# Patient Record
Sex: FEMALE | Race: WHITE | NOT HISPANIC OR LATINO | Employment: OTHER | URBAN - METROPOLITAN AREA
[De-identification: names, ages, dates, MRNs, and addresses within clinical notes are randomized per-mention and may not be internally consistent; named-entity substitution may affect disease eponyms.]

---

## 2017-01-05 ENCOUNTER — TELEPHONE (OUTPATIENT)
Dept: FAMILY MEDICINE | Facility: CLINIC | Age: 82
End: 2017-01-05

## 2017-01-05 DIAGNOSIS — Z12.31 OTHER SCREENING MAMMOGRAM: Primary | ICD-10-CM

## 2017-01-05 NOTE — TELEPHONE ENCOUNTER
----- Message from Rocio Quinones sent at 1/5/2017  2:39 PM CST -----  Contact: pt  Pt is requesting to have a mammogram done. Pt needs an order. Pls call pt back at [phone].

## 2017-01-08 RX ORDER — BENAZEPRIL HYDROCHLORIDE 5 MG/1
TABLET ORAL
Qty: 30 TABLET | Refills: 11 | Status: SHIPPED | OUTPATIENT
Start: 2017-01-08 | End: 2018-01-04

## 2017-01-08 RX ORDER — CLOPIDOGREL BISULFATE 75 MG/1
TABLET ORAL
Qty: 30 TABLET | Refills: 11 | Status: SHIPPED | OUTPATIENT
Start: 2017-01-08 | End: 2018-02-20 | Stop reason: SDUPTHER

## 2017-01-10 ENCOUNTER — HOSPITAL ENCOUNTER (OUTPATIENT)
Dept: RADIOLOGY | Facility: HOSPITAL | Age: 82
Discharge: HOME | End: 2017-01-10
Attending: FAMILY MEDICINE
Payer: MEDICARE

## 2017-01-10 DIAGNOSIS — Z12.31 OTHER SCREENING MAMMOGRAM: ICD-10-CM

## 2017-01-10 PROCEDURE — 77067 SCR MAMMO BI INCL CAD: CPT | Mod: 26,,, | Performed by: RADIOLOGY

## 2017-01-10 PROCEDURE — 77067 SCR MAMMO BI INCL CAD: CPT | Mod: TC

## 2017-01-10 PROCEDURE — 77063 BREAST TOMOSYNTHESIS BI: CPT | Mod: 26,,, | Performed by: RADIOLOGY

## 2017-02-08 ENCOUNTER — OFFICE VISIT (OUTPATIENT)
Dept: FAMILY MEDICINE | Facility: CLINIC | Age: 82
End: 2017-02-08
Payer: MEDICARE

## 2017-02-08 VITALS
WEIGHT: 127.56 LBS | SYSTOLIC BLOOD PRESSURE: 132 MMHG | HEART RATE: 75 BPM | BODY MASS INDEX: 21.78 KG/M2 | DIASTOLIC BLOOD PRESSURE: 60 MMHG | HEIGHT: 64 IN

## 2017-02-08 DIAGNOSIS — I25.10 CORONARY ARTERY DISEASE, ANGINA PRESENCE UNSPECIFIED, UNSPECIFIED VESSEL OR LESION TYPE, UNSPECIFIED WHETHER NATIVE OR TRANSPLANTED HEART: ICD-10-CM

## 2017-02-08 DIAGNOSIS — J30.9 ALLERGIC RHINITIS, UNSPECIFIED ALLERGIC RHINITIS TRIGGER, UNSPECIFIED RHINITIS SEASONALITY: ICD-10-CM

## 2017-02-08 DIAGNOSIS — S00.83XD FACIAL CONTUSION, SUBSEQUENT ENCOUNTER: Primary | ICD-10-CM

## 2017-02-08 DIAGNOSIS — J06.9 UPPER RESPIRATORY TRACT INFECTION, UNSPECIFIED TYPE: ICD-10-CM

## 2017-02-08 DIAGNOSIS — I10 ESSENTIAL HYPERTENSION: ICD-10-CM

## 2017-02-08 PROCEDURE — 99999 PR PBB SHADOW E&M-EST. PATIENT-LVL II: CPT | Mod: PBBFAC,,, | Performed by: FAMILY MEDICINE

## 2017-02-08 PROCEDURE — 1160F RVW MEDS BY RX/DR IN RCRD: CPT | Mod: S$GLB,,, | Performed by: FAMILY MEDICINE

## 2017-02-08 PROCEDURE — 99499 UNLISTED E&M SERVICE: CPT | Mod: S$PBB,,, | Performed by: FAMILY MEDICINE

## 2017-02-08 PROCEDURE — 99214 OFFICE O/P EST MOD 30 MIN: CPT | Mod: S$GLB,,, | Performed by: FAMILY MEDICINE

## 2017-02-08 PROCEDURE — 1157F ADVNC CARE PLAN IN RCRD: CPT | Mod: S$GLB,,, | Performed by: FAMILY MEDICINE

## 2017-02-08 PROCEDURE — 1159F MED LIST DOCD IN RCRD: CPT | Mod: S$GLB,,, | Performed by: FAMILY MEDICINE

## 2017-02-08 RX ORDER — MONTELUKAST SODIUM 10 MG/1
10 TABLET ORAL NIGHTLY
Qty: 30 TABLET | Refills: 11 | Status: SHIPPED | OUTPATIENT
Start: 2017-02-08 | End: 2017-03-10

## 2017-02-08 NOTE — PROGRESS NOTES
Tamie Fink presents with trip and fall 4 d ago to forehead,no loc,  ER eval CT orbits and frontal neg acute frx. Bruise extended but otherwise feels well. No loc. Has moderate upper respiratory congestion,rhinnorhea no cough past several months . OTC help slightly. Denies nausea,vomiting,diarrhea or significant fever.    Past Medical History   Diagnosis Date    Allergy     Arthritis     Asthma     GERD (gastroesophageal reflux disease)     HEARING LOSS     Hypertension     Lung disease     Pneumonia      Past Surgical History   Procedure Laterality Date    Carotid stent       Review of patient's allergies indicates:   Allergen Reactions    No known drug allergies      Current Outpatient Prescriptions on File Prior to Visit   Medication Sig Dispense Refill    atorvastatin (LIPITOR) 20 MG tablet Take 1 tablet (20 mg total) by mouth once daily. 30 tablet 11    benazepril (LOTENSIN) 5 MG tablet TAKE ONE TABLET BY MOUTH ONCE DAILY 30 tablet 11    BESIVANCE 0.6 % DrpS       clopidogrel (PLAVIX) 75 mg tablet TAKE ONE TABLET BY MOUTH ONCE DAILY 30 tablet 11    fluticasone (FLONASE) 50 mcg/actuation nasal spray Use 2 sprays to each nostril daily 16 g 12    fluticasone-salmeterol 250-50 mcg/dose (ADVAIR) 250-50 mcg/dose diskus inhaler Inhale 1 puff into the lungs 2 (two) times daily. 60 each 5    gabapentin (NEURONTIN) 300 MG capsule Take 3 capsules (900 mg total) by mouth every evening. 90 capsule 11    ipratropium (ATROVENT HFA) 17 mcg/actuation inhaler Inhale 2 puffs into the lungs every 6 (six) hours as needed for Wheezing. 12.9 g 3    metoprolol succinate (TOPROL-XL) 25 MG 24 hr tablet TAKE 1/2 TABLET BY MOUTH EVERY NIGHT AT BEDTIME 30 tablet 11    omeprazole (PRILOSEC) 20 MG capsule       tramadol (ULTRAM) 50 mg tablet TAKE 1 TABLET BY MOUTH EVERY 6 HOURS AS NEEDED 60 tablet 3    albuterol 90 mcg/actuation HFAA Inhale 2 puffs into the lungs every 6 (six) hours as needed. 18 g 11     [DISCONTINUED] meclizine (ANTIVERT) 25 mg tablet Take 0.5-1 tablets (12.5-25 mg total) by mouth 3 (three) times daily as needed. 45 tablet 0     Current Facility-Administered Medications on File Prior to Visit   Medication Dose Route Frequency Provider Last Rate Last Dose    hydrOXYzine injection 50 mg  50 mg Intramuscular 1 time in Clinic/HOD Amador Carlos MD         Social History     Social History    Marital status:      Spouse name: N/A    Number of children: N/A    Years of education: N/A     Occupational History    Not on file.     Social History Main Topics    Smoking status: Former Smoker     Packs/day: 1.00     Years: 40.00     Quit date: 5/3/1984    Smokeless tobacco: Never Used    Alcohol use No    Drug use: No    Sexual activity: Not on file     Other Topics Concern    Not on file     Social History Narrative     Family History   Problem Relation Age of Onset    Cancer Mother     Heart disease Father          ROS:  SKIN: No rashes, itching or changes in color or texture of skin.  EYES: Visual acuity fine. No photophobia, ocular pain or diplopia.EARS: Denies ear pain, discharge or vertigo.NOSE: No loss of smell, no epistaxis some postnasal drip.MOUTH & THROAT: No hoarseness or change in voice. No excessive gum bleeding.CHEST: Denies GERARDO, cyanosis, wheezing  CARDIOVASCULAR: Denies chest pain, PND, orthopnea or reduced exercise tolerance.  ABDOMEN:  No weight loss.No abdominal pain, no hematemesis or blood in stool.  URINARY: No flank pain, dysuria or hematuria.  PERIPHERAL VASCULAR: No claudication or cyanosis.  MUSCULOSKELETAL: Negative   NEUROLOGIC: No history of seizures, paralysis, alteration of gait or coordination.    PE: Vital signs as noted  Heent:Mod gen ecchymosis resolving hematoma face and scalp subcong hem as well  PERRLA,EOMI, ,fundi reveal no hemmorhage exudate or papilledema.Otic canals clear, tympanic membranes slightly dull bilaterally.Nasal mucosa slightly red and  edematous.Posterior pharynx slightly red but without exudate.  Neck:Supple with minimal anterior cervical adenopathy.  Chest:Clear bilateral breath sounds with mild scattered ronchi  Heart:Regular rhthym without murmer  Abdomen:Soft, non tender,no masses, no hepatosplenomegalyExtremeties and Neurologic:Grossly within normal limits  Impression: Facial contusions  CAD  COPD  AR  Plan: Obs   Resume plavix  Rev pulm toilet and Card fu

## 2017-02-27 ENCOUNTER — TELEPHONE (OUTPATIENT)
Dept: FAMILY MEDICINE | Facility: CLINIC | Age: 82
End: 2017-02-27

## 2017-02-27 NOTE — TELEPHONE ENCOUNTER
Pt states she is still having a HA above her eyes where she hit her head during her fall. She is going to come back in to see you but wanted to make sure theres no testing you wanted done.

## 2017-02-27 NOTE — TELEPHONE ENCOUNTER
----- Message from Monie Cary sent at 2/27/2017  1:44 PM CST -----  Contact: pt  Pt requests Faviola to call her regarding a follow up due to her fall. Pt can be reached at [phone] or [phone].

## 2017-02-28 NOTE — TELEPHONE ENCOUNTER
Pt notified states she is not having any tenderness and will continue to monitor and report any problems

## 2017-03-03 RX ORDER — GABAPENTIN 300 MG/1
CAPSULE ORAL
Qty: 90 CAPSULE | Refills: 11 | Status: SHIPPED | OUTPATIENT
Start: 2017-03-03 | End: 2018-03-08 | Stop reason: SDUPTHER

## 2017-03-07 ENCOUNTER — TELEPHONE (OUTPATIENT)
Dept: FAMILY MEDICINE | Facility: CLINIC | Age: 82
End: 2017-03-07

## 2017-03-07 NOTE — TELEPHONE ENCOUNTER
----- Message from Heide Almanzarite sent at 3/7/2017  1:33 PM CST -----  Contact: Pt   Pt called and stated she needed to speak to the nurse. She stated she needs clearance to see the dentist. She can be reached at [phone] or [phone].      Thanks,  Tf

## 2017-03-07 NOTE — TELEPHONE ENCOUNTER
Pt is needing to to have a tooth pulled and needs a letter stating it is ok to hold plavix and also needs to know how long to hold it for.     [facility] [phone] (phone)

## 2017-03-15 ENCOUNTER — TELEPHONE (OUTPATIENT)
Dept: FAMILY MEDICINE | Facility: CLINIC | Age: 82
End: 2017-03-15

## 2017-03-15 NOTE — TELEPHONE ENCOUNTER
----- Message from Zachary Lynn sent at 3/14/2017  4:10 PM CDT -----  Contact: pt   States she is calling to follow up on the letter being sent to her dentist office and wants to discuss and can be reached at [phone]//thanks/dbw

## 2017-04-06 ENCOUNTER — LAB VISIT (OUTPATIENT)
Dept: LAB | Facility: HOSPITAL | Age: 82
End: 2017-04-06
Attending: PSYCHIATRY & NEUROLOGY
Payer: MEDICARE

## 2017-04-06 DIAGNOSIS — G60.9 PERIPHERAL NEUROPATHY, IDIOPATHIC: ICD-10-CM

## 2017-04-06 DIAGNOSIS — R20.9 DISTURBANCE OF SKIN SENSATION: Primary | ICD-10-CM

## 2017-04-06 LAB
FOLATE SERPL-MCNC: 10.7 NG/ML
VIT B12 SERPL-MCNC: 667 PG/ML

## 2017-04-06 PROCEDURE — 82746 ASSAY OF FOLIC ACID SERUM: CPT

## 2017-04-06 PROCEDURE — 36415 COLL VENOUS BLD VENIPUNCTURE: CPT | Mod: PO

## 2017-04-06 PROCEDURE — 82607 VITAMIN B-12: CPT

## 2017-04-06 PROCEDURE — 84425 ASSAY OF VITAMIN B-1: CPT

## 2017-04-11 LAB — VIT B1 SERPL-MCNC: 52 UG/L (ref 38–122)

## 2017-06-08 ENCOUNTER — PATIENT OUTREACH (OUTPATIENT)
Dept: ADMINISTRATIVE | Facility: HOSPITAL | Age: 82
End: 2017-06-08

## 2017-06-08 NOTE — LETTER
June 8, 2017    Tamie Fink  [address] [place] [address]           [facility]  [address]  [facility] [address]  Phone: [phone] Dear Mrs. Fink:    [facility] is committed to your overall health.  To help you get the most out of each of your visits, we will review your information to make sure you are up to date on all of your recommended tests and/or procedures.      Amador Carlos MD has found that you may be due for   Health Maintenance Due   Topic    TETANUS VACCINE     DEXA SCAN     Zoster Vaccine     Pneumococcal (65+) (1 of 2 - PCV13)    Lipid Panel       If you have had any of the above done at another facility, please bring the records or information with you so that your record at [facility] will be complete.    If you are currently taking medication, please bring it with you to your appointment for review.    We will be happy to assist you with scheduling any necessary appointments or you may contact the [facility] appointment desk at [phone] to schedule at your convenience.     Thank you for choosing [facility] for your healthcare needs,      If you have any questions or concerns, please don't hesitate to call.    Sincerely,    ANITA Izaguirre  Care Coordination Department  [facility]  [phone]

## 2017-06-12 ENCOUNTER — TELEPHONE (OUTPATIENT)
Dept: FAMILY MEDICINE | Facility: CLINIC | Age: 82
End: 2017-06-12

## 2017-06-12 NOTE — TELEPHONE ENCOUNTER
----- Message from Elena Connors sent at 6/12/2017 10:01 AM CDT -----  Contact: pt  States she is scheduled on 6/22 for a pre op clearance and she needs an appt this week and she wants to see Dr Carlos. Please call pt at [phone]. Thank you

## 2017-06-13 ENCOUNTER — LAB VISIT (OUTPATIENT)
Dept: LAB | Facility: HOSPITAL | Age: 82
End: 2017-06-13
Attending: FAMILY MEDICINE
Payer: MEDICARE

## 2017-06-13 ENCOUNTER — OFFICE VISIT (OUTPATIENT)
Dept: FAMILY MEDICINE | Facility: CLINIC | Age: 82
End: 2017-06-13
Payer: MEDICARE

## 2017-06-13 VITALS
SYSTOLIC BLOOD PRESSURE: 116 MMHG | TEMPERATURE: 98 F | DIASTOLIC BLOOD PRESSURE: 54 MMHG | WEIGHT: 125.69 LBS | HEART RATE: 62 BPM | HEIGHT: 64 IN | BODY MASS INDEX: 21.46 KG/M2

## 2017-06-13 DIAGNOSIS — R42 VERTIGO: ICD-10-CM

## 2017-06-13 DIAGNOSIS — I25.10 CORONARY ARTERY DISEASE, ANGINA PRESENCE UNSPECIFIED, UNSPECIFIED VESSEL OR LESION TYPE, UNSPECIFIED WHETHER NATIVE OR TRANSPLANTED HEART: ICD-10-CM

## 2017-06-13 DIAGNOSIS — R42 VERTIGO: Primary | ICD-10-CM

## 2017-06-13 DIAGNOSIS — J06.9 UPPER RESPIRATORY TRACT INFECTION, UNSPECIFIED TYPE: ICD-10-CM

## 2017-06-13 DIAGNOSIS — I10 ESSENTIAL HYPERTENSION: ICD-10-CM

## 2017-06-13 LAB
ALBUMIN SERPL BCP-MCNC: 3.4 G/DL
ALP SERPL-CCNC: 83 U/L
ALT SERPL W/O P-5'-P-CCNC: 7 U/L
ANION GAP SERPL CALC-SCNC: 7 MMOL/L
AST SERPL-CCNC: 15 U/L
BASOPHILS # BLD AUTO: 0.04 K/UL
BASOPHILS NFR BLD: 0.5 %
BILIRUB SERPL-MCNC: 0.2 MG/DL
BUN SERPL-MCNC: 20 MG/DL
CALCIUM SERPL-MCNC: 9.4 MG/DL
CHLORIDE SERPL-SCNC: 102 MMOL/L
CO2 SERPL-SCNC: 31 MMOL/L
CREAT SERPL-MCNC: 0.9 MG/DL
DIFFERENTIAL METHOD: ABNORMAL
EOSINOPHIL # BLD AUTO: 0.1 K/UL
EOSINOPHIL NFR BLD: 1.6 %
ERYTHROCYTE [DISTWIDTH] IN BLOOD BY AUTOMATED COUNT: 14.2 %
EST. GFR  (AFRICAN AMERICAN): >60 ML/MIN/1.73 M^2
EST. GFR  (NON AFRICAN AMERICAN): 58.1 ML/MIN/1.73 M^2
GLUCOSE SERPL-MCNC: 90 MG/DL
HCT VFR BLD AUTO: 38.1 %
HGB BLD-MCNC: 11.6 G/DL
LYMPHOCYTES # BLD AUTO: 2.1 K/UL
LYMPHOCYTES NFR BLD: 25.4 %
MCH RBC QN AUTO: 29.6 PG
MCHC RBC AUTO-ENTMCNC: 30.4 %
MCV RBC AUTO: 97 FL
MONOCYTES # BLD AUTO: 0.6 K/UL
MONOCYTES NFR BLD: 7.5 %
NEUTROPHILS # BLD AUTO: 5.4 K/UL
NEUTROPHILS NFR BLD: 64.8 %
PLATELET # BLD AUTO: 239 K/UL
PMV BLD AUTO: 10.3 FL
POTASSIUM SERPL-SCNC: 4.6 MMOL/L
PROT SERPL-MCNC: 7.6 G/DL
RBC # BLD AUTO: 3.92 M/UL
SODIUM SERPL-SCNC: 140 MMOL/L
TSH SERPL DL<=0.005 MIU/L-ACNC: 1.24 UIU/ML
WBC # BLD AUTO: 8.3 K/UL

## 2017-06-13 PROCEDURE — 80053 COMPREHEN METABOLIC PANEL: CPT

## 2017-06-13 PROCEDURE — 84443 ASSAY THYROID STIM HORMONE: CPT

## 2017-06-13 PROCEDURE — 99999 PR PBB SHADOW E&M-EST. PATIENT-LVL II: CPT | Mod: PBBFAC,,, | Performed by: FAMILY MEDICINE

## 2017-06-13 PROCEDURE — 93010 ELECTROCARDIOGRAM REPORT: CPT | Mod: S$GLB,,, | Performed by: INTERNAL MEDICINE

## 2017-06-13 PROCEDURE — 85025 COMPLETE CBC W/AUTO DIFF WBC: CPT

## 2017-06-13 PROCEDURE — 93005 ELECTROCARDIOGRAM TRACING: CPT | Mod: S$GLB,,, | Performed by: FAMILY MEDICINE

## 2017-06-13 PROCEDURE — 1159F MED LIST DOCD IN RCRD: CPT | Mod: S$GLB,,, | Performed by: FAMILY MEDICINE

## 2017-06-13 PROCEDURE — 99214 OFFICE O/P EST MOD 30 MIN: CPT | Mod: S$GLB,,, | Performed by: FAMILY MEDICINE

## 2017-06-13 PROCEDURE — 36415 COLL VENOUS BLD VENIPUNCTURE: CPT | Mod: PO

## 2017-06-13 RX ORDER — MECLIZINE HCL 12.5 MG 12.5 MG/1
25 TABLET ORAL 3 TIMES DAILY PRN
Qty: 30 TABLET | Refills: 0 | Status: SHIPPED | OUTPATIENT
Start: 2017-06-13 | End: 2018-06-26

## 2017-06-13 NOTE — PROGRESS NOTES
[place] ADRIANNA Fink presents with sudden onset vertigo this am after 30 minutes dissipated       Past Medical History:   Diagnosis Date    Allergy     Arthritis     Asthma     GERD (gastroesophageal reflux disease)     HEARING LOSS     Hypertension     Lung disease     Pneumonia      Past Surgical History:   Procedure Laterality Date    CAROTID STENT       Allergies   Allergen Reactions    No Known Drug Allergies      Current Outpatient Prescriptions on File Prior to Visit   Medication Sig Dispense Refill    atorvastatin (LIPITOR) 20 MG tablet Take 1 tablet (20 mg total) by mouth once daily. 30 tablet 11    benazepril (LOTENSIN) 5 MG tablet TAKE ONE TABLET BY MOUTH ONCE DAILY 30 tablet 11    clopidogrel (PLAVIX) 75 mg tablet TAKE ONE TABLET BY MOUTH ONCE DAILY 30 tablet 11    fluticasone (FLONASE) 50 mcg/actuation nasal spray Use 2 sprays to each nostril daily 16 g 12    fluticasone-salmeterol 250-50 mcg/dose (ADVAIR) 250-50 mcg/dose diskus inhaler Inhale 1 puff into the lungs 2 (two) times daily. 60 each 5    gabapentin (NEURONTIN) 300 MG capsule TAKE THREE CAPSULES BY MOUTH IN THE EVENING 90 capsule 11    ipratropium (ATROVENT HFA) 17 mcg/actuation inhaler Inhale 2 puffs into the lungs every 6 (six) hours as needed for Wheezing. 12.9 g 3    omeprazole (PRILOSEC) 20 MG capsule       tramadol (ULTRAM) 50 mg tablet TAKE 1 TABLET BY MOUTH EVERY 6 HOURS AS NEEDED 60 tablet 3    albuterol 90 mcg/actuation HFAA Inhale 2 puffs into the lungs every 6 (six) hours as needed. 18 g 11    metoprolol succinate (TOPROL-XL) 25 MG 24 hr tablet TAKE 1/2 TABLET BY MOUTH EVERY NIGHT AT BEDTIME 30 tablet 11    [DISCONTINUED] BESIVANCE 0.6 % Ari        Current Facility-Administered Medications on File Prior to Visit   Medication Dose Route Frequency Provider Last Rate Last Dose    hydrOXYzine injection 50 mg  50 mg Intramuscular 1 time in Clinic/HOD Amador Carlos MD         Social History     Social History     Marital status:      Spouse name: N/A    Number of children: N/A    Years of education: N/A     Occupational History    Not on file.     Social History Main Topics    Smoking status: Former Smoker     Packs/day: 1.00     Years: 40.00     Quit date: 5/3/1984    Smokeless tobacco: Never Used    Alcohol use No    Drug use: No    Sexual activity: Not on file     Other Topics Concern    Not on file     Social History Narrative    No narrative on file     Family History   Problem Relation Age of Onset    Cancer Mother     Heart disease Father          ROS:  SKIN: No rashes, itching or changes in color or texture of skin.  EYES: Visual acuity fine. No photophobia, ocular pain or diplopia.EARS: Denies ear pain, discharge or vertigo.NOSE: No loss of smell, no epistaxis some postnasal drip.MOUTH & THROAT: No hoarseness or change in voice. No excessive gum bleeding.CHEST: Denies GERARDO, cyanosis, wheezing  CARDIOVASCULAR: Denies chest pain, PND, orthopnea or reduced exercise tolerance.  ABDOMEN:  No weight loss.No abdominal pain, no hematemesis or blood in stool.  URINARY: No flank pain, dysuria or hematuria.  PERIPHERAL VASCULAR: No claudication or cyanosis.  MUSCULOSKELETAL: Negative   NEUROLOGIC: No history of seizures, paralysis, alteration of gait or coordination.    PE: Vital signs as noted  Heent:Normocephalic with no recent cranial trauma,PERRLA,EOMI,conjunctiva clear,fundi reveal no hemmorhage exudate or papilledema.Otic canals clear, tympanic membranes slightly dull bilaterally.Nasal mucosa slightly red and edematous.Posterior pharynx slightly red but without exudate.  Neck:Supple with minimal anterior cervical adenopathy.  Chest:Clear bilateral breath sounds with mild scattered ronchi  Heart:Regular rhthym without murmer  Abdomen:Soft, non tender,no masses, no hepatosplenomegalyExtremeties and Neurologic:Grossly within normal limits  Impression: Vertigo  Hx CAD   HBP  Plan: Reduce Na    EKG   Lab  [place]   Pulse ox 95% RA   Medically cleared for Lt CTS surgery

## 2017-06-19 ENCOUNTER — TELEPHONE (OUTPATIENT)
Dept: FAMILY MEDICINE | Facility: CLINIC | Age: 82
End: 2017-06-19

## 2017-06-19 DIAGNOSIS — R42 VERTIGO: Primary | ICD-10-CM

## 2017-06-19 NOTE — TELEPHONE ENCOUNTER
----- Message from Oriana Beltran sent at 6/19/2017  9:30 AM CDT -----  Contact: Ousmane pascal son  Ousmane needs to speak to the nurse regarding a referral for an ENT/please call [phone] or [phone]/ma

## 2017-06-19 NOTE — TELEPHONE ENCOUNTER
Patient states that her dizziness has gotten bad and the meclizine made her sick. Updated med card. She would like a referral to ENT for vertigo. Is there someone you would recommend for her?

## 2017-06-20 ENCOUNTER — OFFICE VISIT (OUTPATIENT)
Dept: OTOLARYNGOLOGY | Facility: CLINIC | Age: 82
End: 2017-06-20
Payer: MEDICARE

## 2017-06-20 ENCOUNTER — CLINICAL SUPPORT (OUTPATIENT)
Dept: AUDIOLOGY | Facility: CLINIC | Age: 82
End: 2017-06-20
Payer: MEDICARE

## 2017-06-20 VITALS
SYSTOLIC BLOOD PRESSURE: 152 MMHG | WEIGHT: 125.88 LBS | HEART RATE: 70 BPM | DIASTOLIC BLOOD PRESSURE: 78 MMHG | BODY MASS INDEX: 21.61 KG/M2

## 2017-06-20 DIAGNOSIS — H90.3 SENSORINEURAL HEARING LOSS (SNHL) OF BOTH EARS: ICD-10-CM

## 2017-06-20 DIAGNOSIS — H90.3 SENSORINEURAL HEARING LOSS, BILATERAL: Primary | ICD-10-CM

## 2017-06-20 DIAGNOSIS — H69.93 ETD (EUSTACHIAN TUBE DYSFUNCTION), BILATERAL: ICD-10-CM

## 2017-06-20 DIAGNOSIS — R42 DIZZINESS: Primary | ICD-10-CM

## 2017-06-20 DIAGNOSIS — H81.90 VESTIBULOPATHY, UNSPECIFIED LATERALITY: ICD-10-CM

## 2017-06-20 PROCEDURE — 1126F AMNT PAIN NOTED NONE PRSNT: CPT | Mod: S$GLB,,, | Performed by: PHYSICIAN ASSISTANT

## 2017-06-20 PROCEDURE — 92540 BASIC VESTIBULAR EVALUATION: CPT | Mod: S$GLB,,, | Performed by: AUDIOLOGIST-HEARING AID FITTER

## 2017-06-20 PROCEDURE — 92557 COMPREHENSIVE HEARING TEST: CPT | Mod: S$GLB,,, | Performed by: AUDIOLOGIST-HEARING AID FITTER

## 2017-06-20 PROCEDURE — 99204 OFFICE O/P NEW MOD 45 MIN: CPT | Mod: S$GLB,,, | Performed by: PHYSICIAN ASSISTANT

## 2017-06-20 PROCEDURE — 92567 TYMPANOMETRY: CPT | Mod: S$GLB,,, | Performed by: AUDIOLOGIST-HEARING AID FITTER

## 2017-06-20 PROCEDURE — 99999 PR PBB SHADOW E&M-EST. PATIENT-LVL I: CPT | Mod: PBBFAC,,, | Performed by: AUDIOLOGIST-HEARING AID FITTER

## 2017-06-20 PROCEDURE — 1159F MED LIST DOCD IN RCRD: CPT | Mod: S$GLB,,, | Performed by: PHYSICIAN ASSISTANT

## 2017-06-20 PROCEDURE — 99999 PR PBB SHADOW E&M-EST. PATIENT-LVL III: CPT | Mod: PBBFAC,,, | Performed by: PHYSICIAN ASSISTANT

## 2017-06-20 NOTE — PROGRESS NOTES
Subjective:       Patient ID: Tamie Fink is a 86 y.o. female.    Chief Complaint: Dizziness (review audio today )    Patient is a very pleasant 86 year old female here to see me today for the first time in consultation at the request of Dr. Carlos for evaluation of dizziness and imbalance.  She had sudden onset vertigo about one week ago while in bed.  Ever since that time, she feels dizzy; like her head is swimming and she has imbalance while walking.  She has know hearing loss and wears hearing aids in each ear from [facility].  She reports equal sided hearing loss that is unchanged with the dizziness.  She denies tinnitus.  Denies ear pain or drainage.  She has tried Antivert for her dizziness but stopped it due to GI upset.  She has allergy problems and is currently congested and she always has rhinorrhea.  She uses Flonase as needed (not consistently).  She did trip over a dog bed and fall in February of this year and struck her head.      Review of Systems   Constitutional: Negative for activity change, appetite change and fever.   HENT: Positive for congestion, hearing loss and rhinorrhea. Negative for ear discharge, ear pain, nosebleeds, postnasal drip, sinus pressure, sneezing, sore throat, tinnitus and trouble swallowing.    Eyes: Negative for discharge.   Respiratory: Positive for cough. Negative for shortness of breath and wheezing.    Cardiovascular: Negative for chest pain.   Gastrointestinal: Negative for diarrhea, nausea and vomiting.   Musculoskeletal: Positive for arthralgias. Negative for neck pain.   Allergic/Immunologic: Positive for environmental allergies. Negative for food allergies.   Neurological: Positive for dizziness and headaches. Negative for light-headedness.   Hematological: Negative for adenopathy.   Psychiatric/Behavioral: Negative for sleep disturbance.       Objective:      Physical Exam   Constitutional: She is oriented to person, place, and time. She appears well-developed and  well-nourished. She is cooperative. No distress.   HENT:   Head: Normocephalic and atraumatic.   Right Ear: External ear and ear canal normal. Tympanic membrane is retracted.   Left Ear: External ear and ear canal normal. Tympanic membrane is retracted.   Nose: Nose normal. No mucosal edema, rhinorrhea, nasal deformity or septal deviation. No epistaxis. Right sinus exhibits no maxillary sinus tenderness and no frontal sinus tenderness. Left sinus exhibits no maxillary sinus tenderness and no frontal sinus tenderness.   Mouth/Throat: Uvula is midline, oropharynx is clear and moist and mucous membranes are normal. Mucous membranes are not pale and not dry. No trismus in the jaw. Normal dentition. No uvula swelling. No oropharyngeal exudate or posterior oropharyngeal erythema.   Eyes: Conjunctivae, EOM and lids are normal. Pupils are equal, round, and reactive to light. Right eye exhibits no chemosis. Left eye exhibits no chemosis. Right conjunctiva is not injected. Left conjunctiva is not injected. No scleral icterus. Right eye exhibits normal extraocular motion and no nystagmus. Left eye exhibits normal extraocular motion and no nystagmus.   Neck: Trachea normal and phonation normal. No tracheal tenderness present. No tracheal deviation present. No thyroid mass and no thyromegaly present.   Cardiovascular: Intact distal pulses.    Pulmonary/Chest: Effort normal. No stridor. No respiratory distress.   Abdominal: She exhibits no distension.   Lymphadenopathy:        Head (right side): No submental, no submandibular, no preauricular and no posterior auricular adenopathy present.        Head (left side): No submental, no submandibular, no preauricular and no posterior auricular adenopathy present.     She has no cervical adenopathy.   Neurological: She is alert and oriented to person, place, and time. No cranial nerve deficit.   Skin: Skin is warm and dry. No rash noted. No erythema.   Psychiatric: She has a normal mood  and affect. Her behavior is normal.       [place]-Hallpike:  Positive for dizziness but negative nystagmus on RIGHT      AUDIOGRAM:  Results reveal a mild-to-moderate sensorineural hearing loss 250-8000 Hz bilaterally.  Speech Reception Thresholds were  35 dBHL for the right ear and 40 dBHL for the left ear.   Word recognition scores were fair for the right ear and fair for the left ear.   Tympanograms were Type A for the right ear and Type A for the left ear.    Videonystagmography Report (VNG):  Oculomotor function tests (sinusoidal tracking, saccade, optokinetic) were normal and symmetric.  Spontaneous test was absent for nystagmus.  Gaze test was absent for nystagmus.  Head-shake test was absent for after head-shake nystagmus.  Static Positional test revealed right-beating nystagmus to head center and head left:                        Head Center: 4 d/s RB                        Head Right: Absent for nystagmus                        Head Left: 5 d/s RB  [place]-Hallpike Right revealed 7 d/s up-beating + possible 2 d/s right-beating nystagmus that suppressed with fixation; no torsional component observed.  [place]-Hallpike Right Repeat revealed 7 d/s up-beating + 4 d/s left-beating nystagmus; no torsional component; did not fatigue and response suppressed with fixation.  [place]-Hallpike Left revealed a few beats of 5 d/s right-beating nystagmus.     Summary: Abnormal non-localizing VNG screen.  Today's abnormal findings were direction-changing positional nystagmus that is not consistent with BPPV.       Assessment:       1. Dizziness    2. ETD (eustachian tube dysfunction), bilateral    3. Sensorineural hearing loss (SNHL) of both ears        Plan:         1.  Dizziness:  Reviewed results of today audiogram and VNG with patient and her son.  Recommend she return for calorics.  Will call her with results.  Offered to perform Epley today as she does have strong response with dizziness only with [place]-Hallpike on the right but she  requests to wait and do at time of calorics. She reports having a sister who  with brain tumor and she's worried her symptoms may be related.  She reports having a negative CT Head at [facility] after her fall in 2017.  2.  ETD:  Reviewed results of her tympanograms and discussed that she has negative pressure bilaterally.  Recommend Flonase daily.   We discussed in detail the proper mechanism of use directing the spray away from the nasal septum.  In addition, we also discussed that it will take two to three weeks of daily use to achieve maximal effectiveness.    3.. SNHL:  Continue with hearing aids.  Patient was given a copy of today's audiogram to share with her hearing aid dispenser.  Recommend hearing protection around loud noises and annual audiograms.    Thanks Dr. Carlos for the referral.  Report returned via EPIC.

## 2017-06-20 NOTE — PROGRESS NOTES
Referring provider: Morena Bradshaw PA-C    Tamie Fink was seen 06/20/2017 for an audiological and vestibular evaluation.  Patient complains of dizziness and imbalance.  She had sudden onset vertigo about one week ago.  Ever since she feels dizzy, head swimming and imbalance.  She has hearing loss and wears hearing aids in each ear from [employer].  She reports equal sided hearing loss that is unchanged with the dizziness.  She denies tinnitus.  She has allergy problems and is currently congested.     Audiology Report:  Results reveal a mild-to-moderate sensorineural hearing loss 250-8000 Hz bilaterally.  Speech Reception Thresholds were  35 dBHL for the right ear and 40 dBHL for the left ear.   Word recognition scores were fair for the right ear and fair for the left ear.   Tympanograms were Type A for the right ear and Type A for the left ear.      Videonystagmography Report (VNG):  Oculomotor function tests (sinusoidal tracking, saccade, optokinetic) were normal and symmetric.  Spontaneous test was absent for nystagmus.  Gaze test was absent for nystagmus.  Head-shake test was absent for after head-shake nystagmus.  Static Positional test revealed right-beating nystagmus to head center and head left:   Head Center: 4 d/s RB   Head Right: Absent for nystagmus   Head Left: 5 d/s RB  [place]-Hallpike Right revealed 7 d/s up-beating + possible 2 d/s right-beating nystagmus that suppressed with fixation; no torsional component observed.  [place]ber[place]-Hallpike Right Repeat revealed 7 d/s up-beating + 4 d/s left-beating nystagmus; no torsional component; did not fatigue and response suppressed with fixation.  [place]-Hallpike Left revealed a few beats of 5 d/s right-beating nystagmus.    Summary: Abnormal non-localizing VNG screen.  Today's abnormal findings were direction-changing positional nystagmus that is not consistent with BPPV.    Recommendations:  1. ENT  2. Calorics  3. Continue with hearing aids.  Patient was given a copy  of today's audiogram to share with her hearing aid dispenser.  4. Annual audiograms    Patient was counseled on the above findings.  Tracings are to be scanned.

## 2017-06-26 ENCOUNTER — CLINICAL SUPPORT (OUTPATIENT)
Dept: AUDIOLOGY | Facility: CLINIC | Age: 82
End: 2017-06-26
Payer: MEDICARE

## 2017-06-26 DIAGNOSIS — H81.8X9 OTHER DISORDERS OF VESTIBULAR FUNCTION, UNSPECIFIED EAR: ICD-10-CM

## 2017-06-26 DIAGNOSIS — H69.93 EUSTACHIAN TUBE DYSFUNCTION, BILATERAL: Primary | ICD-10-CM

## 2017-06-26 PROCEDURE — 92537 CALORIC VSTBLR TEST W/REC: CPT | Mod: S$GLB,,, | Performed by: AUDIOLOGIST-HEARING AID FITTER

## 2017-06-26 PROCEDURE — 92542 POSITIONAL NYSTAGMUS TEST: CPT | Mod: S$GLB,,, | Performed by: AUDIOLOGIST-HEARING AID FITTER

## 2017-06-26 NOTE — PROGRESS NOTES
Referring provider: Morena Bradshaw PA-C    Tamie Fink was seen 2017 for completion Videonystagmography (VNG) calorics testing.    VNG results from 2017 revealed non-localizing positional nystagmus.  Patient reports dizziness and imbalance is improving since her last visit.  She had sudden onset vertigo about 1.5 weeks ago while in bed. She was feeling constant dizziness; like her head is swimming and imbalance.  She has know hearing loss and wears hearing aids in each ear from [employer].     Pre-Screen:  [place]-Hallpike: Negative for BPPV to the right and to the left    VNG Re-check:  Spontaneous nystagmus was absent.  Static Positional nystagmus was absent.  Hallpike Head-hanging Right: Absent for nystagmus or dizziness  Hallpike Head-hanging Left: Absent for nystagmus or dizziness  Bi-thermal caloric irrigations revealed an 11% caloric weakness in the right ear, which is within normal limits, and 2% directional preponderance to the left, which is within normal limits.  Fixation suppression following caloric irrigations were normal.  RC: 11 d/s   RW: 10 d/s    d/s   LW: 13 d/s    Impressions: Normal VNG; no evidence inner ear dysfunction nor asymmetry including positional nystagmus or BPPV.     Recommendations:  1. ENT review.  Continue with Flonase per ENT - patient reports it is helping.   2. A trial with VOR exercises to encourage adaptation and reduce subjective symptoms    Tracings are scanned into computer.

## 2017-06-26 NOTE — Clinical Note
VNG review only - normal results - patient feeling better - flonase helping.  Please contact patient if additional recommendations.

## 2017-06-27 RX ORDER — FLUTICASONE PROPIONATE 50 MCG
SPRAY, SUSPENSION (ML) NASAL
Qty: 16 G | Refills: 12 | Status: SHIPPED | OUTPATIENT
Start: 2017-06-27 | End: 2018-06-26

## 2017-06-28 ENCOUNTER — TELEPHONE (OUTPATIENT)
Dept: FAMILY MEDICINE | Facility: CLINIC | Age: 82
End: 2017-06-28

## 2017-06-28 ENCOUNTER — HOSPITAL ENCOUNTER (OUTPATIENT)
Dept: RADIOLOGY | Facility: HOSPITAL | Age: 82
Discharge: HOME | End: 2017-06-28
Attending: FAMILY MEDICINE
Payer: MEDICARE

## 2017-06-28 DIAGNOSIS — Z01.818 PRE-OPERATIVE CLEARANCE: ICD-10-CM

## 2017-06-28 DIAGNOSIS — Z01.818 PRE-OPERATIVE CLEARANCE: Primary | ICD-10-CM

## 2017-06-28 PROCEDURE — 71020 XR CHEST PA AND LATERAL: CPT | Mod: 26,,, | Performed by: RADIOLOGY

## 2017-06-28 PROCEDURE — 71020 XR CHEST PA AND LATERAL: CPT | Mod: TC,PO

## 2017-06-28 RX ORDER — CIPROFLOXACIN 500 MG/1
500 TABLET ORAL 2 TIMES DAILY
Qty: 6 TABLET | Refills: 0 | Status: SHIPPED | OUTPATIENT
Start: 2017-06-28 | End: 2017-07-01

## 2017-06-28 NOTE — TELEPHONE ENCOUNTER
----- Message from Domenica Soares sent at 6/28/2017  9:18 AM CDT -----  Contact: Dr Ortiz Office/Doreen  Please call nurse @[phone] regarding surgery clearance, pt is schedule for surgery on Monday.

## 2017-06-29 ENCOUNTER — LAB VISIT (OUTPATIENT)
Dept: LAB | Facility: HOSPITAL | Age: 82
End: 2017-06-29
Attending: FAMILY MEDICINE
Payer: MEDICARE

## 2017-06-29 ENCOUNTER — TELEPHONE (OUTPATIENT)
Dept: FAMILY MEDICINE | Facility: CLINIC | Age: 82
End: 2017-06-29

## 2017-06-29 DIAGNOSIS — Z01.818 PRE-OPERATIVE CLEARANCE: ICD-10-CM

## 2017-06-29 DIAGNOSIS — Z01.818 PRE-OPERATIVE CLEARANCE: Primary | ICD-10-CM

## 2017-06-29 LAB
APTT BLDCRRT: 25.3 SEC
INR PPP: 1
PROTHROMBIN TIME: 10.6 SEC

## 2017-06-29 PROCEDURE — 36415 COLL VENOUS BLD VENIPUNCTURE: CPT | Mod: PO

## 2017-06-29 PROCEDURE — 85730 THROMBOPLASTIN TIME PARTIAL: CPT

## 2017-06-29 PROCEDURE — 85610 PROTHROMBIN TIME: CPT

## 2017-06-29 NOTE — TELEPHONE ENCOUNTER
----- Message from Winnie Gilmore sent at 6/29/2017  1:32 PM CDT -----  Contact: Doreen Ortiz's office  She needs a copy of UA, PTT PT&INR and chest xray.  Fax to [phone] and phone [phone].                                             oliva

## 2017-06-29 NOTE — TELEPHONE ENCOUNTER
LM on recorder adv UA and chest xray will be faxed but unaware of needing a PTT PT INR and to return phone call

## 2017-06-30 ENCOUNTER — TELEPHONE (OUTPATIENT)
Dept: FAMILY MEDICINE | Facility: CLINIC | Age: 82
End: 2017-06-30

## 2017-06-30 NOTE — TELEPHONE ENCOUNTER
----- Message from Hosea Aguilar sent at 6/30/2017  9:02 AM CDT -----  Contact: Ryrwigkafhko-[phone]  Would like consult with Geovanna about pre-op for pt. Please fax over pt lab report to fax # [phone] or call Doreen at [phone]. LJ

## 2017-07-01 RX ORDER — TRAMADOL HYDROCHLORIDE 50 MG/1
TABLET ORAL
Qty: 60 TABLET | Refills: 3 | Status: SHIPPED | OUTPATIENT
Start: 2017-07-01 | End: 2017-09-28 | Stop reason: SDUPTHER

## 2017-07-12 ENCOUNTER — TELEPHONE (OUTPATIENT)
Dept: FAMILY MEDICINE | Facility: CLINIC | Age: 82
End: 2017-07-12

## 2017-07-12 DIAGNOSIS — Z01.818 PRE-OP EVALUATION: Primary | ICD-10-CM

## 2017-07-12 NOTE — TELEPHONE ENCOUNTER
----- Message from Evelyne Guzman sent at 7/12/2017 10:15 AM CDT -----  Contact: Doreen ( Dr Ortiz ( [facility])   Doreen ( Dr Ortiz ( [facility]) is requesting a call from nurse in regards to a surgery.          Please call pt back at [phone]

## 2017-08-23 RX ORDER — ATORVASTATIN CALCIUM 20 MG/1
TABLET, FILM COATED ORAL
Qty: 30 TABLET | Refills: 11 | Status: SHIPPED | OUTPATIENT
Start: 2017-08-23 | End: 2018-07-20 | Stop reason: SDUPTHER

## 2017-08-25 RX ORDER — OMEPRAZOLE 20 MG/1
20 CAPSULE, DELAYED RELEASE ORAL NIGHTLY
Qty: 90 CAPSULE | Refills: 3 | Status: SHIPPED | OUTPATIENT
Start: 2017-08-25 | End: 2018-06-22 | Stop reason: SDUPTHER

## 2017-08-25 NOTE — TELEPHONE ENCOUNTER
----- Message from Ike Silva sent at 8/25/2017  1:16 PM CDT -----  Contact: Ken Sanchez  She's calling in regards to receipt of a fax for a RX authorization for: Prilosec 20 mg, please fax this back to [phone], please advise, ph# [phone]

## 2017-08-25 NOTE — TELEPHONE ENCOUNTER
Patient had prilosec 20 mg at bedtime prescribed by her pulmonologist but no longer sees him. Would like you to prescribe medication for her.

## 2017-09-28 RX ORDER — TRAMADOL HYDROCHLORIDE 50 MG/1
TABLET ORAL
Qty: 60 TABLET | Refills: 1 | Status: SHIPPED | OUTPATIENT
Start: 2017-09-28 | End: 2017-12-30 | Stop reason: SDUPTHER

## 2017-12-12 ENCOUNTER — TELEPHONE (OUTPATIENT)
Dept: FAMILY MEDICINE | Facility: CLINIC | Age: 82
End: 2017-12-12

## 2017-12-12 ENCOUNTER — OFFICE VISIT (OUTPATIENT)
Dept: FAMILY MEDICINE | Facility: CLINIC | Age: 82
End: 2017-12-12
Payer: MEDICARE

## 2017-12-12 VITALS
WEIGHT: 120.13 LBS | SYSTOLIC BLOOD PRESSURE: 128 MMHG | HEIGHT: 64 IN | TEMPERATURE: 98 F | DIASTOLIC BLOOD PRESSURE: 56 MMHG | BODY MASS INDEX: 20.51 KG/M2 | HEART RATE: 71 BPM

## 2017-12-12 DIAGNOSIS — R53.83 FATIGUE, UNSPECIFIED TYPE: Primary | ICD-10-CM

## 2017-12-12 DIAGNOSIS — D64.9 ANEMIA, UNSPECIFIED TYPE: ICD-10-CM

## 2017-12-12 DIAGNOSIS — E03.9 HYPOTHYROIDISM, UNSPECIFIED TYPE: ICD-10-CM

## 2017-12-12 DIAGNOSIS — J06.9 UPPER RESPIRATORY TRACT INFECTION, UNSPECIFIED TYPE: Primary | ICD-10-CM

## 2017-12-12 PROCEDURE — 99999 PR PBB SHADOW E&M-EST. PATIENT-LVL III: CPT | Mod: PBBFAC,,, | Performed by: FAMILY MEDICINE

## 2017-12-12 PROCEDURE — 96372 THER/PROPH/DIAG INJ SC/IM: CPT | Mod: S$GLB,,, | Performed by: FAMILY MEDICINE

## 2017-12-12 PROCEDURE — 99213 OFFICE O/P EST LOW 20 MIN: CPT | Mod: 25,S$GLB,, | Performed by: FAMILY MEDICINE

## 2017-12-12 RX ORDER — DEXAMETHASONE SODIUM PHOSPHATE 4 MG/ML
8 INJECTION, SOLUTION INTRA-ARTICULAR; INTRALESIONAL; INTRAMUSCULAR; INTRAVENOUS; SOFT TISSUE ONCE
Status: COMPLETED | OUTPATIENT
Start: 2017-12-12 | End: 2017-12-12

## 2017-12-12 RX ADMIN — DEXAMETHASONE SODIUM PHOSPHATE 8 MG: 4 INJECTION, SOLUTION INTRA-ARTICULAR; INTRALESIONAL; INTRAMUSCULAR; INTRAVENOUS; SOFT TISSUE at 09:12

## 2017-12-12 NOTE — TELEPHONE ENCOUNTER
Patient was seen in the clinic this morning. She states she forgot to discuss her issues with fatigue, and she lost approximately 7 pounds in six weeks. Please advise.

## 2017-12-12 NOTE — PROGRESS NOTES
Tamie Fink presents with moderate upper respiratory congestion,rhinnorhea,moderate cough past 2-3 days. OTC help slightly. Denies nausea,vomiting,diarrhea or significant fever.    Past Medical History:   Diagnosis Date    Allergy     Arthritis     Asthma     GERD (gastroesophageal reflux disease)     HEARING LOSS     Hypertension     Lung disease     Pneumonia      Past Surgical History:   Procedure Laterality Date    CAROTID STENT       Review of patient's allergies indicates:  No Known Allergies  Current Outpatient Prescriptions on File Prior to Visit   Medication Sig Dispense Refill    atorvastatin (LIPITOR) 20 MG tablet Take 1 tablet (20 mg total) by mouth once daily. 30 tablet 11    benazepril (LOTENSIN) 5 MG tablet TAKE ONE TABLET BY MOUTH ONCE DAILY 30 tablet 11    clopidogrel (PLAVIX) 75 mg tablet TAKE ONE TABLET BY MOUTH ONCE DAILY 30 tablet 11    fluticasone (FLONASE) 50 mcg/actuation nasal spray USE TWO SPRAYS IN EACH NOSTRIL EVERY DAY 16 g 12    fluticasone-salmeterol 250-50 mcg/dose (ADVAIR) 250-50 mcg/dose diskus inhaler Inhale 1 puff into the lungs 2 (two) times daily. 60 each 5    gabapentin (NEURONTIN) 300 MG capsule TAKE THREE CAPSULES BY MOUTH IN THE EVENING 90 capsule 11    ipratropium (ATROVENT HFA) 17 mcg/actuation inhaler Inhale 2 puffs into the lungs every 6 (six) hours as needed for Wheezing. 12.9 g 11    meclizine (ANTIVERT) 12.5 mg tablet Take 2 tablets (25 mg total) by mouth 3 (three) times daily as needed. 30 tablet 0    omeprazole (PRILOSEC) 20 MG capsule Take 1 capsule (20 mg total) by mouth every evening. 90 capsule 3    tramadol (ULTRAM) 50 mg tablet TAKE 1 TABLET BY MOUTH EVERY 6 HOURS AS NEEDED 60 tablet 1     Current Facility-Administered Medications on File Prior to Visit   Medication Dose Route Frequency Provider Last Rate Last Dose    hydrOXYzine injection 50 mg  50 mg Intramuscular 1 time in Clinic/HOD Amador Carlos MD         Social History      Social History    Marital status:      Spouse name: N/A    Number of children: N/A    Years of education: N/A     Occupational History    Not on file.     Social History Main Topics    Smoking status: Former Smoker     Packs/day: 1.00     Years: 40.00     Quit date: 5/3/1984    Smokeless tobacco: Never Used    Alcohol use No    Drug use: No    Sexual activity: Not on file     Other Topics Concern    Not on file     Social History Narrative    No narrative on file     Family History   Problem Relation Age of Onset    Cancer Mother     Heart disease Father          ROS:  SKIN: No rashes, itching or changes in color or texture of skin.  EYES: Visual acuity fine. No photophobia, ocular pain or diplopia.EARS: Denies ear pain, discharge or vertigo.NOSE: No loss of smell, no epistaxis some postnasal drip.MOUTH & THROAT: No hoarseness or change in voice. No excessive gum bleeding.CHEST: Denies GERARDO, cyanosis, wheezing  CARDIOVASCULAR: Denies chest pain, PND, orthopnea or reduced exercise tolerance.  ABDOMEN:  No weight loss.No abdominal pain, no hematemesis or blood in stool.  URINARY: No flank pain, dysuria or hematuria.  PERIPHERAL VASCULAR: No claudication or cyanosis.  MUSCULOSKELETAL: Negative   NEUROLOGIC: No history of seizures, paralysis, alteration of gait or coordination.    PE: Vital signs as noted  Heent:Normocephalic with no recent cranial trauma,PERRLA,EOMI,conjunctiva clear,fundi reveal no hemmorhage exudate or papilledema.Otic canals clear, tympanic membranes slightly dull bilaterally.Nasal mucosa slightly red and edematous.Posterior pharynx slightly red but without exudate.  Neck:Supple with minimal anterior cervical adenopathy.  Chest:Clear bilateral breath sounds with mild scattered ronchi  Heart:Regular rhthym without murmer  Abdomen:Soft, non tender,no masses, no hepatosplenomegalyExtremeties and Neurologic:Grossly within normal limits  Impression: Upper Respiratory Infection.  465.9  Plan:   Deca 8im and call report

## 2017-12-28 ENCOUNTER — PATIENT OUTREACH (OUTPATIENT)
Dept: ADMINISTRATIVE | Facility: CLINIC | Age: 82
End: 2017-12-28

## 2017-12-28 NOTE — PATIENT INSTRUCTIONS
Fall Prevention  Falls often occur due to slipping, tripping or losing your balance. Millions of people fall every year and injure themselves. Here are ways to reduce your risk of falling again.  · Think about your fall, was there anything that caused your fall that can be fixed, removed, or replaced?  · Make your home safe by keeping walkways clear of objects you may trip over.  · Use non-slip pads under rugs. Do not use area rugs or small throw rugs.  · Use non-slip mats in bathtubs and showers.  · Install handrails and lights on staircases.  · Do not walk in poorly lit areas.  · Do not stand on chairs or wobbly ladders.  · Use caution when reaching overhead or looking upward. This position can cause a loss of balance.  · Be sure your shoes fit properly, have non-slip bottoms and are in good condition.   · Wear shoes both inside and out. Avoid going barefoot or wearing slippers.  · Be cautious when going up and down stairs, curbs, and when walking on uneven sidewalks.  · If your balance is poor, consider using a cane or walker.  · If your fall was related to alcohol use, stop or limit alcohol intake.   · If your fall was related to use of sleeping medicines, talk to your doctor about this. You may need to reduce your dosage at bedtime if you awaken during the night to go to the bathroom.    · To reduce the need for nighttime bathroom trips:  ¨ Avoid drinking fluids for several hours before going to bed  ¨ Empty your bladder before going to bed  ¨ Men can keep a urinal at the bedside  · Stay as active as you can. Balance, flexibility, strength, and endurance all come from exercise. They all play a role in preventing falls. Ask your healthcare provider which types of activity are right for you.  · Get your vision checked on a regular basis.  · If you have pets, know where they are before you stand up or walk so you don't trip over them.  · Use night lights.  Date Last Reviewed: 11/5/2015  © 4911-7931 [employer], [employer]. [address] [place] [address], [place] [address]. All rights reserved. This information is not intended as a substitute for professional medical care. Always follow your healthcare professional's instructions.

## 2017-12-29 ENCOUNTER — TELEPHONE (OUTPATIENT)
Dept: FAMILY MEDICINE | Facility: CLINIC | Age: 82
End: 2017-12-29

## 2017-12-29 NOTE — TELEPHONE ENCOUNTER
----- Message from Carolina Bradshaw sent at 12/29/2017  9:51 AM CST -----  Contact: pt  The pt wants to know if she can double up on her b/p medication, pt can be reached at [phone]///thxMW

## 2017-12-29 NOTE — TELEPHONE ENCOUNTER
Pt states since this morning her b/p has been 166/92 and 157/84 pt states that it has been like this since she was d/c from the hospital. She is wondering can she increase her dosage of benazepril 5 mg. Please advise.

## 2017-12-29 NOTE — TELEPHONE ENCOUNTER
Yes try taking 2x 5mg for 3-4 days and if johana well let us know and we will change rx to the 10 mg

## 2017-12-31 RX ORDER — TRAMADOL HYDROCHLORIDE 50 MG/1
TABLET ORAL
Qty: 60 TABLET | Refills: 1 | Status: SHIPPED | OUTPATIENT
Start: 2017-12-31 | End: 2018-02-20 | Stop reason: SDUPTHER

## 2018-01-02 ENCOUNTER — TELEPHONE (OUTPATIENT)
Dept: FAMILY MEDICINE | Facility: CLINIC | Age: 83
End: 2018-01-02

## 2018-01-02 NOTE — TELEPHONE ENCOUNTER
Patient has hosp f/u on 1/9/18, her BP has been running 160-170's/80-90's, after doubling her BP med, do you want to see her this week? Or is her appt next week ok and cont doubling med?

## 2018-01-02 NOTE — TELEPHONE ENCOUNTER
----- Message from Danyelle Gamboa sent at 1/2/2018  9:28 AM CST -----  Contact: pt   Pt would like to discuss her blood pressure,,,Please call pt back at [phone]

## 2018-01-04 ENCOUNTER — OFFICE VISIT (OUTPATIENT)
Dept: FAMILY MEDICINE | Facility: CLINIC | Age: 83
End: 2018-01-04
Payer: MEDICARE

## 2018-01-04 VITALS
HEART RATE: 66 BPM | WEIGHT: 119.94 LBS | HEIGHT: 64 IN | BODY MASS INDEX: 20.47 KG/M2 | TEMPERATURE: 98 F | DIASTOLIC BLOOD PRESSURE: 70 MMHG | SYSTOLIC BLOOD PRESSURE: 135 MMHG

## 2018-01-04 DIAGNOSIS — I25.10 CORONARY ARTERY DISEASE, ANGINA PRESENCE UNSPECIFIED, UNSPECIFIED VESSEL OR LESION TYPE, UNSPECIFIED WHETHER NATIVE OR TRANSPLANTED HEART: Primary | ICD-10-CM

## 2018-01-04 DIAGNOSIS — I10 ESSENTIAL HYPERTENSION: ICD-10-CM

## 2018-01-04 DIAGNOSIS — R55 SYNCOPE, UNSPECIFIED SYNCOPE TYPE: ICD-10-CM

## 2018-01-04 PROCEDURE — 99999 PR PBB SHADOW E&M-EST. PATIENT-LVL III: CPT | Mod: PBBFAC,,, | Performed by: FAMILY MEDICINE

## 2018-01-04 PROCEDURE — 99499 UNLISTED E&M SERVICE: CPT | Mod: S$GLB,,, | Performed by: FAMILY MEDICINE

## 2018-01-04 PROCEDURE — 99496 TRANSJ CARE MGMT HIGH F2F 7D: CPT | Mod: S$GLB,,, | Performed by: FAMILY MEDICINE

## 2018-01-04 RX ORDER — BENAZEPRIL HYDROCHLORIDE 10 MG/1
10 TABLET ORAL DAILY
Qty: 90 TABLET | Refills: 3 | Status: SHIPPED | OUTPATIENT
Start: 2018-01-04 | End: 2018-12-15 | Stop reason: SDUPTHER

## 2018-01-04 RX ORDER — FERROUS SULFATE 325(65) MG
325 TABLET, DELAYED RELEASE (ENTERIC COATED) ORAL
COMMUNITY
Start: 2017-12-25 | End: 2018-01-24

## 2018-01-11 ENCOUNTER — OFFICE VISIT (OUTPATIENT)
Dept: FAMILY MEDICINE | Facility: CLINIC | Age: 83
End: 2018-01-11
Payer: MEDICARE

## 2018-01-11 VITALS
SYSTOLIC BLOOD PRESSURE: 134 MMHG | HEART RATE: 56 BPM | BODY MASS INDEX: 20.32 KG/M2 | DIASTOLIC BLOOD PRESSURE: 56 MMHG | TEMPERATURE: 98 F | HEIGHT: 64 IN | WEIGHT: 119.06 LBS

## 2018-01-11 DIAGNOSIS — Z12.31 SCREENING MAMMOGRAM, ENCOUNTER FOR: ICD-10-CM

## 2018-01-11 DIAGNOSIS — R30.0 DYSURIA: ICD-10-CM

## 2018-01-11 DIAGNOSIS — R55 SYNCOPE, UNSPECIFIED SYNCOPE TYPE: Primary | ICD-10-CM

## 2018-01-11 DIAGNOSIS — I25.10 CORONARY ARTERY DISEASE, ANGINA PRESENCE UNSPECIFIED, UNSPECIFIED VESSEL OR LESION TYPE, UNSPECIFIED WHETHER NATIVE OR TRANSPLANTED HEART: ICD-10-CM

## 2018-01-11 DIAGNOSIS — I10 ESSENTIAL HYPERTENSION: ICD-10-CM

## 2018-01-11 LAB
BILIRUB UR QL STRIP: NEGATIVE
CLARITY UR: CLEAR
COLOR UR: YELLOW
GLUCOSE UR QL STRIP: NEGATIVE
HGB UR QL STRIP: ABNORMAL
KETONES UR QL STRIP: NEGATIVE
LEUKOCYTE ESTERASE UR QL STRIP: NEGATIVE
NITRITE UR QL STRIP: NEGATIVE
PH UR STRIP: 7 [PH] (ref 5–8)
PROT UR QL STRIP: NEGATIVE
SP GR UR STRIP: 1.01 (ref 1–1.03)
URN SPEC COLLECT METH UR: ABNORMAL

## 2018-01-11 PROCEDURE — 99213 OFFICE O/P EST LOW 20 MIN: CPT | Mod: S$GLB,,, | Performed by: FAMILY MEDICINE

## 2018-01-11 PROCEDURE — 81002 URINALYSIS NONAUTO W/O SCOPE: CPT | Mod: PO

## 2018-01-11 PROCEDURE — 99999 PR PBB SHADOW E&M-EST. PATIENT-LVL III: CPT | Mod: PBBFAC,,, | Performed by: FAMILY MEDICINE

## 2018-01-11 NOTE — PROGRESS NOTES
Tamie Fink presents to fu recent syncope. Gen feeling much better     Past Medical History:   Diagnosis Date    Allergy     Arthritis     Asthma     GERD (gastroesophageal reflux disease)     HEARING LOSS     Hypertension     Lung disease     Pneumonia      Past Surgical History:   Procedure Laterality Date    CAROTID STENT       Review of patient's allergies indicates:  No Known Allergies  Current Outpatient Prescriptions on File Prior to Visit   Medication Sig Dispense Refill    atorvastatin (LIPITOR) 20 MG tablet Take 1 tablet (20 mg total) by mouth once daily. 30 tablet 11    benazepril (LOTENSIN) 10 MG tablet Take 1 tablet (10 mg total) by mouth once daily. 90 tablet 3    clopidogrel (PLAVIX) 75 mg tablet TAKE ONE TABLET BY MOUTH ONCE DAILY 30 tablet 11    ferrous sulfate 325 (65 FE) MG EC tablet Take 325 mg by mouth.      fluticasone (FLONASE) 50 mcg/actuation nasal spray USE TWO SPRAYS IN EACH NOSTRIL EVERY DAY 16 g 12    fluticasone-salmeterol 250-50 mcg/dose (ADVAIR) 250-50 mcg/dose diskus inhaler Inhale 1 puff into the lungs 2 (two) times daily. 60 each 5    gabapentin (NEURONTIN) 300 MG capsule TAKE THREE CAPSULES BY MOUTH IN THE EVENING 90 capsule 11    ipratropium (ATROVENT HFA) 17 mcg/actuation inhaler Inhale 2 puffs into the lungs every 6 (six) hours as needed for Wheezing. 12.9 g 11    omeprazole (PRILOSEC) 20 MG capsule Take 1 capsule (20 mg total) by mouth every evening. 90 capsule 3    SENNOSIDES (SENOKOT ORAL) Take by mouth as needed.      traMADol (ULTRAM) 50 mg tablet TAKE 1 TABLET BY MOUTH EVERY 6 HOURS AS NEEDED 60 tablet 1    meclizine (ANTIVERT) 12.5 mg tablet Take 2 tablets (25 mg total) by mouth 3 (three) times daily as needed. 30 tablet 0     No current facility-administered medications on file prior to visit.      Social History     Social History    Marital status:      Spouse name: N/A    Number of children: N/A    Years of education: N/A      Occupational History    Not on file.     Social History Main Topics    Smoking status: Former Smoker     Packs/day: 1.00     Years: 40.00     Quit date: 5/3/1984    Smokeless tobacco: Never Used    Alcohol use No    Drug use: No    Sexual activity: Not on file     Other Topics Concern    Not on file     Social History Narrative    No narrative on file     Family History   Problem Relation Age of Onset    Cancer Mother     Heart disease Father          ROS:  SKIN: No rashes, itching or changes in color or texture of skin.  EYES: Visual acuity fine. No photophobia, ocular pain or diplopia.EARS: Denies ear pain, discharge or vertigo.NOSE: No loss of smell, no epistaxis some postnasal drip.MOUTH & THROAT: No hoarseness or change in voice. No excessive gum bleeding.CHEST: Denies GERARDO, cyanosis, wheezing  CARDIOVASCULAR: Denies chest pain, PND, orthopnea or reduced exercise tolerance.  ABDOMEN:  No weight loss.No abdominal pain, no hematemesis or blood in stool.  URINARY: No flank pain, dysuria or hematuria.  PERIPHERAL VASCULAR: No claudication or cyanosis.  MUSCULOSKELETAL: Negative   NEUROLOGIC: No history of seizures, paralysis, alteration of gait or coordination.    PE: Vital signs as noted  Heent:Normocephalic with no recent cranial trauma,PERRLA,EOMI,conjunctiva clear,fundi reveal no hemmorhage exudate or papilledema.Otic canals clear, tympanic membranes slightly dull bilaterally.Nasal mucosa slightly red and edematous.Posterior pharynx slightly red but without exudate.  Neck:Supple with minimal anterior cervical adenopathy.  Chest:Clear bilateral breath sounds with mild scattered ronchi  Heart:Regular rhthym without murmer  Abdomen:Soft, non tender,no masses, no hepatosplenomegalyExtremeties and Neurologic:Grossly within normal limits  Impression:[place] was seen today for follow-up.    Diagnoses and all orders for this visit:    Syncope, unspecified syncope type    Coronary artery disease, angina  presence unspecified, unspecified vessel or lesion type, unspecified whether native or transplanted heart    Essential hypertension      Plan: Cont current meds call if BP elevates or any near syncope

## 2018-02-14 ENCOUNTER — TELEPHONE (OUTPATIENT)
Dept: FAMILY MEDICINE | Facility: CLINIC | Age: 83
End: 2018-02-14

## 2018-02-14 NOTE — TELEPHONE ENCOUNTER
Pt just wanting to be sure that its ok just to monitor symptoms for now, states she has been having shooting pain down (l) arm,, no CP, SOB or back pain.

## 2018-02-14 NOTE — TELEPHONE ENCOUNTER
----- Message from Alexandria Santos sent at 2/14/2018  2:36 PM CST -----  Pt at [phone]/////states she has pain in her left arm all day//would like to discuss with you//please call asap//han/javon

## 2018-02-20 ENCOUNTER — HOSPITAL ENCOUNTER (OUTPATIENT)
Dept: RADIOLOGY | Facility: HOSPITAL | Age: 83
Discharge: HOME | End: 2018-02-20
Attending: FAMILY MEDICINE
Payer: MEDICARE

## 2018-02-20 VITALS — BODY MASS INDEX: 20.32 KG/M2 | WEIGHT: 119 LBS | HEIGHT: 64 IN

## 2018-02-20 DIAGNOSIS — Z12.31 SCREENING MAMMOGRAM, ENCOUNTER FOR: ICD-10-CM

## 2018-02-20 PROCEDURE — 77067 SCR MAMMO BI INCL CAD: CPT | Mod: TC,PO

## 2018-02-20 PROCEDURE — 77067 SCR MAMMO BI INCL CAD: CPT | Mod: 26,,, | Performed by: RADIOLOGY

## 2018-02-20 PROCEDURE — 77063 BREAST TOMOSYNTHESIS BI: CPT | Mod: 26,,, | Performed by: RADIOLOGY

## 2018-02-20 RX ORDER — TRAMADOL HYDROCHLORIDE 50 MG/1
TABLET ORAL
Qty: 60 TABLET | Refills: 5 | Status: SHIPPED | OUTPATIENT
Start: 2018-02-20 | End: 2018-08-23 | Stop reason: SDUPTHER

## 2018-02-20 RX ORDER — CLOPIDOGREL BISULFATE 75 MG/1
TABLET ORAL
Qty: 30 TABLET | Refills: 11 | Status: SHIPPED | OUTPATIENT
Start: 2018-02-20 | End: 2019-02-11 | Stop reason: SDUPTHER

## 2018-02-20 RX ORDER — BENAZEPRIL HYDROCHLORIDE 5 MG/1
TABLET ORAL
Qty: 30 TABLET | Refills: 11 | Status: SHIPPED | OUTPATIENT
Start: 2018-02-20 | End: 2018-06-26

## 2018-03-08 RX ORDER — GABAPENTIN 300 MG/1
CAPSULE ORAL
Qty: 90 CAPSULE | Refills: 12 | Status: SHIPPED | OUTPATIENT
Start: 2018-03-08 | End: 2019-02-12

## 2018-05-31 ENCOUNTER — PATIENT MESSAGE (OUTPATIENT)
Dept: FAMILY MEDICINE | Facility: CLINIC | Age: 83
End: 2018-05-31

## 2018-05-31 RX ORDER — FLUCONAZOLE 150 MG/1
150 TABLET ORAL DAILY
Qty: 1 TABLET | Refills: 0 | Status: SHIPPED | OUTPATIENT
Start: 2018-05-31 | End: 2018-06-01

## 2018-06-05 ENCOUNTER — PATIENT MESSAGE (OUTPATIENT)
Dept: FAMILY MEDICINE | Facility: CLINIC | Age: 83
End: 2018-06-05

## 2018-06-05 RX ORDER — FLUCONAZOLE 150 MG/1
150 TABLET ORAL DAILY
Qty: 1 TABLET | Refills: 4 | Status: SHIPPED | OUTPATIENT
Start: 2018-06-05 | End: 2018-06-10

## 2018-06-24 RX ORDER — OMEPRAZOLE 20 MG/1
20 CAPSULE, DELAYED RELEASE ORAL NIGHTLY
Qty: 90 CAPSULE | Refills: 4 | Status: SHIPPED | OUTPATIENT
Start: 2018-06-24 | End: 2019-06-26 | Stop reason: SDUPTHER

## 2018-06-26 ENCOUNTER — OFFICE VISIT (OUTPATIENT)
Dept: FAMILY MEDICINE | Facility: CLINIC | Age: 83
End: 2018-06-26
Payer: MEDICARE

## 2018-06-26 VITALS
SYSTOLIC BLOOD PRESSURE: 113 MMHG | HEART RATE: 70 BPM | BODY MASS INDEX: 20.79 KG/M2 | WEIGHT: 121.81 LBS | TEMPERATURE: 99 F | DIASTOLIC BLOOD PRESSURE: 56 MMHG | HEIGHT: 64 IN

## 2018-06-26 DIAGNOSIS — N95.2 ATROPHIC VAGINITIS: Primary | ICD-10-CM

## 2018-06-26 LAB
BACTERIA GENITAL QL WET PREP: ABNORMAL
CLUE CELLS VAG QL WET PREP: ABNORMAL
FILAMENT FUNGI VAG WET PREP-#/AREA: ABNORMAL
SPECIMEN SOURCE: ABNORMAL
T VAGINALIS GENITAL QL WET PREP: ABNORMAL
WBC #/AREA VAG WET PREP: ABNORMAL
YEAST GENITAL QL WET PREP: ABNORMAL

## 2018-06-26 PROCEDURE — 87210 SMEAR WET MOUNT SALINE/INK: CPT | Mod: PO

## 2018-06-26 PROCEDURE — 99999 PR PBB SHADOW E&M-EST. PATIENT-LVL III: CPT | Mod: PBBFAC,,, | Performed by: NURSE PRACTITIONER

## 2018-06-26 PROCEDURE — 99213 OFFICE O/P EST LOW 20 MIN: CPT | Mod: S$GLB,,, | Performed by: NURSE PRACTITIONER

## 2018-06-26 RX ORDER — TRIAMCINOLONE ACETONIDE 1 MG/G
CREAM TOPICAL 2 TIMES DAILY
Qty: 45 G | Refills: 0 | Status: SHIPPED | OUTPATIENT
Start: 2018-06-26 | End: 2019-02-12

## 2018-06-26 NOTE — PROGRESS NOTES
"Subjective:      Patient ID: Tamie Fink is a 87 y.o. female.    Chief Complaint: Vaginal Itching    HPI   Patient to clinic with complaint of bothersome vaginal itching.  She reports this is been ongoing for several months.  She has taken Diflucan with symptom improvement only to have symptoms return shortly after completing Diflucan.  She does admit that she at times wears a pad due to stress incontinence when coughing.  She feels like this may contribute to vaginal itching and irritation that she is experiencing.  She denies vaginal discharge and vaginal bleeding.  She reports the past she has been treated for "vaginal drying and thinning of vaginal skin" by Gynecology but has not been treated in years.  She cannot identify any other exacerbating or mitigating factors.    Review of Systems   Constitutional: Negative for fatigue and fever.   Respiratory: Negative for cough and shortness of breath.    Cardiovascular: Negative for chest pain and palpitations.   Genitourinary: Negative for difficulty urinating, dysuria, flank pain, frequency, genital sores, hematuria and pelvic pain.        Vaginal itching       Objective:     BP (!) 113/56   Pulse 70   Temp 98.5 °F (36.9 °C) (Oral)   Ht 5' 4" (1.626 m)   Wt 55.2 kg (121 lb 12.8 oz)   BMI 20.91 kg/m²     Physical Exam   Constitutional: She is oriented to person, place, and time. She appears well-developed and well-nourished.   HENT:   Head: Normocephalic.   Eyes: Pupils are equal, round, and reactive to light.   Neck: Normal range of motion.   Cardiovascular: Normal rate, regular rhythm and normal heart sounds.    Pulmonary/Chest: Effort normal and breath sounds normal. No respiratory distress. She has no decreased breath sounds. She has no wheezes. She has no rhonchi. She has no rales.   Genitourinary:   Genitourinary Comments: Epithelium appears pale, smooth and shiny with areas of erythema, petechiae     Musculoskeletal: Normal range of motion. "   Neurological: She is alert and oriented to person, place, and time.   Skin: Skin is warm and dry. No rash noted.   Psychiatric: She has a normal mood and affect. Her behavior is normal. Judgment and thought content normal.   Vitals reviewed.    Assessment:     1. Atrophic vaginitis        Plan:     Problem List Items Addressed This Visit     None      Visit Diagnoses     Atrophic vaginitis    -  Primary    Relevant Medications    triamcinolone acetonide 0.1% (KENALOG) 0.1 % cream    Other Relevant Orders    Vaginal Screen Vagina (Completed)       Will have patient follow up with gynecology if symptoms persist    Follow-up if symptoms worsen or fail to improve.

## 2018-07-20 RX ORDER — ATORVASTATIN CALCIUM 20 MG/1
TABLET, FILM COATED ORAL
Qty: 30 TABLET | Refills: 12 | Status: SHIPPED | OUTPATIENT
Start: 2018-07-20 | End: 2019-08-07 | Stop reason: SDUPTHER

## 2018-08-24 RX ORDER — TRAMADOL HYDROCHLORIDE 50 MG/1
TABLET ORAL
Qty: 60 TABLET | Refills: 0 | Status: SHIPPED | OUTPATIENT
Start: 2018-08-24 | End: 2018-09-12

## 2018-09-12 ENCOUNTER — OFFICE VISIT (OUTPATIENT)
Dept: FAMILY MEDICINE | Facility: CLINIC | Age: 83
End: 2018-09-12
Payer: MEDICARE

## 2018-09-12 VITALS
WEIGHT: 121.19 LBS | TEMPERATURE: 98 F | HEART RATE: 70 BPM | HEIGHT: 64 IN | OXYGEN SATURATION: 94 % | BODY MASS INDEX: 20.69 KG/M2 | SYSTOLIC BLOOD PRESSURE: 109 MMHG | DIASTOLIC BLOOD PRESSURE: 55 MMHG

## 2018-09-12 DIAGNOSIS — I25.10 CORONARY ARTERY DISEASE, ANGINA PRESENCE UNSPECIFIED, UNSPECIFIED VESSEL OR LESION TYPE, UNSPECIFIED WHETHER NATIVE OR TRANSPLANTED HEART: ICD-10-CM

## 2018-09-12 DIAGNOSIS — I10 ESSENTIAL HYPERTENSION: ICD-10-CM

## 2018-09-12 DIAGNOSIS — M19.90 OSTEOARTHRITIS, UNSPECIFIED OSTEOARTHRITIS TYPE, UNSPECIFIED SITE: Primary | ICD-10-CM

## 2018-09-12 DIAGNOSIS — G62.9 NEUROPATHY: ICD-10-CM

## 2018-09-12 PROCEDURE — 99213 OFFICE O/P EST LOW 20 MIN: CPT | Mod: S$PBB,,, | Performed by: FAMILY MEDICINE

## 2018-09-12 PROCEDURE — 1101F PT FALLS ASSESS-DOCD LE1/YR: CPT | Mod: CPTII,,, | Performed by: FAMILY MEDICINE

## 2018-09-12 PROCEDURE — 99499 UNLISTED E&M SERVICE: CPT | Mod: S$GLB,,, | Performed by: FAMILY MEDICINE

## 2018-09-12 PROCEDURE — 99213 OFFICE O/P EST LOW 20 MIN: CPT | Mod: PBBFAC,PO | Performed by: FAMILY MEDICINE

## 2018-09-12 PROCEDURE — 99999 PR PBB SHADOW E&M-EST. PATIENT-LVL III: CPT | Mod: PBBFAC,,, | Performed by: FAMILY MEDICINE

## 2018-09-12 RX ORDER — PREGABALIN 75 MG/1
75 CAPSULE ORAL 2 TIMES DAILY
Qty: 60 CAPSULE | Refills: 6 | Status: SHIPPED | OUTPATIENT
Start: 2018-09-12 | End: 2019-01-09

## 2018-09-12 RX ORDER — TRAMADOL HYDROCHLORIDE 50 MG/1
50 TABLET ORAL EVERY 6 HOURS PRN
Qty: 120 TABLET | Refills: 2 | Status: SHIPPED | OUTPATIENT
Start: 2018-09-12 | End: 2018-12-15 | Stop reason: SDUPTHER

## 2018-09-12 NOTE — PROGRESS NOTES
The patient presents with tender painful area chest for the past few days      ROS:  General: No fever or sig wt change  HEENT:No other PND eye pain or dc  Respiratory: No cough wheezing  PE: vital signs noted  HEENT: Normocephalic,with no recent trauma,PERRLA,EOMI,conjunctiva injected with no exudate.Nasopharynx is injected and edematous.External otic canal edematous and injected TM dull  Neck:Supple without adenopathy  Chest:Clear bilateral breath sounds with mild scattered ronchi Tender ant chest wall   Heart:Regular rhthym without murmer  Abdomen:Soft, non tender,no masses, no hepatosplenomegaly  Extremeties and Neurologic:Grossly within normal limits     Impression:Costochondritis  DJD  Neuropathy  Plan:  Obs-consider inj   RF tramadol       Trty ch maurice to lyrica

## 2018-12-04 ENCOUNTER — TELEPHONE (OUTPATIENT)
Dept: FAMILY MEDICINE | Facility: CLINIC | Age: 83
End: 2018-12-04

## 2018-12-04 NOTE — TELEPHONE ENCOUNTER
----- Message from Laura Enriquez sent at 12/4/2018  2:20 PM CST -----  Contact: self [phone]  States that she would like to be worked in for an appt with Dr Carlos for rt knee pain. Please call back at [phone]//thank you acc

## 2018-12-05 ENCOUNTER — OFFICE VISIT (OUTPATIENT)
Dept: FAMILY MEDICINE | Facility: CLINIC | Age: 83
End: 2018-12-05
Payer: MEDICARE

## 2018-12-05 VITALS
TEMPERATURE: 98 F | WEIGHT: 120.63 LBS | DIASTOLIC BLOOD PRESSURE: 68 MMHG | HEART RATE: 76 BPM | HEIGHT: 64 IN | SYSTOLIC BLOOD PRESSURE: 104 MMHG | BODY MASS INDEX: 20.6 KG/M2

## 2018-12-05 DIAGNOSIS — M13.161 INFLAMMATION OF JOINT OF RIGHT KNEE: Primary | ICD-10-CM

## 2018-12-05 PROCEDURE — 3288F FALL RISK ASSESSMENT DOCD: CPT | Mod: CPTII,S$GLB,, | Performed by: FAMILY MEDICINE

## 2018-12-05 PROCEDURE — 96372 THER/PROPH/DIAG INJ SC/IM: CPT | Mod: S$GLB,,, | Performed by: FAMILY MEDICINE

## 2018-12-05 PROCEDURE — 1100F PTFALLS ASSESS-DOCD GE2>/YR: CPT | Mod: CPTII,S$GLB,, | Performed by: FAMILY MEDICINE

## 2018-12-05 PROCEDURE — 99213 OFFICE O/P EST LOW 20 MIN: CPT | Mod: 25,S$GLB,, | Performed by: FAMILY MEDICINE

## 2018-12-05 PROCEDURE — 99999 PR PBB SHADOW E&M-EST. PATIENT-LVL III: CPT | Mod: PBBFAC,,, | Performed by: FAMILY MEDICINE

## 2018-12-05 RX ORDER — DEXAMETHASONE SODIUM PHOSPHATE 4 MG/ML
8 INJECTION, SOLUTION INTRA-ARTICULAR; INTRALESIONAL; INTRAMUSCULAR; INTRAVENOUS; SOFT TISSUE ONCE
Status: COMPLETED | OUTPATIENT
Start: 2018-12-05 | End: 2018-12-05

## 2018-12-05 RX ADMIN — DEXAMETHASONE SODIUM PHOSPHATE 8 MG: 4 INJECTION, SOLUTION INTRA-ARTICULAR; INTRALESIONAL; INTRAMUSCULAR; INTRAVENOUS; SOFT TISSUE at 03:12

## 2018-12-05 NOTE — PROGRESS NOTES
The patient presents with tender painful rt knee for the past month shortly after falling on rt hip.   ROS:  General: No fever or sig wt change  HEENT:No other PND eye pain or dc  Respiratory: No cough wheezing  PE: vital signs noted  HEENT: Normocephalic,with no recent trauma,PERRLA,EOMI,conjunctiva injected with no exudate.Nasopharynx is injected and edematous.External otic canal edematous and injected TM dull  Neck:Supple without adenopathy  Chest:Clear bilateral breath sounds with mild scattered ronchi  Heart:Regular rhthym without murmer  Abdomen:Soft, non tender,no masses, no hepatosplenomegaly  Extremeties Limited flex/ext,med and lat edema Neurologic:Grossly within normal limits     Impression:Knee inflammation   Plan: Dexa 8

## 2018-12-17 RX ORDER — TRAMADOL HYDROCHLORIDE 50 MG/1
TABLET ORAL
Qty: 120 TABLET | Refills: 0 | Status: SHIPPED | OUTPATIENT
Start: 2018-12-17 | End: 2019-03-26 | Stop reason: SDUPTHER

## 2018-12-17 RX ORDER — BENAZEPRIL HYDROCHLORIDE 10 MG/1
10 TABLET ORAL DAILY
Qty: 90 TABLET | Refills: 3 | Status: SHIPPED | OUTPATIENT
Start: 2018-12-17 | End: 2019-12-22

## 2018-12-18 ENCOUNTER — TELEPHONE (OUTPATIENT)
Dept: FAMILY MEDICINE | Facility: CLINIC | Age: 83
End: 2018-12-18

## 2018-12-18 NOTE — TELEPHONE ENCOUNTER
----- Message from Blaire Cassidy LPN sent at 12/17/2018  5:07 PM CST -----  Caller: [facility] [phone] ext [phone] (Today,  4:30 PM)         States that she faxed over a tiering exception form for atrovent. Please fill out and fax back to [phone]. Please call back at [phone] ext [phone]//thank you acc

## 2018-12-28 ENCOUNTER — TELEPHONE (OUTPATIENT)
Dept: FAMILY MEDICINE | Facility: CLINIC | Age: 83
End: 2018-12-28

## 2018-12-28 NOTE — TELEPHONE ENCOUNTER
----- Message from Cortney Duffy sent at 12/28/2018 10:03 AM CST -----  Contact: Patient   Patient would like a call back at [phone], Regards to if she can eat a grapefruit while on blood thinners.    Thanks  td

## 2018-12-31 ENCOUNTER — TELEPHONE (OUTPATIENT)
Dept: FAMILY MEDICINE | Facility: CLINIC | Age: 83
End: 2018-12-31

## 2018-12-31 ENCOUNTER — OFFICE VISIT (OUTPATIENT)
Dept: FAMILY MEDICINE | Facility: CLINIC | Age: 83
End: 2018-12-31
Payer: MEDICARE

## 2018-12-31 VITALS
WEIGHT: 121.63 LBS | HEIGHT: 64 IN | HEART RATE: 76 BPM | SYSTOLIC BLOOD PRESSURE: 136 MMHG | TEMPERATURE: 99 F | BODY MASS INDEX: 20.76 KG/M2 | DIASTOLIC BLOOD PRESSURE: 63 MMHG

## 2018-12-31 DIAGNOSIS — J40 BRONCHITIS: Primary | ICD-10-CM

## 2018-12-31 PROCEDURE — 99999 PR PBB SHADOW E&M-EST. PATIENT-LVL III: CPT | Mod: PBBFAC,,, | Performed by: FAMILY MEDICINE

## 2018-12-31 PROCEDURE — 99213 OFFICE O/P EST LOW 20 MIN: CPT | Mod: 25,S$GLB,, | Performed by: FAMILY MEDICINE

## 2018-12-31 PROCEDURE — 96372 THER/PROPH/DIAG INJ SC/IM: CPT | Mod: S$GLB,,, | Performed by: FAMILY MEDICINE

## 2018-12-31 PROCEDURE — 1101F PT FALLS ASSESS-DOCD LE1/YR: CPT | Mod: CPTII,S$GLB,, | Performed by: FAMILY MEDICINE

## 2018-12-31 RX ORDER — DOXYCYCLINE HYCLATE 100 MG
100 TABLET ORAL 2 TIMES DAILY
Qty: 20 TABLET | Refills: 0 | Status: SHIPPED | OUTPATIENT
Start: 2018-12-31 | End: 2019-01-07 | Stop reason: ALTCHOICE

## 2018-12-31 RX ORDER — DEXAMETHASONE SODIUM PHOSPHATE 4 MG/ML
8 INJECTION, SOLUTION INTRA-ARTICULAR; INTRALESIONAL; INTRAMUSCULAR; INTRAVENOUS; SOFT TISSUE ONCE
Status: COMPLETED | OUTPATIENT
Start: 2018-12-31 | End: 2018-12-31

## 2018-12-31 RX ADMIN — DEXAMETHASONE SODIUM PHOSPHATE 8 MG: 4 INJECTION, SOLUTION INTRA-ARTICULAR; INTRALESIONAL; INTRAMUSCULAR; INTRAVENOUS; SOFT TISSUE at 09:12

## 2018-12-31 NOTE — PROGRESS NOTES
Tamie Fink presents with moderate upper respiratory congestion,rhinnorhea,moderate cough past 2-3 days. OTC help slightly. Denies nausea,vomiting,diarrhea or significant fever.    Past Medical History:   Diagnosis Date    Allergy     Arthritis     Asthma     GERD (gastroesophageal reflux disease)     HEARING LOSS     Hypertension     Lung disease     Pneumonia      Past Surgical History:   Procedure Laterality Date    CAROTID STENT       Review of patient's allergies indicates:   Allergen Reactions    Montelukast Other (See Comments)     Weakness and disorientation      Current Outpatient Medications on File Prior to Visit   Medication Sig Dispense Refill    atorvastatin (LIPITOR) 20 MG tablet TAKE 1 TABLET BY MOUTH ONCE DAILY 30 tablet 12    benazepril (LOTENSIN) 10 MG tablet Take 1 tablet (10 mg total) by mouth once daily. 90 tablet 3    clopidogrel (PLAVIX) 75 mg tablet TAKE ONE TABLET BY MOUTH ONCE DAILY 30 tablet 11    fluticasone-salmeterol 250-50 mcg/dose (ADVAIR) 250-50 mcg/dose diskus inhaler Inhale 1 puff into the lungs 2 (two) times daily. 60 each 5    gabapentin (NEURONTIN) 300 MG capsule TAKE THREE CAPSULES BY MOUTH EVERY EVENING 90 capsule 12    ipratropium (ATROVENT HFA) 17 mcg/actuation inhaler Inhale 2 puffs into the lungs every 6 (six) hours as needed for Wheezing. 12.9 g 11    omeprazole (PRILOSEC) 20 MG capsule Take 1 capsule (20 mg total) by mouth every evening. 90 capsule 4    pregabalin (LYRICA) 75 MG capsule Take 1 capsule (75 mg total) by mouth 2 (two) times daily. 60 capsule 6    SENNOSIDES (SENOKOT ORAL) Take by mouth as needed.      traMADol (ULTRAM) 50 mg tablet TAKE 1 TABLET BY MOUTH EVERY 6 HOURS AS NEEDED 120 tablet 0    triamcinolone acetonide 0.1% (KENALOG) 0.1 % cream Apply topically 2 (two) times daily. for 10 days 45 g 0     No current facility-administered medications on file prior to visit.      Social History     Socioeconomic History    Marital  status:      Spouse name: Not on file    Number of children: Not on file    Years of education: Not on file    Highest education level: Not on file   Social Needs    Financial resource strain: Not on file    Food insecurity - worry: Not on file    Food insecurity - inability: Not on file    Transportation needs - medical: Not on file    Transportation needs - non-medical: Not on file   Occupational History    Not on file   Tobacco Use    Smoking status: Former Smoker     Packs/day: 1.00     Years: 40.00     Pack years: 40.00     Last attempt to quit: 5/3/1984     Years since quittin.6    Smokeless tobacco: Never Used   Substance and Sexual Activity    Alcohol use: No    Drug use: No    Sexual activity: Not on file   Other Topics Concern    Not on file   Social History Narrative    Not on file     Family History   Problem Relation Age of Onset    Cancer Mother     Heart disease Father     Breast cancer Sister     Breast cancer Maternal Aunt     Breast cancer Maternal Grandmother     Breast cancer Cousin          ROS:  SKIN: No rashes, itching or changes in color or texture of skin.  EYES: Visual acuity fine. No photophobia, ocular pain or diplopia.EARS: Denies ear pain, discharge or vertigo.NOSE: No loss of smell, no epistaxis some postnasal drip.MOUTH & THROAT: No hoarseness or change in voice. No excessive gum bleeding.CHEST: Denies GERARDO, cyanosis, wheezing  CARDIOVASCULAR: Denies chest pain, PND, orthopnea or reduced exercise tolerance.  ABDOMEN:  No weight loss.No abdominal pain, no hematemesis or blood in stool.  URINARY: No flank pain, dysuria or hematuria.  PERIPHERAL VASCULAR: No claudication or cyanosis.  MUSCULOSKELETAL: Negative   NEUROLOGIC: No history of seizures, paralysis, alteration of gait or coordination.    PE: Vital signs as noted  Heent:Normocephalic with no recent cranial trauma,PERRLA,EOMI,conjunctiva clear,fundi reveal no hemmorhage exudate or papilledema.Otic  canals clear, tympanic membranes slightly dull bilaterally.Nasal mucosa slightly red and edematous.Posterior pharynx slightly red but without exudate.  Neck:Supple with minimal anterior cervical adenopathy.  Chest:Clear bilateral breath sounds with mild scattered ronchi  Heart:Regular rhthym without murmer  Abdomen:Soft, non tender,no masses, no hepatosplenomegalyExtremeties and Neurologic:Grossly within normal limits  Impression: Exac chronic bronchitis   Plan: Dexa 8 im  If ftp printing rx for doxycycline

## 2018-12-31 NOTE — TELEPHONE ENCOUNTER
----- Message from Amy Crespo sent at 12/31/2018  8:12 AM CST -----  Contact: [place] [phone]  Pt states that she is really not feeling well and needs to be seen today. Please contact pt at [phone].

## 2019-01-07 ENCOUNTER — PATIENT OUTREACH (OUTPATIENT)
Dept: ADMINISTRATIVE | Facility: CLINIC | Age: 84
End: 2019-01-07

## 2019-01-07 RX ORDER — LEVOFLOXACIN 750 MG/1
750 TABLET ORAL DAILY
COMMUNITY
End: 2019-02-12

## 2019-01-07 RX ORDER — PREDNISONE 10 MG/1
10 TABLET ORAL DAILY
COMMUNITY
End: 2019-01-09

## 2019-01-07 NOTE — PATIENT INSTRUCTIONS
Understanding Bronchiectasis  Bronchiectasis is a condition in which the airways of the lungs (bronchi and bronchioles) become wider than normal. Over time, the walls of the airways become thick and scarred. The damaged airways cant clear mucus as well. Because of this, mucus builds up in the airways. This increases the risk for lung infections. Bronchiectasis is a long-term (chronic) condition.  What causes bronchiectasis?  Doctors do not know exactly what causes this condition. It occurs in people with lung infections who have long-term damage to the airways from other health problems.  Smokers and those with long-term lung disease are more likely to develop bronchiectasis. Some of the conditions that increase the chance for bronchiectasis include:  · Cystic fibrosis  · Lung infections such as pneumonia, tuberculosis, or whooping cough  · Chronic obstructive pulmonary disease (COPD)  · Problems with the bodys defense (immune) system  · Allergic bronchopulmonary aspergillosis (ABPA)  · Long-term problem with inhaling food or liquids into the lungs (aspiration)  · Certain autoimmune diseases such as rheumatoid arthritis  · Blocked airway, such as from a tumor or inhaled object  · Lung problems that are present at birth (congenital)  What are the symptoms of bronchiectasis?  Damage to the airways often starts in childhood. You may not have symptoms until months or years after repeated lung infections. Some people have few or no symptoms. Others have daily symptoms that get worse over time. Common symptoms include:  · Long-term cough  · Coughing up a lot of thick mucus that may have blood in it  · Trouble breathing  · Inflammation of the nose and sinuses (rhinosinusitis)  · Inflammation of the covering of the lungs (pleurisy or pleuritis)  · Feeling tired  How is bronchiectasis diagnosed?  Your healthcare provider will ask about your past health and symptoms. Youll also have a physical exam. This includes  listening to your chest with a stethoscope. You may need tests to help with the diagnosis, including:  · Blood tests. These check for infection, immune system problems, and overall health.  · Sputum culture. This is a test of the mucus in your lungs. Its checked for a bacterial or fungal infection.  · Chest X-ray. This is done to get information about your heart and lungs.  · Chest CT scan. This gives detailed pictures of your airway. Its most often used to make the diagnosis.  · Lung function and exercise tests. They include spirometry and a 6-minute walk test. These tests show how well your lungs work and if you are able to get enough oxygen into your body, even when exerting yourself.  Date Last Reviewed: 3/1/2017  © 7874-7494 [employer]. [address] [place] [address], [place] [address]. All rights reserved. This information is not intended as a substitute for professional medical care. Always follow your healthcare professional's instructions.

## 2019-01-09 ENCOUNTER — OFFICE VISIT (OUTPATIENT)
Dept: FAMILY MEDICINE | Facility: CLINIC | Age: 84
End: 2019-01-09
Payer: MEDICARE

## 2019-01-09 VITALS
DIASTOLIC BLOOD PRESSURE: 51 MMHG | OXYGEN SATURATION: 97 % | WEIGHT: 122.19 LBS | BODY MASS INDEX: 20.86 KG/M2 | HEIGHT: 64 IN | SYSTOLIC BLOOD PRESSURE: 115 MMHG | HEART RATE: 74 BPM | TEMPERATURE: 99 F

## 2019-01-09 DIAGNOSIS — J47.1 BRONCHIECTASIS WITH ACUTE EXACERBATION: Primary | ICD-10-CM

## 2019-01-09 PROCEDURE — 99495 TCM SERVICES (MODERATE COMPLEXITY): ICD-10-PCS | Mod: S$GLB,,, | Performed by: FAMILY MEDICINE

## 2019-01-09 PROCEDURE — 99499 RISK ADDL DX/OHS AUDIT: ICD-10-PCS | Mod: S$GLB,,, | Performed by: FAMILY MEDICINE

## 2019-01-09 PROCEDURE — 99495 TRANSJ CARE MGMT MOD F2F 14D: CPT | Mod: S$GLB,,, | Performed by: FAMILY MEDICINE

## 2019-01-09 PROCEDURE — 99999 PR PBB SHADOW E&M-EST. PATIENT-LVL IV: CPT | Mod: PBBFAC,,, | Performed by: FAMILY MEDICINE

## 2019-01-09 PROCEDURE — 99999 PR PBB SHADOW E&M-EST. PATIENT-LVL IV: ICD-10-PCS | Mod: PBBFAC,,, | Performed by: FAMILY MEDICINE

## 2019-01-09 PROCEDURE — 99499 UNLISTED E&M SERVICE: CPT | Mod: S$GLB,,, | Performed by: FAMILY MEDICINE

## 2019-01-09 RX ORDER — ALBUTEROL SULFATE 90 UG/1
2 AEROSOL, METERED RESPIRATORY (INHALATION)
COMMUNITY
Start: 2019-01-02

## 2019-01-09 RX ORDER — LEVOFLOXACIN 750 MG/1
750 TABLET ORAL
COMMUNITY
Start: 2019-01-05 | End: 2019-01-09

## 2019-01-09 RX ORDER — PREGABALIN 75 MG/1
75 CAPSULE ORAL 2 TIMES DAILY
Qty: 60 CAPSULE | Refills: 6 | Status: SHIPPED | OUTPATIENT
Start: 2019-01-09 | End: 2019-07-01

## 2019-01-09 RX ORDER — PREDNISONE 20 MG/1
TABLET ORAL
Refills: 0 | COMMUNITY
Start: 2019-01-05 | End: 2019-02-12

## 2019-01-09 NOTE — PROGRESS NOTES
Transitional Care Note  Subjective:       Patient ID: Tamie Fink is a 87 y.o. female.  Chief Complaint: hosp f/u Bronchiectasis    Family and/or Caretaker present at visit?  No.  Diagnostic tests reviewed/disposition: No diagnosic tests pending after this hospitalization.  Disease/illness education: Bronchiectasis  Home health/community services discussion/referrals: Patient does not have home health established from hospital visit.  They do not need home health.  If needed, we will set up home health for the patient.   Establishment or re-establishment of referral orders for community resources: No other necessary community resources.   Discussion with other health care providers: No discussion with other health care providers necessary.   HPI  Review of Systems    Objective:      Physical Exam    Assessment:       No diagnosis found.    Plan:       See below       Tamie Fink presents with moderate upper respiratory congestion,rhinnorhea,moderate cough past 2-3 days. OTC help slightly. Denies nausea,vomiting,diarrhea or significant fever.    Past Medical History:   Diagnosis Date    Allergy     Arthritis     Asthma     GERD (gastroesophageal reflux disease)     HEARING LOSS     Hypertension     Lung disease     Pneumonia      Past Surgical History:   Procedure Laterality Date    CAROTID STENT       Review of patient's allergies indicates:   Allergen Reactions    Montelukast Other (See Comments)     Weakness and disorientation      Current Outpatient Medications on File Prior to Visit   Medication Sig Dispense Refill    albuterol (VENTOLIN HFA) 90 mcg/actuation inhaler Inhale 2 puffs into the lungs.      atorvastatin (LIPITOR) 20 MG tablet TAKE 1 TABLET BY MOUTH ONCE DAILY 30 tablet 12    benazepril (LOTENSIN) 10 MG tablet Take 1 tablet (10 mg total) by mouth once daily. 90 tablet 3    clopidogrel (PLAVIX) 75 mg tablet TAKE ONE TABLET BY MOUTH ONCE DAILY 30 tablet 11     fluticasone-salmeterol 250-50 mcg/dose (ADVAIR) 250-50 mcg/dose diskus inhaler Inhale 1 puff into the lungs 2 (two) times daily. 60 each 5    gabapentin (NEURONTIN) 300 MG capsule TAKE THREE CAPSULES BY MOUTH EVERY EVENING 90 capsule 12    ipratropium (ATROVENT HFA) 17 mcg/actuation inhaler Inhale 2 puffs into the lungs every 6 (six) hours as needed for Wheezing. 12.9 g 11    levoFLOXacin (LEVAQUIN) 750 MG tablet Take 750 mg by mouth once daily.      omeprazole (PRILOSEC) 20 MG capsule Take 1 capsule (20 mg total) by mouth every evening. 90 capsule 4    predniSONE (DELTASONE) 20 MG tablet TAKE 1 TABLET BY MOUTH ONCE DAILY FOR TEN DAYS  0    pregabalin (LYRICA) 75 MG capsule Take 1 capsule (75 mg total) by mouth 2 (two) times daily. 60 capsule 6    SENNOSIDES (SENOKOT ORAL) Take by mouth as needed.      traMADol (ULTRAM) 50 mg tablet TAKE 1 TABLET BY MOUTH EVERY 6 HOURS AS NEEDED 120 tablet 0    triamcinolone acetonide 0.1% (KENALOG) 0.1 % cream Apply topically 2 (two) times daily. for 10 days 45 g 0    [DISCONTINUED] levoFLOXacin (LEVAQUIN) 750 MG tablet Take 750 mg by mouth.      [DISCONTINUED] predniSONE (DELTASONE) 10 MG tablet Take 10 mg by mouth once daily.       No current facility-administered medications on file prior to visit.      Social History     Socioeconomic History    Marital status:      Spouse name: Not on file    Number of children: Not on file    Years of education: Not on file    Highest education level: Not on file   Social Needs    Financial resource strain: Not on file    Food insecurity - worry: Not on file    Food insecurity - inability: Not on file    Transportation needs - medical: Not on file    Transportation needs - non-medical: Not on file   Occupational History    Not on file   Tobacco Use    Smoking status: Former Smoker     Packs/day: 1.00     Years: 40.00     Pack years: 40.00     Last attempt to quit: 5/3/1984     Years since quittin.7     Smokeless tobacco: Never Used   Substance and Sexual Activity    Alcohol use: No    Drug use: No    Sexual activity: Not on file   Other Topics Concern    Not on file   Social History Narrative    Not on file     Family History   Problem Relation Age of Onset    Cancer Mother     Heart disease Father     Breast cancer Sister     Breast cancer Maternal Aunt     Breast cancer Maternal Grandmother     Breast cancer Cousin          ROS:  SKIN: No rashes, itching or changes in color or texture of skin.  EYES: Visual acuity fine. No photophobia, ocular pain or diplopia.EARS: Denies ear pain, discharge or vertigo.NOSE: No loss of smell, no epistaxis some postnasal drip.MOUTH & THROAT: No hoarseness or change in voice. No excessive gum bleeding.CHEST: Denies GERRADO, cyanosis, wheezing  CARDIOVASCULAR: Denies chest pain, PND, orthopnea or reduced exercise tolerance.  ABDOMEN:  No weight loss.No abdominal pain, no hematemesis or blood in stool.  URINARY: No flank pain, dysuria or hematuria.  PERIPHERAL VASCULAR: No claudication or cyanosis.  MUSCULOSKELETAL: Negative   NEUROLOGIC: No history of seizures, paralysis, alteration of gait or coordination.    PE: Vital signs as noted  Heent:Normocephalic with no recent cranial trauma,PERRLA,EOMI,conjunctiva clear,fundi reveal no hemmorhage exudate or papilledema.Otic canals clear, tympanic membranes slightly dull bilaterally.Nasal mucosa slightly red and edematous.Posterior pharynx slightly red but without exudate.  Neck:Supple with minimal anterior cervical adenopathy.  Chest:Clear bilateral breath sounds with min scattered ronchi  Heart:Regular rhthym without murmer  Abdomen:Soft, non tender,no masses, no hepatosplenomegalyExtremeties and Neurologic:Grossly within normal limits  \\  Hospital Course and Treatment:   Admission Information   Date & Time  1/2/2019 Provider  Siomara Tang MD Department  [facility] Medicine Dept. Phone  [phone]        #Acute hypoxemic respiratory failure, resolved  -Treated with abx & steroids  -On room air at time of discharge, ambulating.    #Bronchiectasis exacerbation   -History of bronchoscopy in 2013 with pseudomonas sensitive to levaquin & negative AFB. High resolution CT Chest from 12/12/18 showed stable cavity lung disease.  -AFB x 2 negative this admission.  -Sputum culture prelim GNR. Patient advised that speciation and sensitivites pending. Advised to follow up with Dr. Carlos and Dr. Flores by Friday to ensure abx regimen appropriate.   -Leukocytosis improved. Hemodynamically stable and afebrile.   -Levaquin IV & Methylpred IV given during stay  -Pulmonology consulted with recommendations to transition to Levaquin 750 mg oral x 7 days & Prednisone 20 mg daily x 10 days (meds sent to [facility])    #Mixed obstructive/restrictive lung disease  -Resume home Advair     #HTN  Stable.   -Resume home regimen    #CAD s/p stenting   -Follows with Dr. Camejo  -Continue ASA + Plavix     #HLD  -Continue statin.    I discussed with the patient disease process and treatment.     I have personally seen and examined the patient, Tamie Fink, in a face to face encounter on the date of discharge.     She is cleared for discharge with instructions to follow up as directed.   Total time in the care and discharge planning of this patient was greater than 30 minutes.    Significant Diagnostic Studies:  Recent Imaging and Procedure Results   Procedure Component Value Ref Range Date/Time   Urine culture [[id number]] Collected: 01/03/2019 0100   Specimen: N/A from Urine CC Updated: 01/05/2019 0749   Micro Culture Result No significant growth recovered   AFB stain [[id number]] Collected: 01/03/2019 0835   Specimen: N/A from Sputum Expectorated Updated: 01/03/2019 1205   AFB Direct Smear Result No acid fast bacillus seen.       Surgical Procedures during this visit:    Patient's Condition On Discharge:   Good    Physical Exam    Constitutional: She is oriented to person, place, and time. She appears well-developed and well-nourished. No distress.   Sitting up in bedside chair playing on ipad. On room air speaking in full sentences. Reports she is ready to go home.   HENT:   Head: Normocephalic and atraumatic.   Mouth/Throat: Oropharynx is clear and moist.   Eyes: Pupils are equal, round, and reactive to light. Conjunctivae are normal. No scleral icterus.   Neck: Normal range of motion. Neck supple. No JVD present.   Cardiovascular: Normal rate, regular rhythm and normal heart sounds. Exam reveals no gallop and no friction rub.   No murmur heard.  Pulmonary/Chest: Effort normal. No respiratory distress. She has no wheezes. She has rales (inspiratory rales in LLL).   Abdominal: Soft. Bowel sounds are normal. She exhibits no distension. There is no tenderness.   Musculoskeletal: Normal range of motion. She exhibits no edema.   Neurological: She is alert and oriented to person, place, and time. No sensory deficit.   Skin: Skin is warm and dry. No rash noted.   Psychiatric: She has a normal mood and affect.   Nursing note and vitals reviewed.      Discharge Disposition:   Order for Discharge (From admission, onward)   Start Ordered   01/05/19 1034 Discharge Disposition to: Home or Self Care Once   Question: Discharge Disposition to Answer: Home or Self Care   Start Status   01/05/19 1034 Completed Order ID: [id number]     01/05/19 1035   01/05/19 0000 Follow-up with PCP Schedule for: 5; Days   Comments: Follow up with Dr. Flores as well.   Question Answer Comment   Schedule for 5   when Days     01/05/19 1035         Discharge Orders:  Follow-up Information   Amador Carlos MD. Schedule an appointment as soon as possible for a visit in 1 week(s).   Specialty: Family Medicine  Why: Call Monday to make appointment to see Dr. Carlos in 1 week  Contact information:  [address] [facility] [address] [phone]        Bright Flores MD.  Schedule an appointment as soon as possible for a visit in 1 week(s).   Specialty: Pulmonary Disease  Why: Make appointment to see Dr. Flores in 1 week  Contact information:  [address]  [facility] [address] [phone]            Immunizations Administered for This Admission   No immunizations given this admission.         Medication List     START taking these medications   levoFLOXacin 750 MG tablet  Quantity: 7 tablet  Commonly known as: LEVAQUIN  Take 1 tablet (750 mg total) by mouth daily for 7 days      predniSONE 20 MG tablet  Quantity: 10 tablet  Commonly known as: DELTASONE  Take 1 tablet (20 mg total) by mouth daily for 10 days        CONTINUE taking these medications   albuterol 90 mcg/actuation inhaler  Quantity: 1 Inhaler  Commonly known as: VENTOLIN HFA  Inhale 2 puffs into the lungs every 6 (six) hours as needed for Wheezing      atorvastatin 20 MG tablet  Commonly known as: LIPITOR  Take 20 mg by mouth nightly.      benazepril 10 MG tablet  Commonly known as: LOTENSIN  Take 1 tablet (10 mg total) by mouth once daily.      clopidogrel 75 mg tablet  Commonly known as: PLAVIX  Take 75 mg by mouth daily      fluticasone-salmeterol 250-50 mcg/dose diskus inhaler  Quantity: 1 Inhaler  Commonly known as: ADVAIR DISKUS  Inhale 1 puff into the lungs 2 (two) times daily.      gabapentin 300 MG capsule  Commonly known as: NEURONTIN  Take 900 mg by mouth nightly.      omeprazole 20 MG capsule  Quantity: 30 capsule  Commonly known as: PriLOSEC  TAKE ONE CAPSULE BY MOUTH at night      traMADol 50 mg tablet  Commonly known as: ULTRAM  Take 50 mg by mouth every 6 (six) hours as needed.          Where to Get Your Medications     These medications were [facility]  [place] [address] [address]   Phone: [phone]   · levoFLOXacin 750 MG tablet  · predniSONE 20 MG tablet      Discharge Orders   Future Labs/Procedures Expected by  Expires   Activity as tolerated As directed   Diet (Select type) Cardiac/Low Chol/JESSIE As directed   Questions:   Diet Type: Cardiac/Low Chol/JESSIE   Other Restriction(s):   Liquid Consistency:   Sodium Restriction:   Fluid restriction:   Preferences:   Follow-up with PCP Schedule for: 5; Days As directed   Comments:   Follow up with Dr. Flores as well.   Questions:   Schedule for: 5   when: Days         Hospital Course and Treatment:   Admission Information   Date & Time  1/2/2019 Provider  Siomara Tang MD Department  [facility] Medicine Dept. Phone  [phone]       #Acute hypoxemic respiratory failure, resolved  -Treated with abx & steroids  -On room air at time of discharge, ambulating.    #Bronchiectasis exacerbation   -History of bronchoscopy in 2013 with pseudomonas sensitive to levaquin & negative AFB. High resolution CT Chest from 12/12/18 showed stable cavity lung disease.  -AFB x 2 negative this admission.  -Sputum culture prelim GNR. Patient advised that speciation and sensitivites pending. Advised to follow up with Dr. Carlos and Dr. Flores by Friday to ensure abx regimen appropriate.   -Leukocytosis improved. Hemodynamically stable and afebrile.   -Levaquin IV & Methylpred IV given during stay  -Pulmonology consulted with recommendations to transition to Levaquin 750 mg oral x 7 days & Prednisone 20 mg daily x 10 days (meds sent to [facility])    #Mixed obstructive/restrictive lung disease  -Resume home Advair     #HTN  Stable.   -Resume home regimen    #CAD s/p stenting   -Follows with Dr. Camejo  -Continue ASA + Plavix     #HLD  -Continue statin.    I discussed with the patient disease process and treatment.     I have personally seen and examined the patient, Tamie Fink, in a face to face encounter on the date of discharge.     She is cleared for discharge with instructions to follow up as directed.   Total time in the care and discharge planning of this patient was greater than 30  minutes.    Significant Diagnostic Studies:  Recent Imaging and Procedure Results   Procedure Component Value Ref Range Date/Time   Urine culture [[id number]] Collected: 01/03/2019 0100   Specimen: N/A from Urine CC Updated: 01/05/2019 0749   Micro Culture Result No significant growth recovered   AFB stain [[id number]] Collected: 01/03/2019 0835   Specimen: N/A from Sputum Expectorated Updated: 01/03/2019 1205   AFB Direct Smear Result No acid fast bacillus seen.       Surgical Procedures during this visit:    Patient's Condition On Discharge:   Good    Physical Exam   Constitutional: She is oriented to person, place, and time. She appears well-developed and well-nourished. No distress.   Sitting up in bedside chair playing on ipad. On room air speaking in full sentences. Reports she is ready to go home.   HENT:   Head: Normocephalic and atraumatic.   Mouth/Throat: Oropharynx is clear and moist.   Eyes: Pupils are equal, round, and reactive to light. Conjunctivae are normal. No scleral icterus.   Neck: Normal range of motion. Neck supple. No JVD present.   Cardiovascular: Normal rate, regular rhythm and normal heart sounds. Exam reveals no gallop and no friction rub.   No murmur heard.  Pulmonary/Chest: Effort normal. No respiratory distress. She has no wheezes. She has rales (inspiratory rales in LLL).   Abdominal: Soft. Bowel sounds are normal. She exhibits no distension. There is no tenderness.   Musculoskeletal: Normal range of motion. She exhibits no edema.   Neurological: She is alert and oriented to person, place, and time. No sensory deficit.   Skin: Skin is warm and dry. No rash noted.   Psychiatric: She has a normal mood and affect.   Nursing note and vitals reviewed.      Discharge Disposition:   Order for Discharge (From admission, onward)   Start Ordered   01/05/19 1034 Discharge Disposition to: Home or Self Care Once   Question: Discharge Disposition to Answer: Home or Self Care   Start Status    01/05/19 1034 Completed Order ID: [id number]     01/05/19 1035   01/05/19 0000 Follow-up with PCP Schedule for: 5; Days   Comments: Follow up with Dr. Flores as well.   Question Answer Comment   Schedule for 5   when Days     01/05/19 1035         Discharge Orders:  Follow-up Information   Amador Carlos MD. Schedule an appointment as soon as possible for a visit in 1 week(s).   Specialty: Family Medicine  Why: Call Monday to make appointment to see Dr. Carlos in 1 week  Contact information:  [address] [facility] [address]  [phone]        Bright Flores MD. Schedule an appointment as soon as possible for a visit in 1 week(s).   Specialty: Pulmonary Disease  Why: Make appointment to see Dr. Flores in 1 week  Contact information:  [address]  [facility] [address]  [phone]            Immunizations Administered for This Admission   No immunizations given this admission.         Medication List     START taking these medications   levoFLOXacin 750 MG tablet  Quantity: 7 tablet  Commonly known as: LEVAQUIN  Take 1 tablet (750 mg total) by mouth daily for 7 days      predniSONE 20 MG tablet  Quantity: 10 tablet  Commonly known as: DELTASONE  Take 1 tablet (20 mg total) by mouth daily for 10 days        CONTINUE taking these medications   albuterol 90 mcg/actuation inhaler  Quantity: 1 Inhaler  Commonly known as: VENTOLIN HFA  Inhale 2 puffs into the lungs every 6 (six) hours as needed for Wheezing      atorvastatin 20 MG tablet  Commonly known as: LIPITOR  Take 20 mg by mouth nightly.      benazepril 10 MG tablet  Commonly known as: LOTENSIN  Take 1 tablet (10 mg total) by mouth once daily.      clopidogrel 75 mg tablet  Commonly known as: PLAVIX  Take 75 mg by mouth daily      fluticasone-salmeterol 250-50 mcg/dose diskus inhaler  Quantity: 1 Inhaler  Commonly known as: ADVAIR DISKUS  Inhale 1 puff into the lungs 2 (two) times daily.      gabapentin 300 MG capsule  Commonly known as:  NEURONTIN  Take 900 mg by mouth nightly.      omeprazole 20 MG capsule  Quantity: 30 capsule  Commonly known as: PriLOSEC  TAKE ONE CAPSULE BY MOUTH at night      traMADol 50 mg tablet  Commonly known as: ULTRAM  Take 50 mg by mouth every 6 (six) hours as needed.          Where to Get Your Medications     These medications were sent to [facility] - [place] - [address] [facility] [address] [facility] [address]   Phone: [phone]   · levoFLOXacin 750 MG tablet  · predniSONE 20 MG tablet      Discharge Orders   Future Labs/Procedures Expected by Expires   Activity as tolerated As directed   Diet (Select type) Cardiac/Low Chol/JESSIE As directed   Questions:   Diet Type: Cardiac/Low Chol/JESSIE   Other Restriction(s):   Liquid Consistency:   Sodium Restriction:   Fluid restriction:   Preferences:   Follow-up with PCP Schedule for: 5; Days As directed   Comments:   Follow up with Dr. Flores as well.   Questions:   Schedule for: 5   when: Days

## 2019-02-11 ENCOUNTER — PATIENT OUTREACH (OUTPATIENT)
Dept: ADMINISTRATIVE | Facility: HOSPITAL | Age: 84
End: 2019-02-11

## 2019-02-11 ENCOUNTER — TELEPHONE (OUTPATIENT)
Dept: FAMILY MEDICINE | Facility: CLINIC | Age: 84
End: 2019-02-11

## 2019-02-11 RX ORDER — CLOPIDOGREL BISULFATE 75 MG/1
TABLET ORAL
Qty: 30 TABLET | Refills: 11 | Status: SHIPPED | OUTPATIENT
Start: 2019-02-11 | End: 2019-10-08 | Stop reason: SDUPTHER

## 2019-02-11 NOTE — TELEPHONE ENCOUNTER
----- Message from Rose Oliveira sent at 2/11/2019 12:18 PM CST -----  .Type:  Sooner Apoointment Request    Caller is requesting a sooner appointment.  Caller declined first available appointment listed below.  Caller will not accept being placed on the waitlist and is requesting a message be sent to doctor.  Name of Caller:pt  When is the first available appointment?4/4  Symptoms:right hand, leg pain  Would the patient rather a call back or a response via [email or web address]? callback  Best Call Back Number:.[phone] (home)     Additional Information: prefers , needs today or tomorrow

## 2019-02-12 ENCOUNTER — OFFICE VISIT (OUTPATIENT)
Dept: FAMILY MEDICINE | Facility: CLINIC | Age: 84
End: 2019-02-12
Payer: MEDICARE

## 2019-02-12 ENCOUNTER — HOSPITAL ENCOUNTER (OUTPATIENT)
Dept: RADIOLOGY | Facility: HOSPITAL | Age: 84
Discharge: HOME | End: 2019-02-12
Attending: FAMILY MEDICINE
Payer: MEDICARE

## 2019-02-12 VITALS
BODY MASS INDEX: 20.66 KG/M2 | HEIGHT: 64 IN | SYSTOLIC BLOOD PRESSURE: 127 MMHG | WEIGHT: 121 LBS | TEMPERATURE: 98 F | HEART RATE: 64 BPM | DIASTOLIC BLOOD PRESSURE: 58 MMHG | OXYGEN SATURATION: 97 %

## 2019-02-12 DIAGNOSIS — I10 ESSENTIAL HYPERTENSION: ICD-10-CM

## 2019-02-12 DIAGNOSIS — S70.01XD CONTUSION OF RIGHT HIP, SUBSEQUENT ENCOUNTER: ICD-10-CM

## 2019-02-12 DIAGNOSIS — M54.30 SCIATICA, UNSPECIFIED LATERALITY: ICD-10-CM

## 2019-02-12 DIAGNOSIS — R42 DIZZINESS: ICD-10-CM

## 2019-02-12 DIAGNOSIS — E78.5 HYPERLIPIDEMIA, UNSPECIFIED HYPERLIPIDEMIA TYPE: Primary | ICD-10-CM

## 2019-02-12 PROCEDURE — 1100F PR PT FALLS ASSESS DOC 2+ FALLS/FALL W/INJURY/YR: ICD-10-PCS | Mod: CPTII,S$GLB,, | Performed by: FAMILY MEDICINE

## 2019-02-12 PROCEDURE — 73552 XR FEMUR 2 VIEW RIGHT: ICD-10-PCS | Mod: 26,RT,, | Performed by: RADIOLOGY

## 2019-02-12 PROCEDURE — 3288F PR FALLS RISK ASSESSMENT DOCUMENTED: ICD-10-PCS | Mod: CPTII,S$GLB,, | Performed by: FAMILY MEDICINE

## 2019-02-12 PROCEDURE — 73552 X-RAY EXAM OF FEMUR 2/>: CPT | Mod: TC,PO,RT

## 2019-02-12 PROCEDURE — 3288F FALL RISK ASSESSMENT DOCD: CPT | Mod: CPTII,S$GLB,, | Performed by: FAMILY MEDICINE

## 2019-02-12 PROCEDURE — 96372 PR INJECTION,THERAP/PROPH/DIAG2ST, IM OR SUBCUT: ICD-10-PCS | Mod: S$GLB,,, | Performed by: FAMILY MEDICINE

## 2019-02-12 PROCEDURE — 96372 THER/PROPH/DIAG INJ SC/IM: CPT | Mod: S$GLB,,, | Performed by: FAMILY MEDICINE

## 2019-02-12 PROCEDURE — 99213 PR OFFICE/OUTPT VISIT, EST, LEVL III, 20-29 MIN: ICD-10-PCS | Mod: 25,S$GLB,, | Performed by: FAMILY MEDICINE

## 2019-02-12 PROCEDURE — 99999 PR PBB SHADOW E&M-EST. PATIENT-LVL III: ICD-10-PCS | Mod: PBBFAC,,, | Performed by: FAMILY MEDICINE

## 2019-02-12 PROCEDURE — 73552 X-RAY EXAM OF FEMUR 2/>: CPT | Mod: 26,RT,, | Performed by: RADIOLOGY

## 2019-02-12 PROCEDURE — 99999 PR PBB SHADOW E&M-EST. PATIENT-LVL III: CPT | Mod: PBBFAC,,, | Performed by: FAMILY MEDICINE

## 2019-02-12 PROCEDURE — 1100F PTFALLS ASSESS-DOCD GE2>/YR: CPT | Mod: CPTII,S$GLB,, | Performed by: FAMILY MEDICINE

## 2019-02-12 PROCEDURE — 99213 OFFICE O/P EST LOW 20 MIN: CPT | Mod: 25,S$GLB,, | Performed by: FAMILY MEDICINE

## 2019-02-12 RX ORDER — DEXAMETHASONE SODIUM PHOSPHATE 4 MG/ML
8 INJECTION, SOLUTION INTRA-ARTICULAR; INTRALESIONAL; INTRAMUSCULAR; INTRAVENOUS; SOFT TISSUE ONCE
Status: COMPLETED | OUTPATIENT
Start: 2019-02-12 | End: 2019-02-12

## 2019-02-12 RX ADMIN — DEXAMETHASONE SODIUM PHOSPHATE 8 MG: 4 INJECTION, SOLUTION INTRA-ARTICULAR; INTRALESIONAL; INTRAMUSCULAR; INTRAVENOUS; SOFT TISSUE at 10:02

## 2019-02-12 NOTE — PROGRESS NOTES
The patient presents today co mod sev pain rt hand w hx CTS. Also has good rsp w neuropathy w lyrica but experiences mod dizziness. Also since fall w contusion to rt buttock had sciatic radiation    Past Medical History:  Past Medical History:   Diagnosis Date    Allergy     Arthritis     Asthma     GERD (gastroesophageal reflux disease)     HEARING LOSS     Hypertension     Lung disease     Pneumonia      Past Surgical History:   Procedure Laterality Date    CAROTID STENT       Review of patient's allergies indicates:   Allergen Reactions    Montelukast Other (See Comments)     Weakness and disorientation      Current Outpatient Medications on File Prior to Visit   Medication Sig Dispense Refill    albuterol (VENTOLIN HFA) 90 mcg/actuation inhaler Inhale 2 puffs into the lungs.      atorvastatin (LIPITOR) 20 MG tablet TAKE 1 TABLET BY MOUTH ONCE DAILY 30 tablet 12    benazepril (LOTENSIN) 10 MG tablet Take 1 tablet (10 mg total) by mouth once daily. 90 tablet 3    clopidogrel (PLAVIX) 75 mg tablet TAKE ONE TABLET BY MOUTH ONCE DAILY 30 tablet 11    fluticasone-salmeterol 250-50 mcg/dose (ADVAIR) 250-50 mcg/dose diskus inhaler Inhale 1 puff into the lungs 2 (two) times daily. 60 each 5    gabapentin (NEURONTIN) 300 MG capsule TAKE THREE CAPSULES BY MOUTH EVERY EVENING 90 capsule 12    ipratropium (ATROVENT HFA) 17 mcg/actuation inhaler Inhale 2 puffs into the lungs every 6 (six) hours as needed for Wheezing. 12.9 g 11    levoFLOXacin (LEVAQUIN) 750 MG tablet Take 750 mg by mouth once daily.      omeprazole (PRILOSEC) 20 MG capsule Take 1 capsule (20 mg total) by mouth every evening. 90 capsule 4    predniSONE (DELTASONE) 20 MG tablet TAKE 1 TABLET BY MOUTH ONCE DAILY FOR TEN DAYS  0    pregabalin (LYRICA) 75 MG capsule Take 1 capsule (75 mg total) by mouth 2 (two) times daily. 60 capsule 6    SENNOSIDES (SENOKOT ORAL) Take by mouth as needed.      traMADol (ULTRAM) 50 mg tablet TAKE 1 TABLET  BY MOUTH EVERY 6 HOURS AS NEEDED 120 tablet 0    triamcinolone acetonide 0.1% (KENALOG) 0.1 % cream Apply topically 2 (two) times daily. for 10 days 45 g 0     No current facility-administered medications on file prior to visit.      Social History     Socioeconomic History    Marital status:      Spouse name: Not on file    Number of children: Not on file    Years of education: Not on file    Highest education level: Not on file   Social Needs    Financial resource strain: Not on file    Food insecurity - worry: Not on file    Food insecurity - inability: Not on file    Transportation needs - medical: Not on file    Transportation needs - non-medical: Not on file   Occupational History    Not on file   Tobacco Use    Smoking status: Former Smoker     Packs/day: 1.00     Years: 40.00     Pack years: 40.00     Last attempt to quit: 5/3/1984     Years since quittin.8    Smokeless tobacco: Never Used   Substance and Sexual Activity    Alcohol use: No    Drug use: No    Sexual activity: Not on file   Other Topics Concern    Not on file   Social History Narrative    Not on file     Family History   Problem Relation Age of Onset    Cancer Mother     Heart disease Father     Breast cancer Sister     Breast cancer Maternal Aunt     Breast cancer Maternal Grandmother     Breast cancer Cousin          ROS:GENERAL: No fever, chills, fatigability or weight loss.  SKIN: No rashes, itching or changes in color or texture of skin.  HEAD: No headaches or recent head trauma.EYES: Visual acuity fine. No photophobia, ocular pain or diplopia.EARS: Denies ear pain, discharge or vertigo.NOSE: No loss of smell, no epistaxis or postnasal drip.MOUTH & THROAT: No hoarseness or change in voice. No excessive gum bleeding.NODES: Denies swollen glands.  CHEST: Denies GERARDO, cyanosis, wheezing, cough and sputum production.  CARDIOVASCULAR: Denies chest pain, PND, orthopnea or reduced exercise tolerance.  ABDOMEN:  Appetite fine. No weight loss. Denies diarrhea, abdominal pain, hematemesis or blood in stool.  URINARY: No flank pain, dysuria or hematuria.  PERIPHERAL VASCULAR: No claudication or cyanosis.  MUSCULOSKELETAL: See above.  NEUROLOGIC: No history of seizures, paralysis, alteration of gait or coordination.  PE:   HEAD: Normocephalic, atraumatic.EYES: PERRL. EOMI.   EARS: TM's intact. Light reflex normal. No retraction or perforation.   NOSE: Mucosa pink. Airway clear.MOUTH & THROAT: No tonsillar enlargement. No pharyngeal erythema or exudate. No stridor.  NODES: No cervical, axillary or inguinal lymph node enlargement.  CHEST: Lungs clear to auscultation.  CARDIOVASCULAR: Normal S1, S2. No rubs, murmurs or gallops.  ABDOMEN: Bowel sounds normal. Not distended. Soft. No tenderness or masses.  MUSCULOSKELETAL:Mod gen degen changes, tender rt knee and SI   NEUROLOGIC: Cranial Nerves: II-XII grossly intact.  Motor: 5/5 strength major flexors/extensors.  DTR's: Knees, Ankles 2+ and equal bilaterally; downgoing toes.  Sensory: Intact to light touch distally.  Gait & Posture: Normal gait and fine motion. No cerebellar signs.     Impression: Dizziness prob 2nd lyrica  Knee arthropathy  Sciatica  CTS  Plan: Red lyrica 75 from bid to qd if intol will reduce to 25 mg  Ortho consult

## 2019-03-07 ENCOUNTER — TELEPHONE (OUTPATIENT)
Dept: FAMILY MEDICINE | Facility: CLINIC | Age: 84
End: 2019-03-07

## 2019-03-07 NOTE — TELEPHONE ENCOUNTER
----- Message from Jaimee Yanez sent at 3/7/2019 12:55 PM CST -----  Contact: pt  She's calling in regards to speak with nurse marisa   Pt stated dr mckenzie knows her issues already        pls call pt back at [phone] (home)

## 2019-03-07 NOTE — TELEPHONE ENCOUNTER
Pt informed that Dr. Carlos is out of the office. Pt states that she is still having complains of pain in her leg. Informed pt that she would have to be seen if she is need something else for her pain. Offered pt the next available with a NP. Pt states that she needs to see Dr. Carlos on Monday. Pt states she will call back and speak to Zainab

## 2019-03-11 ENCOUNTER — TELEPHONE (OUTPATIENT)
Dept: FAMILY MEDICINE | Facility: CLINIC | Age: 84
End: 2019-03-11

## 2019-03-11 DIAGNOSIS — M79.605 PAIN OF LEFT LOWER EXTREMITY: Primary | ICD-10-CM

## 2019-03-11 NOTE — TELEPHONE ENCOUNTER
----- Message from Maliha Goncalves sent at 3/11/2019 10:51 AM CDT -----  Contact: self/[phone]  Would like to consult with nurse regarding leg pain, please call back at [phone]. Thanks/ar

## 2019-03-11 NOTE — TELEPHONE ENCOUNTER
Patient states she did go to Lyrica 75 mg once daily, didn't do any good, so she went back to BID and tolerating fine, she has Appt Wed with Ortho here in [place].

## 2019-03-11 NOTE — TELEPHONE ENCOUNTER
Plan: Red lyrica 75 from bid to qd if intol will reduce to 25 mg  Ortho consult     appt needs to be booked

## 2019-03-13 ENCOUNTER — HOSPITAL ENCOUNTER (OUTPATIENT)
Dept: RADIOLOGY | Facility: HOSPITAL | Age: 84
Discharge: HOME | End: 2019-03-13
Attending: FAMILY MEDICINE
Payer: MEDICARE

## 2019-03-13 ENCOUNTER — OFFICE VISIT (OUTPATIENT)
Dept: ORTHOPEDICS | Facility: CLINIC | Age: 84
End: 2019-03-13
Payer: MEDICARE

## 2019-03-13 ENCOUNTER — HOSPITAL ENCOUNTER (OUTPATIENT)
Dept: RADIOLOGY | Facility: HOSPITAL | Age: 84
Discharge: HOME | End: 2019-03-13
Attending: NURSE PRACTITIONER
Payer: MEDICARE

## 2019-03-13 ENCOUNTER — TELEPHONE (OUTPATIENT)
Dept: FAMILY MEDICINE | Facility: CLINIC | Age: 84
End: 2019-03-13

## 2019-03-13 VITALS
HEIGHT: 64 IN | WEIGHT: 121 LBS | BODY MASS INDEX: 20.67 KG/M2 | WEIGHT: 121.06 LBS | HEIGHT: 64 IN | BODY MASS INDEX: 20.66 KG/M2

## 2019-03-13 DIAGNOSIS — Z12.31 SCREENING MAMMOGRAM, ENCOUNTER FOR: ICD-10-CM

## 2019-03-13 DIAGNOSIS — Z12.31 SCREENING MAMMOGRAM, ENCOUNTER FOR: Primary | ICD-10-CM

## 2019-03-13 DIAGNOSIS — M25.561 RIGHT KNEE PAIN, UNSPECIFIED CHRONICITY: Primary | ICD-10-CM

## 2019-03-13 DIAGNOSIS — M25.561 RIGHT KNEE PAIN, UNSPECIFIED CHRONICITY: ICD-10-CM

## 2019-03-13 PROCEDURE — 73562 X-RAY EXAM OF KNEE 3: CPT | Mod: TC,PO,RT

## 2019-03-13 PROCEDURE — 20610 DRAIN/INJ JOINT/BURSA W/O US: CPT | Mod: RT,S$GLB,, | Performed by: NURSE PRACTITIONER

## 2019-03-13 PROCEDURE — 77067 MAMMO DIGITAL SCREENING BILAT WITH TOMOSYNTHESIS_CAD: ICD-10-PCS | Mod: 26,,, | Performed by: RADIOLOGY

## 2019-03-13 PROCEDURE — 20610 LARGE JOINT ASPIRATION/INJECTION: R KNEE: ICD-10-PCS | Mod: RT,S$GLB,, | Performed by: NURSE PRACTITIONER

## 2019-03-13 PROCEDURE — 99999 PR PBB SHADOW E&M-EST. PATIENT-LVL II: CPT | Mod: PBBFAC,,, | Performed by: NURSE PRACTITIONER

## 2019-03-13 PROCEDURE — 99999 PR PBB SHADOW E&M-EST. PATIENT-LVL II: ICD-10-PCS | Mod: PBBFAC,,, | Performed by: NURSE PRACTITIONER

## 2019-03-13 PROCEDURE — 77067 SCR MAMMO BI INCL CAD: CPT | Mod: 26,,, | Performed by: RADIOLOGY

## 2019-03-13 PROCEDURE — 99203 OFFICE O/P NEW LOW 30 MIN: CPT | Mod: 25,S$GLB,, | Performed by: NURSE PRACTITIONER

## 2019-03-13 PROCEDURE — 77067 SCR MAMMO BI INCL CAD: CPT | Mod: TC,PO

## 2019-03-13 PROCEDURE — 77063 BREAST TOMOSYNTHESIS BI: CPT | Mod: 26,,, | Performed by: RADIOLOGY

## 2019-03-13 PROCEDURE — 73564 XR KNEE ORTHO RIGHT WITH FLEXION: ICD-10-PCS | Mod: 26,RT,, | Performed by: RADIOLOGY

## 2019-03-13 PROCEDURE — 73562 X-RAY EXAM OF KNEE 3: CPT | Mod: 26,59,RT, | Performed by: RADIOLOGY

## 2019-03-13 PROCEDURE — 73562 XR KNEE ORTHO RIGHT WITH FLEXION: ICD-10-PCS | Mod: 26,59,RT, | Performed by: RADIOLOGY

## 2019-03-13 PROCEDURE — 77063 MAMMO DIGITAL SCREENING BILAT WITH TOMOSYNTHESIS_CAD: ICD-10-PCS | Mod: 26,,, | Performed by: RADIOLOGY

## 2019-03-13 PROCEDURE — 73564 X-RAY EXAM KNEE 4 OR MORE: CPT | Mod: 26,RT,, | Performed by: RADIOLOGY

## 2019-03-13 PROCEDURE — 99203 PR OFFICE/OUTPT VISIT, NEW, LEVL III, 30-44 MIN: ICD-10-PCS | Mod: 25,S$GLB,, | Performed by: NURSE PRACTITIONER

## 2019-03-13 RX ORDER — TRIAMCINOLONE ACETONIDE 40 MG/ML
40 INJECTION, SUSPENSION INTRA-ARTICULAR; INTRAMUSCULAR
Status: DISCONTINUED | OUTPATIENT
Start: 2019-03-13 | End: 2019-03-13 | Stop reason: HOSPADM

## 2019-03-13 RX ADMIN — TRIAMCINOLONE ACETONIDE 40 MG: 40 INJECTION, SUSPENSION INTRA-ARTICULAR; INTRAMUSCULAR at 03:03

## 2019-03-13 NOTE — TELEPHONE ENCOUNTER
----- Message from Anderson Noriega sent at 3/13/2019  2:09 PM CDT -----  Contact: in lobby  Pt is waiting in the lobby to see Krysten Parker, she would like to have a mammo done while she's here.

## 2019-03-13 NOTE — PROGRESS NOTES
DATE: 3/13/2019  PATIENT: Tamie Fink  REFERRING MD:   CHIEF COMPLAINT:   Chief Complaint   Patient presents with    Right Knee - Pain       HISTORY:  Tamie Fink is a 88 y.o. female  who presents for initial evaluation of her right knee pain. She is a new patient to me referred by Dr Carlos for orthopedic evaluation.  Her pain rating is 6/10 today.  She complains of swelling of the knee and shooting pain radiating up her thigh and down to her foot.  She reports pain with bending her knee.  She uses a compression sleeve occasionally.  She has moderate relief with over the counter topical lidocaine cream.  She has been diagnosed for Sciatic nerve pain in the past.  She reports she does hav low back pain.  She has had back ANUPAMA injections in the past but it has been awhile.  She is a  and takes care of her son who is disabled and lives with her.       PAST MEDICAL/SURGICAL HISTORY:  Past Medical History:   Diagnosis Date    Allergy     Arthritis     Asthma     GERD (gastroesophageal reflux disease)     HEARING LOSS     Hypertension     Lung disease     Pneumonia      Past Surgical History:   Procedure Laterality Date    CAROTID STENT         Current Medications:   Current Outpatient Medications:     albuterol (VENTOLIN HFA) 90 mcg/actuation inhaler, Inhale 2 puffs into the lungs., Disp: , Rfl:     atorvastatin (LIPITOR) 20 MG tablet, TAKE 1 TABLET BY MOUTH ONCE DAILY, Disp: 30 tablet, Rfl: 12    benazepril (LOTENSIN) 10 MG tablet, Take 1 tablet (10 mg total) by mouth once daily., Disp: 90 tablet, Rfl: 3    clopidogrel (PLAVIX) 75 mg tablet, TAKE ONE TABLET BY MOUTH ONCE DAILY, Disp: 30 tablet, Rfl: 11    fluticasone-salmeterol 250-50 mcg/dose (ADVAIR) 250-50 mcg/dose diskus inhaler, Inhale 1 puff into the lungs 2 (two) times daily., Disp: 60 each, Rfl: 5    omeprazole (PRILOSEC) 20 MG capsule, Take 1 capsule (20 mg total) by mouth every evening., Disp: 90 capsule, Rfl: 4    pregabalin  (LYRICA) 75 MG capsule, Take 1 capsule (75 mg total) by mouth 2 (two) times daily., Disp: 60 capsule, Rfl: 6    SENNOSIDES (SENOKOT ORAL), Take by mouth as needed., Disp: , Rfl:     traMADol (ULTRAM) 50 mg tablet, TAKE 1 TABLET BY MOUTH EVERY 6 HOURS AS NEEDED, Disp: 120 tablet, Rfl: 0    ipratropium (ATROVENT HFA) 17 mcg/actuation inhaler, Inhale 2 puffs into the lungs every 6 (six) hours as needed for Wheezing., Disp: 12.9 g, Rfl: 11    Family History: family history was reviewed and is noncontributory  Social History:   Social History     Socioeconomic History    Marital status:      Spouse name: Not on file    Number of children: Not on file    Years of education: Not on file    Highest education level: Not on file   Social Needs    Financial resource strain: Not on file    Food insecurity - worry: Not on file    Food insecurity - inability: Not on file    Transportation needs - medical: Not on file    Transportation needs - non-medical: Not on file   Occupational History    Not on file   Tobacco Use    Smoking status: Former Smoker     Packs/day: 1.00     Years: 40.00     Pack years: 40.00     Last attempt to quit: 5/3/1984     Years since quittin.8    Smokeless tobacco: Never Used   Substance and Sexual Activity    Alcohol use: No    Drug use: No    Sexual activity: Not on file   Other Topics Concern    Not on file   Social History Narrative    Not on file       ROS:  Constitution: Negative for chills, fever, and sweats. Negative for unexplained weight loss.  Eyes: no redness, no discharge  Ears: no ear pain or tinnitus  Cardiovascular: Negative for chest pain, irregular heartbeat, leg swelling and palpitations.   Respiratory: Negative for cough and shortness of breath.   Gastrointestinal: Negative for abdominal pain, nausea and vomiting.   Genitourinary: Negative for bladder incontinence and dysuria.   Neurological: Negative for numbness.   Psychiatric/Behavioral: Negative for  "behavior changes.   Endocrine: Negative for palpitations.   Hematologic/Lymphatic: Negative for bleeding disorders.  Skin: Negative for pruritis or rash.       PHYSICAL EXAM:  Ht 5' 4" (1.626 m)   Wt 54.9 kg (121 lb)   BMI 20.77 kg/m²   Tamie Fink is a well developed, well nourished female in no acute distress. Physical examination of the right knee evaluated the following:    Gait and Alignment  Inspection for ecchymosis, swelling, atrophy, or deformity  Inspection for intra-articular and/or bursal effusions  Tenderness to palpation over the bony and soft tissue structures around the knee  Range of Motion and presence of extensor lag/contractures  Sensation and motor strength  Varus/valgus or anterior/posterior/rotatory instability  Flexion pinch and Wilfrid's Tests  Patellar alignment/tracking/pain to palpation  Vascular exam to include skin temperature/color/capillary refill    Remarkable findings included:  Mild edema, +effusion, no ecchymosis or obvious deformity  tender to palpation about the joint line  ROM 0-90  Strength 5/5  No gross instability  Sensation intact  Skin warm, dry, intact      IMAGING:   X-ray obtained Right knee performed today personally reviewed with patient. Radiologist report as follows:   There is a small joint effusion on the right.  No acute fractures or dislocations visualized.  There is tricompartmental osteoarthritis on the right with moderate to severe joint space loss in the lateral tibiofemoral compartment.  Small tibiofemoral marginal osteophytes and mild joint space narrowing noted involving the left knee.Visualized osseous structures appear diffusely osteopenic.    ASSESSMENT:   Right knee osteoarthritis  Right sciatic pain    PLAN:  The nature of the diagnosis, using models and diagrams when appropriate, was explained to the patient in detail. I have explained to Mrs Fink that her knee does seem swollen and painful today and I believe she would benefit from a " cortisone injection.  However, she may also have sciatic pain.  Treatment option discussed included non-operative measures of custom orthotic, rest,  modification of activities, application of ice, elevation of extremity, compression, over the counter pain/antiinflammatory relief, physica/occupational therapy and cortisone injection.  Right knee cortisone injection performed today (see procedure documentation).  I have instructed to monitor injection site for signs and symptoms of inflammation/infection.  I have instructed to elevate and apply ice to knees this evening.  I have written her a prescription for a cane.  I have instructed her to follow up with her back doctor for possible repeat ANUPAMA if no relief from the pain.

## 2019-03-13 NOTE — LETTER
March 13, 2019      Amador Carlos MD  [address]  [place] [address]           [facility]  [address] [facility] [address]  Phone: [phone]          Patient: Tamie Fink   MR Number: [id number]   YOB: 1931   Date of Visit: 3/13/2019       Dear Dr. Amador Carlos:    Thank you for referring Tamie Fink to me for evaluation. Attached you will find relevant portions of my assessment and plan of care.    If you have questions, please do not hesitate to call me. I look forward to following Tamie Fink along with you.    Sincerely,    Krysten Parker, APRN    Enclosure  CC:  No Recipients    If you would like to receive this communication electronically, please contact [email or web address] or [phone] to request more information on [email or web address] Link access.    For providers and/or their staff who would like to refer a patient to [facility], please contact us through our one-stop-shop provider referral line, [facility], at [phone].    If you feel you have received this communication in error or would no longer like to receive these types of communications, please e-mail [email or web address]

## 2019-03-13 NOTE — PROCEDURES
Large Joint Aspiration/Injection: R knee  Date/Time: 3/13/2019 3:41 PM  Performed by: ABDULKADIR Cates  Authorized by: ABDULKADIR Cates     Consent Done?:  Yes (Verbal)  Indications:  Pain  Timeout: Prior to procedure the correct patient, procedure, and site was verified      Location:  Knee  Site:  R knee  Prep: Patient was prepped and draped in usual sterile fashion    Ultrasonic Guidance for needle placement: No  Needle size:  22 G  Approach:  Anterolateral  Medications:  40 mg triamcinolone acetonide 40 mg/mL  Patient tolerance:  Patient tolerated the procedure well with no immediate complications

## 2019-03-27 RX ORDER — TRAMADOL HYDROCHLORIDE 50 MG/1
TABLET ORAL
Qty: 120 TABLET | Refills: 0 | Status: SHIPPED | OUTPATIENT
Start: 2019-03-27 | End: 2019-07-01 | Stop reason: SDUPTHER

## 2019-04-04 ENCOUNTER — TELEPHONE (OUTPATIENT)
Dept: FAMILY MEDICINE | Facility: CLINIC | Age: 84
End: 2019-04-04

## 2019-04-04 RX ORDER — METHYLPREDNISOLONE 4 MG/1
TABLET ORAL
Qty: 1 PACKAGE | Refills: 0 | Status: SHIPPED | OUTPATIENT
Start: 2019-04-04 | End: 2019-09-05

## 2019-04-04 NOTE — TELEPHONE ENCOUNTER
rec medrol dspk-sent-the shot sometimes will give an initial effect and in a few days it might do more. If the dosepak doesn't get things better by Monday I would incr lyrica 75 to 100 bid

## 2019-04-04 NOTE — TELEPHONE ENCOUNTER
Patient states that she went to ortho consult, was given shot of cortisone which helped for a short time, ortho said it was sciatica, now the pain is back, patient states it's so bad she can't drive, hardley walks, cries from pain, any suggestions.

## 2019-04-04 NOTE — TELEPHONE ENCOUNTER
----- Message from Rosemary Steinberg sent at 4/4/2019 11:15 AM CDT -----  Contact: Pt  Type:  Needs Medical Advice    Who Called: Pt  Symptoms (please be specific):  knee, leg pain   How long has patient had these symptoms:  Last few months  Pharmacy name and phone #:  n/a  Would the patient rather a call back or a response via [facility]? Call Back  Best Call Back Number:  [phone] (home)   Additional Information: The pt is requesting to speak with the nurse regarding her severe pain as soon as possible.

## 2019-06-14 ENCOUNTER — TELEPHONE (OUTPATIENT)
Dept: FAMILY MEDICINE | Facility: CLINIC | Age: 84
End: 2019-06-14

## 2019-06-14 DIAGNOSIS — M79.669 PAIN OF KNEE AND LOWER LEG, UNSPECIFIED LATERALITY: Primary | ICD-10-CM

## 2019-06-14 DIAGNOSIS — M25.569 PAIN OF KNEE AND LOWER LEG, UNSPECIFIED LATERALITY: Primary | ICD-10-CM

## 2019-06-14 NOTE — TELEPHONE ENCOUNTER
----- Message from Cortney Clive sent at 6/14/2019  3:47 PM CDT -----  Contact: Patient   Patient would like a call back at [phone], Regards to a question that she has about her leg and knee pain.    Thanks  Td

## 2019-06-14 NOTE — TELEPHONE ENCOUNTER
Spoke with patient, patient states her leg is hurting again.  She had seen ortho (Krysten Parker) and she had given her a shot it lasted 5 weeks.  Wants to know if you can do the same or can she see SUSIE Parker again, please advise.  Patient aware of no response til Monday

## 2019-06-17 ENCOUNTER — TELEPHONE (OUTPATIENT)
Dept: FAMILY MEDICINE | Facility: CLINIC | Age: 84
End: 2019-06-17

## 2019-06-17 DIAGNOSIS — M25.569 KNEE PAIN, UNSPECIFIED CHRONICITY, UNSPECIFIED LATERALITY: Primary | ICD-10-CM

## 2019-06-17 NOTE — TELEPHONE ENCOUNTER
----- Message from Candice Ponce sent at 6/17/2019 10:26 AM CDT -----  Patient needs call back, will elaborate(caller said doctor knows what its regarding)..[phone] (home)

## 2019-06-17 NOTE — TELEPHONE ENCOUNTER
Pt would like to have another cortisone injection in her knee, please sign order for ortho referral

## 2019-06-18 NOTE — PROGRESS NOTES
DATE: 6/18/2019  PATIENT: Tamie Fink  REFERRING MD:   CHIEF COMPLAINT:   Chief Complaint   Patient presents with    Right Knee - Pain       HISTORY:  Tamie Fink is a 88 y.o. female  who presents for follow up evaluation of her right knee pain.  I saw her 03/13/19 and diagnosed her with osteoarthritis. I performed a cortisone injection and she reports she had moderate pain relief until about 10 days ago.  She presents today requesting repeat cortisone injection.  Her pain rating is 3/10 today and increases with walking.  She complains of swelling of the knee and shooting pain radiating up her thigh and down to her foot.  She reports pain with bending her knee.  She uses a compression sleeve occasionally.  She has moderate relief with over the counter topical lidocaine cream.  She has been diagnosed for Sciatic nerve pain in the past.  She reports she does have low back pain.  She has had back ANUPAMA injections in the past but it has been awhile.  She is a  and takes care of her son who is disabled and lives with her. Patient's daughter is in attendance today and participating in the history portion of the exam.        PAST MEDICAL/SURGICAL HISTORY:  Past Medical History:   Diagnosis Date    Allergy     Arthritis     Asthma     GERD (gastroesophageal reflux disease)     HEARING LOSS     Hypertension     Lung disease     Pneumonia      Past Surgical History:   Procedure Laterality Date    CAROTID STENT         Current Medications:   Current Outpatient Medications:     albuterol (VENTOLIN HFA) 90 mcg/actuation inhaler, Inhale 2 puffs into the lungs., Disp: , Rfl:     atorvastatin (LIPITOR) 20 MG tablet, TAKE 1 TABLET BY MOUTH ONCE DAILY, Disp: 30 tablet, Rfl: 12    benazepril (LOTENSIN) 10 MG tablet, Take 1 tablet (10 mg total) by mouth once daily., Disp: 90 tablet, Rfl: 3    clopidogrel (PLAVIX) 75 mg tablet, TAKE ONE TABLET BY MOUTH ONCE DAILY, Disp: 30 tablet, Rfl: 11     fluticasone-salmeterol 250-50 mcg/dose (ADVAIR) 250-50 mcg/dose diskus inhaler, Inhale 1 puff into the lungs 2 (two) times daily., Disp: 60 each, Rfl: 5    ipratropium (ATROVENT HFA) 17 mcg/actuation inhaler, Inhale 2 puffs into the lungs every 6 (six) hours as needed for Wheezing., Disp: 12.9 g, Rfl: 11    methylPREDNISolone (MEDROL DOSEPACK) 4 mg tablet, As directed, Disp: 1 Package, Rfl: 0    omeprazole (PRILOSEC) 20 MG capsule, Take 1 capsule (20 mg total) by mouth every evening., Disp: 90 capsule, Rfl: 4    pregabalin (LYRICA) 75 MG capsule, Take 1 capsule (75 mg total) by mouth 2 (two) times daily., Disp: 60 capsule, Rfl: 6    SENNOSIDES (SENOKOT ORAL), Take by mouth as needed., Disp: , Rfl:     traMADol (ULTRAM) 50 mg tablet, TAKE 1 TABLET BY MOUTH EVERY 6 HOURS AS NEEDED, Disp: 120 tablet, Rfl: 0    Family History: family history was reviewed and is noncontributory  Social History:   Social History     Socioeconomic History    Marital status:      Spouse name: Not on file    Number of children: Not on file    Years of education: Not on file    Highest education level: Not on file   Occupational History    Not on file   Social Needs    Financial resource strain: Not on file    Food insecurity:     Worry: Not on file     Inability: Not on file    Transportation needs:     Medical: Not on file     Non-medical: Not on file   Tobacco Use    Smoking status: Former Smoker     Packs/day: 1.00     Years: 40.00     Pack years: 40.00     Last attempt to quit: 5/3/1984     Years since quittin.1    Smokeless tobacco: Never Used   Substance and Sexual Activity    Alcohol use: No    Drug use: No    Sexual activity: Not on file   Lifestyle    Physical activity:     Days per week: Not on file     Minutes per session: Not on file    Stress: Not on file   Relationships    Social connections:     Talks on phone: Not on file     Gets together: Not on file     Attends [employer] service: Not on  file     Active member of club or organization: Not on file     Attends meetings of clubs or organizations: Not on file     Relationship status: Not on file   Other Topics Concern    Not on file   Social History Narrative    Not on file       ROS:  Constitution: Negative for chills, fever, and sweats. Negative for unexplained weight loss.  Eyes: no redness, no discharge  Ears: no ear pain or tinnitus  Cardiovascular: Negative for chest pain, irregular heartbeat, leg swelling and palpitations.   Respiratory: Negative for cough and shortness of breath.   Gastrointestinal: Negative for abdominal pain, nausea and vomiting.   Genitourinary: Negative for bladder incontinence and dysuria.   Neurological: Negative for numbness.   Psychiatric/Behavioral: Negative for behavior changes.   Endocrine: Negative for palpitations.   Hematologic/Lymphatic: Negative for bleeding disorders.  Skin: Negative for pruritis or rash.       PHYSICAL EXAM:  There were no vitals taken for this visit.  Tamie Fink is a well developed, well nourished female in no acute distress. Physical examination of the right knee evaluated the following:    Gait and Alignment  Inspection for ecchymosis, swelling, atrophy, or deformity  Inspection for intra-articular and/or bursal effusions  Tenderness to palpation over the bony and soft tissue structures around the knee  Range of Motion and presence of extensor lag/contractures  Sensation and motor strength  Varus/valgus or anterior/posterior/rotatory instability  Flexion pinch and Wilfrid's Tests  Patellar alignment/tracking/pain to palpation  Vascular exam to include skin temperature/color/capillary refill    Remarkable findings included:  No edema, no effusion, no ecchymosis or obvious deformity  tender to palpation about the joint line  ROM 0-110  Strength 5/5  No gross instability  Sensation intact  Skin warm, dry, intact      IMAGING:   X-ray obtained Right knee performed 03/13/19 personally  reviewed with patient. Radiologist report as follows:   There is a small joint effusion on the right.  No acute fractures or dislocations visualized.  There is tricompartmental osteoarthritis on the right with moderate to severe joint space loss in the lateral tibiofemoral compartment.  Small tibiofemoral marginal osteophytes and mild joint space narrowing noted involving the left knee.Visualized osseous structures appear diffusely osteopenic.    ASSESSMENT:   Right knee osteoarthritis  Right sciatic pain    PLAN:  The nature of the diagnosis, using models and diagrams when appropriate, was explained to the patient in detail. I have explained to Mrs Fink that her knee does seem swollen and painful today and I believe she would benefit from a cortisone injection.  However, she may also have sciatic pain.  Treatment option discussed included non-operative measures of custom orthotic, rest,  modification of activities, application of ice, elevation of extremity, compression, over the counter pain/antiinflammatory relief, physica/occupational therapy and cortisone injection.  Right knee cortisone injection performed today (see procedure documentation).  I have instructed to monitor injection site for signs and symptoms of inflammation/infection.  I have instructed to elevate and apply ice to knees this evening.  I have scheduled her to return for Synvisc-one injection pending insurance approval.

## 2019-06-19 ENCOUNTER — OFFICE VISIT (OUTPATIENT)
Dept: ORTHOPEDICS | Facility: CLINIC | Age: 84
End: 2019-06-19
Payer: MEDICARE

## 2019-06-19 DIAGNOSIS — M17.11 PRIMARY OSTEOARTHRITIS OF RIGHT KNEE: Primary | ICD-10-CM

## 2019-06-19 PROCEDURE — 99999 PR PBB SHADOW E&M-EST. PATIENT-LVL II: CPT | Mod: PBBFAC,,, | Performed by: NURSE PRACTITIONER

## 2019-06-19 PROCEDURE — 99213 PR OFFICE/OUTPT VISIT, EST, LEVL III, 20-29 MIN: ICD-10-PCS | Mod: 25,S$GLB,, | Performed by: NURSE PRACTITIONER

## 2019-06-19 PROCEDURE — 20610 LARGE JOINT ASPIRATION/INJECTION: R KNEE: ICD-10-PCS | Mod: RT,S$GLB,, | Performed by: NURSE PRACTITIONER

## 2019-06-19 PROCEDURE — 20610 DRAIN/INJ JOINT/BURSA W/O US: CPT | Mod: RT,S$GLB,, | Performed by: NURSE PRACTITIONER

## 2019-06-19 PROCEDURE — 99213 OFFICE O/P EST LOW 20 MIN: CPT | Mod: 25,S$GLB,, | Performed by: NURSE PRACTITIONER

## 2019-06-19 PROCEDURE — 99999 PR PBB SHADOW E&M-EST. PATIENT-LVL II: ICD-10-PCS | Mod: PBBFAC,,, | Performed by: NURSE PRACTITIONER

## 2019-06-19 RX ORDER — TRIAMCINOLONE ACETONIDE 40 MG/ML
40 INJECTION, SUSPENSION INTRA-ARTICULAR; INTRAMUSCULAR
Status: DISCONTINUED | OUTPATIENT
Start: 2019-06-19 | End: 2019-06-19 | Stop reason: HOSPADM

## 2019-06-19 RX ADMIN — TRIAMCINOLONE ACETONIDE 40 MG: 40 INJECTION, SUSPENSION INTRA-ARTICULAR; INTRAMUSCULAR at 02:06

## 2019-06-19 NOTE — LETTER
June 19, 2019      Amador Carlos MD  [address]  [place] [address]           [facility]  [address] [facility] [address]  Phone: [phone]          Patient: Tamie Fink   MR Number: [id number]   YOB: 1931   Date of Visit: 6/19/2019       Dear Dr. Amador Carlos:    Thank you for referring Tamie Fink to me for evaluation. Attached you will find relevant portions of my assessment and plan of care.    If you have questions, please do not hesitate to call me. I look forward to following Tamie Fink along with you.    Sincerely,    Krysten Parker, APRN    Enclosure  CC:  No Recipients    If you would like to receive this communication electronically, please contact [email or web address] or [phone] to request more information on [employer] Link access.    For providers and/or their staff who would like to refer a patient to [facility], please contact us through our one-stop-shop provider referral line, [facility], at [phone].    If you feel you have received this communication in error or would no longer like to receive these types of communications, please e-mail [email or web address]

## 2019-06-19 NOTE — PROCEDURES
Large Joint Aspiration/Injection: R knee  Date/Time: 6/19/2019 2:20 PM  Performed by: ABDULKADIR Cates  Authorized by: ABDULKADIR Cates     Consent Done?:  Yes (Verbal)  Indications:  Pain  Timeout: Prior to procedure the correct patient, procedure, and site was verified      Location:  Knee  Site:  R knee  Prep: Patient was prepped and draped in usual sterile fashion    Ultrasonic Guidance for needle placement: No  Needle size:  22 G  Approach:  Anterolateral  Medications:  40 mg triamcinolone acetonide 40 mg/mL  Patient tolerance:  Patient tolerated the procedure well with no immediate complications

## 2019-06-26 RX ORDER — OMEPRAZOLE 20 MG/1
20 CAPSULE, DELAYED RELEASE ORAL NIGHTLY
Qty: 90 CAPSULE | Refills: 4 | Status: SHIPPED | OUTPATIENT
Start: 2019-06-26 | End: 2019-09-05 | Stop reason: SDUPTHER

## 2019-06-26 NOTE — TELEPHONE ENCOUNTER
----- Message from Glorianhan Puente sent at 6/26/2019  8:38 AM CDT -----  Contact: pt   Type:  RX Refill Request    Who Called: pt   Refill or New Rx: refill   RX Name and Strength: omeprazole (PRILOSEC) 20 MG capsule  How is the patient currently taking it? (ex. 1XDay): 1 a day   Is this a 30 day or 90 day RX: 90  RX Name and Strength: traMADol (ULTRAM) 50 mg tablet  How is the patient currently taking it? (ex. 1XDay): as needed  Is this a 30 day or 90 day RX: 30  Preferred [facility] phone [facility] [phone]  Local or Mail Order: local  Ordering Provider: Dr Carlos  Would the patient rather a call back or a response via [facility]? Call back  Best Call Back Number: [phone]   Additional Information:

## 2019-06-27 ENCOUNTER — TELEPHONE (OUTPATIENT)
Dept: FAMILY MEDICINE | Facility: CLINIC | Age: 84
End: 2019-06-27

## 2019-06-27 NOTE — TELEPHONE ENCOUNTER
----- Message from Jaimee Yanez sent at 6/27/2019 12:07 PM CDT -----  Contact: pt  She's calling in regards to speak with nurse concerning dentist    ,pls call pt back at [phone]

## 2019-06-27 NOTE — TELEPHONE ENCOUNTER
Spoke with [facility], asked for her to refax request for patient to stop Plavix for dental procedure.  Already spoke with patient, patient states she has stopped Plavix since Tuesday, but dentist needs our authorization.

## 2019-07-01 ENCOUNTER — TELEPHONE (OUTPATIENT)
Dept: FAMILY MEDICINE | Facility: CLINIC | Age: 84
End: 2019-07-01

## 2019-07-01 RX ORDER — PREGABALIN 75 MG/1
75 CAPSULE ORAL 2 TIMES DAILY
Qty: 60 CAPSULE | Refills: 5 | Status: SHIPPED | OUTPATIENT
Start: 2019-07-01 | End: 2020-01-02

## 2019-07-01 RX ORDER — TRAMADOL HYDROCHLORIDE 50 MG/1
50 TABLET ORAL EVERY 6 HOURS PRN
Qty: 120 TABLET | Refills: 0 | Status: SHIPPED | OUTPATIENT
Start: 2019-07-01 | End: 2019-08-29 | Stop reason: SDUPTHER

## 2019-07-01 RX ORDER — TRAMADOL HYDROCHLORIDE 50 MG/1
50 TABLET ORAL EVERY 6 HOURS PRN
Qty: 120 TABLET | Refills: 0 | Status: SHIPPED | OUTPATIENT
Start: 2019-07-01 | End: 2019-07-01 | Stop reason: SDUPTHER

## 2019-07-01 NOTE — TELEPHONE ENCOUNTER
Pharmacy contact to discontinue Lyrica and Tramadol prescriptions, spoke to [facility], [facility]

## 2019-07-01 NOTE — TELEPHONE ENCOUNTER
----- Message from Kyara Fernandez sent at 7/1/2019  4:11 PM CDT -----  Contact: Lauren;-son  Requesting a call back regarding refills. They state that they have changed pharmacies and would like the prescriptions sent over to the pharmacy listed below. If needed please call back at [phone]      [facility]    [facility]   [address]  [place]

## 2019-07-01 NOTE — TELEPHONE ENCOUNTER
----- Message from Cortney Duffy sent at 7/1/2019  9:30 AM CDT -----  Contact: Patient   Type:  RX Refill Request    Who Called: Patient   Refill or New Rx: Refill   RX Name and Strength: Tramadol hcl 50 mg  How is the patient currently taking it? (ex. 1XDay): As needed  Is this a 30 day or 90 day RX: n/a  [facility]   Ph: [phone]  Local or Mail Order: Local  Ordering Provider: Dr. Carlos  Would the patient rather a call back or a response via [facility]? Call back   Best Call Back Number: Please call her at [phone]  Additional Information: n/a

## 2019-07-09 ENCOUNTER — TELEPHONE (OUTPATIENT)
Dept: FAMILY MEDICINE | Facility: CLINIC | Age: 84
End: 2019-07-09

## 2019-07-09 ENCOUNTER — TELEPHONE (OUTPATIENT)
Dept: ORTHOPEDICS | Facility: CLINIC | Age: 84
End: 2019-07-09

## 2019-07-09 NOTE — TELEPHONE ENCOUNTER
----- Message from Carmen Patino sent at 7/9/2019  1:56 PM CDT -----  Contact: Patient  Type:  Needs Medical Advice    Who Called: Patient  Symptoms (please be specific):    How long has patient had these symptoms:    Pharmacy name and phone #:    Would the patient rather a call back or a response via [facility]? call  Best Call Back Number: [phone]  Additional Information: Patient is having a toot pulled tomorrow and needs to verify that its OK to be off of Plavix

## 2019-07-09 NOTE — TELEPHONE ENCOUNTER
Notified pt that Synvis One is still pending with PHN. Rescheduled appt to 7/17 at 1130 at the [place] location. Pt stated understanding.

## 2019-07-17 ENCOUNTER — OFFICE VISIT (OUTPATIENT)
Dept: ORTHOPEDICS | Facility: CLINIC | Age: 84
End: 2019-07-17
Payer: MEDICARE

## 2019-07-17 VITALS — HEIGHT: 64 IN | BODY MASS INDEX: 20.67 KG/M2 | WEIGHT: 121.06 LBS

## 2019-07-17 DIAGNOSIS — M25.561 CHRONIC PAIN OF RIGHT KNEE: ICD-10-CM

## 2019-07-17 DIAGNOSIS — M17.11 PRIMARY OSTEOARTHRITIS OF RIGHT KNEE: Primary | ICD-10-CM

## 2019-07-17 DIAGNOSIS — G89.29 CHRONIC PAIN OF RIGHT KNEE: ICD-10-CM

## 2019-07-17 PROCEDURE — 99499 UNLISTED E&M SERVICE: CPT | Mod: S$GLB,,, | Performed by: NURSE PRACTITIONER

## 2019-07-17 PROCEDURE — 99999 PR PBB SHADOW E&M-EST. PATIENT-LVL II: CPT | Mod: PBBFAC,,, | Performed by: NURSE PRACTITIONER

## 2019-07-17 PROCEDURE — 20610 LARGE JOINT ASPIRATION/INJECTION: R KNEE: ICD-10-PCS | Mod: RT,S$GLB,, | Performed by: NURSE PRACTITIONER

## 2019-07-17 PROCEDURE — 99499 NO LOS: ICD-10-PCS | Mod: S$GLB,,, | Performed by: NURSE PRACTITIONER

## 2019-07-17 PROCEDURE — 20610 DRAIN/INJ JOINT/BURSA W/O US: CPT | Mod: RT,S$GLB,, | Performed by: NURSE PRACTITIONER

## 2019-07-17 PROCEDURE — 99999 PR PBB SHADOW E&M-EST. PATIENT-LVL II: ICD-10-PCS | Mod: PBBFAC,,, | Performed by: NURSE PRACTITIONER

## 2019-07-17 RX ORDER — PREGABALIN 75 MG/1
75 CAPSULE ORAL
COMMUNITY
End: 2019-09-05

## 2019-07-17 NOTE — PROGRESS NOTES
DATE: 7/17/2019  PATIENT: Tamie Fink  REFERRING MD:   CHIEF COMPLAINT:   Chief Complaint   Patient presents with    Right Knee - Pain     synvisc 1       HISTORY:  Tamie Fink is a 88 y.o. female  who presents for follow up evaluation of her right knee pain.  I saw her 06/19/19 and performed a cortisone injection and she reports she had moderate pain relief.  She presents today for Synvisc-one injection.  She uses a compression sleeve occasionally.  She has moderate relief with over the counter topical lidocaine cream.  She is a  and takes care of her son who is disabled and lives with her.      PAST MEDICAL/SURGICAL HISTORY:  Past Medical History:   Diagnosis Date    Allergy     Arthritis     Asthma     GERD (gastroesophageal reflux disease)     HEARING LOSS     Hypertension     Lung disease     Pneumonia      Past Surgical History:   Procedure Laterality Date    CAROTID STENT         Current Medications:   Current Outpatient Medications:     albuterol (VENTOLIN HFA) 90 mcg/actuation inhaler, Inhale 2 puffs into the lungs., Disp: , Rfl:     atorvastatin (LIPITOR) 20 MG tablet, TAKE 1 TABLET BY MOUTH ONCE DAILY, Disp: 30 tablet, Rfl: 12    benazepril (LOTENSIN) 10 MG tablet, Take 1 tablet (10 mg total) by mouth once daily., Disp: 90 tablet, Rfl: 3    clopidogrel (PLAVIX) 75 mg tablet, TAKE ONE TABLET BY MOUTH ONCE DAILY, Disp: 30 tablet, Rfl: 11    fluticasone-salmeterol 250-50 mcg/dose (ADVAIR) 250-50 mcg/dose diskus inhaler, Inhale 1 puff into the lungs 2 (two) times daily., Disp: 60 each, Rfl: 5    methylPREDNISolone (MEDROL DOSEPACK) 4 mg tablet, As directed, Disp: 1 Package, Rfl: 0    omeprazole (PRILOSEC) 20 MG capsule, Take 1 capsule (20 mg total) by mouth every evening., Disp: 90 capsule, Rfl: 4    pregabalin (LYRICA) 75 MG capsule, Take 1 capsule (75 mg total) by mouth 2 (two) times daily., Disp: 60 capsule, Rfl: 5    pregabalin (LYRICA) 75 MG capsule, Take 75 mg by  mouth., Disp: , Rfl:     SENNOSIDES (SENOKOT ORAL), Take by mouth as needed., Disp: , Rfl:     traMADol (ULTRAM) 50 mg tablet, Take 1 tablet (50 mg total) by mouth every 6 (six) hours as needed., Disp: 120 tablet, Rfl: 0    ipratropium (ATROVENT HFA) 17 mcg/actuation inhaler, Inhale 2 puffs into the lungs every 6 (six) hours as needed for Wheezing., Disp: 12.9 g, Rfl: 11    Family History: family history was reviewed and is noncontributory  Social History:   Social History     Socioeconomic History    Marital status:      Spouse name: Not on file    Number of children: Not on file    Years of education: Not on file    Highest education level: Not on file   Occupational History    Not on file   Social Needs    Financial resource strain: Not on file    Food insecurity:     Worry: Not on file     Inability: Not on file    Transportation needs:     Medical: Not on file     Non-medical: Not on file   Tobacco Use    Smoking status: Former Smoker     Packs/day: 1.00     Years: 40.00     Pack years: 40.00     Last attempt to quit: 5/3/1984     Years since quittin.2    Smokeless tobacco: Never Used   Substance and Sexual Activity    Alcohol use: No    Drug use: No    Sexual activity: Not on file   Lifestyle    Physical activity:     Days per week: Not on file     Minutes per session: Not on file    Stress: Not on file   Relationships    Social connections:     Talks on phone: Not on file     Gets together: Not on file     Attends [employer] service: Not on file     Active member of club or organization: Not on file     Attends meetings of clubs or organizations: Not on file     Relationship status: Not on file   Other Topics Concern    Not on file   Social History Narrative    Not on file       ROS:  Constitution: Negative for chills, fever, and sweats. Negative for unexplained weight loss.  Eyes: no redness, no discharge  Ears: no ear pain or tinnitus  Cardiovascular: Negative for chest pain,  "irregular heartbeat, leg swelling and palpitations.   Respiratory: Negative for cough and shortness of breath.   Gastrointestinal: Negative for abdominal pain, nausea and vomiting.   Genitourinary: Negative for bladder incontinence and dysuria.   Neurological: Negative for numbness.   Psychiatric/Behavioral: Negative for behavior changes.   Endocrine: Negative for palpitations.   Hematologic/Lymphatic: Negative for bleeding disorders.  Skin: Negative for pruritis or rash.       PHYSICAL EXAM:  Ht 5' 4" (1.626 m)   Wt 54.9 kg (121 lb 0.5 oz)   BMI 20.78 kg/m²   Tamie Fink is a well developed, well nourished female in no acute distress. Physical examination of the right knee evaluated the following:    Gait and Alignment  Inspection for ecchymosis, swelling, atrophy, or deformity  Inspection for intra-articular and/or bursal effusions  Tenderness to palpation over the bony and soft tissue structures around the knee  Range of Motion and presence of extensor lag/contractures  Sensation and motor strength  Varus/valgus or anterior/posterior/rotatory instability  Flexion pinch and Wilfrid's Tests  Patellar alignment/tracking/pain to palpation  Vascular exam to include skin temperature/color/capillary refill    Remarkable findings included:  No edema, no effusion, no ecchymosis or obvious deformity  tender to palpation about the joint line  ROM 0-120  Strength 5/5  No gross instability  Sensation intact  Skin warm, dry, intact      IMAGING:   X-ray obtained Right knee performed 03/13/19 personally reviewed with patient. Radiologist report as follows:   There is a small joint effusion on the right.  No acute fractures or dislocations visualized.  There is tricompartmental osteoarthritis on the right with moderate to severe joint space loss in the lateral tibiofemoral compartment.  Small tibiofemoral marginal osteophytes and mild joint space narrowing noted involving the left knee.Visualized osseous structures " appear diffusely osteopenic.    ASSESSMENT:   Right knee osteoarthritis    PLAN:  The nature of the diagnosis, using models and diagrams when appropriate, was explained to the patient in detail.  Synvisc-One injection  performed toRight knee today (see procedure documentation).  She will monitor for signs and symptoms of infection/inflammation.  Instructed to elevate legs and apply ice tonight.  She will return for follow up evaluation as needed.

## 2019-07-17 NOTE — PROCEDURES
Large Joint Aspiration/Injection: R knee  Date/Time: 7/17/2019 1:14 PM  Performed by: ABDULKADIR Cates  Authorized by: ABDULKADIR Cates     Consent Done?:  Yes (Verbal)  Indications:  Pain  Timeout: Prior to procedure the correct patient, procedure, and site was verified      Location:  Knee  Site:  R knee  Prep: Patient was prepped and draped in usual sterile fashion    Needle size:  22 G  Approach:  Anterolateral  Medications:  48 mg hylan g-f 20 48 mg/6 mL  Patient tolerance:  Patient tolerated the procedure well with no immediate complications

## 2019-08-07 ENCOUNTER — OFFICE VISIT (OUTPATIENT)
Dept: ORTHOPEDICS | Facility: CLINIC | Age: 84
End: 2019-08-07
Payer: MEDICARE

## 2019-08-07 VITALS
WEIGHT: 121 LBS | DIASTOLIC BLOOD PRESSURE: 54 MMHG | HEART RATE: 64 BPM | BODY MASS INDEX: 20.66 KG/M2 | HEIGHT: 64 IN | SYSTOLIC BLOOD PRESSURE: 117 MMHG

## 2019-08-07 DIAGNOSIS — M25.461 EFFUSION OF RIGHT KNEE: ICD-10-CM

## 2019-08-07 DIAGNOSIS — M17.11 PRIMARY OSTEOARTHRITIS OF RIGHT KNEE: Primary | ICD-10-CM

## 2019-08-07 PROCEDURE — 99213 OFFICE O/P EST LOW 20 MIN: CPT | Mod: 25,S$GLB,, | Performed by: NURSE PRACTITIONER

## 2019-08-07 PROCEDURE — 20610 LARGE JOINT ASPIRATION/INJECTION: R KNEE: ICD-10-PCS | Mod: RT,S$GLB,, | Performed by: NURSE PRACTITIONER

## 2019-08-07 PROCEDURE — 20610 DRAIN/INJ JOINT/BURSA W/O US: CPT | Mod: RT,S$GLB,, | Performed by: NURSE PRACTITIONER

## 2019-08-07 PROCEDURE — 99213 PR OFFICE/OUTPT VISIT, EST, LEVL III, 20-29 MIN: ICD-10-PCS | Mod: 25,S$GLB,, | Performed by: NURSE PRACTITIONER

## 2019-08-07 PROCEDURE — 99999 PR PBB SHADOW E&M-EST. PATIENT-LVL III: CPT | Mod: PBBFAC,,, | Performed by: NURSE PRACTITIONER

## 2019-08-07 PROCEDURE — 99999 PR PBB SHADOW E&M-EST. PATIENT-LVL III: ICD-10-PCS | Mod: PBBFAC,,, | Performed by: NURSE PRACTITIONER

## 2019-08-07 RX ORDER — TRIAMCINOLONE ACETONIDE 40 MG/ML
40 INJECTION, SUSPENSION INTRA-ARTICULAR; INTRAMUSCULAR
Status: DISCONTINUED | OUTPATIENT
Start: 2019-08-07 | End: 2019-08-07 | Stop reason: HOSPADM

## 2019-08-07 RX ORDER — ATORVASTATIN CALCIUM 20 MG/1
TABLET, FILM COATED ORAL
Qty: 30 TABLET | Refills: 12 | Status: SHIPPED | OUTPATIENT
Start: 2019-08-07 | End: 2020-04-24 | Stop reason: SDUPTHER

## 2019-08-07 RX ADMIN — TRIAMCINOLONE ACETONIDE 40 MG: 40 INJECTION, SUSPENSION INTRA-ARTICULAR; INTRAMUSCULAR at 12:08

## 2019-08-07 NOTE — PROCEDURES
Large Joint Aspiration/Injection: R knee  Date/Time: 8/7/2019 12:26 PM  Performed by: ABDULKADIR Cates  Authorized by: ABDULKADIR Cates     Consent Done?:  Yes (Verbal)  Indications:  Pain  Timeout: Prior to procedure the correct patient, procedure, and site was verified      Location:  Knee  Site:  R knee  Prep: Patient was prepped and draped in usual sterile fashion    Ultrasonic Guidance for needle placement: No  Needle size:  18 G  Approach:  Superior  Medications:  40 mg triamcinolone acetonide 40 mg/mL  Aspirate amount (ml):  20  Aspirate:  Yellow and clear  Patient tolerance:  Patient tolerated the procedure well with no immediate complications

## 2019-08-26 ENCOUNTER — TELEPHONE (OUTPATIENT)
Dept: FAMILY MEDICINE | Facility: CLINIC | Age: 84
End: 2019-08-26

## 2019-08-26 NOTE — TELEPHONE ENCOUNTER
Attempted to call patient with Dr. Carlos's recommendations, received a message to enter remote access code.

## 2019-08-26 NOTE — TELEPHONE ENCOUNTER
Spoke with patient whom states she has not had a bowel movement in 4 days but is having a slimy discharge from the rectum, Patient states she has taken laxatives but still hasn't had a movement.  Please advise.

## 2019-08-26 NOTE — TELEPHONE ENCOUNTER
----- Message from Zachary Lynn sent at 8/26/2019 12:03 PM CDT -----  Contact: pt   States she's calling rg wanting to know if she needs to come in and be seen by Dr Carlos or another Dr and it's regarding her health. Poss having some colon probs and can be reached at [phone]//adal/dbw

## 2019-08-29 RX ORDER — TRAMADOL HYDROCHLORIDE 50 MG/1
50 TABLET ORAL EVERY 6 HOURS PRN
Qty: 120 TABLET | Refills: 2 | Status: SHIPPED | OUTPATIENT
Start: 2019-08-29 | End: 2020-02-03

## 2019-09-04 ENCOUNTER — TELEPHONE (OUTPATIENT)
Dept: FAMILY MEDICINE | Facility: CLINIC | Age: 84
End: 2019-09-04

## 2019-09-04 NOTE — TELEPHONE ENCOUNTER
----- Message from Perico Guzman sent at 9/4/2019  3:36 PM CDT -----  Contact: Xir-Bjver-[phone]  .Type:  Sooner Apoointment Request    Caller is requesting a sooner appointment.  Caller declined first available appointment listed below.  Caller will not accept being placed on the waitlist and is requesting a message be sent to doctor.  Name of Caller:Ousmane  When is the first available appointment?10/17/19  Symptoms:Weak /not her self   Would the patient rather a call back or a response via [facility]? Call back   Best Call Back Number: [phone]  Additional Information:

## 2019-09-05 ENCOUNTER — OFFICE VISIT (OUTPATIENT)
Dept: FAMILY MEDICINE | Facility: CLINIC | Age: 84
End: 2019-09-05
Payer: MEDICARE

## 2019-09-05 VITALS
WEIGHT: 119.19 LBS | TEMPERATURE: 99 F | SYSTOLIC BLOOD PRESSURE: 123 MMHG | BODY MASS INDEX: 20.35 KG/M2 | DIASTOLIC BLOOD PRESSURE: 67 MMHG | HEIGHT: 64 IN | HEART RATE: 70 BPM

## 2019-09-05 DIAGNOSIS — K52.9 COLITIS: Primary | ICD-10-CM

## 2019-09-05 PROCEDURE — 99999 PR PBB SHADOW E&M-EST. PATIENT-LVL III: ICD-10-PCS | Mod: PBBFAC,,, | Performed by: FAMILY MEDICINE

## 2019-09-05 PROCEDURE — 99213 PR OFFICE/OUTPT VISIT, EST, LEVL III, 20-29 MIN: ICD-10-PCS | Mod: S$GLB,,, | Performed by: FAMILY MEDICINE

## 2019-09-05 PROCEDURE — 1100F PR PT FALLS ASSESS DOC 2+ FALLS/FALL W/INJURY/YR: ICD-10-PCS | Mod: CPTII,S$GLB,, | Performed by: FAMILY MEDICINE

## 2019-09-05 PROCEDURE — 99999 PR PBB SHADOW E&M-EST. PATIENT-LVL III: CPT | Mod: PBBFAC,,, | Performed by: FAMILY MEDICINE

## 2019-09-05 PROCEDURE — 3288F FALL RISK ASSESSMENT DOCD: CPT | Mod: CPTII,S$GLB,, | Performed by: FAMILY MEDICINE

## 2019-09-05 PROCEDURE — 3288F PR FALLS RISK ASSESSMENT DOCUMENTED: ICD-10-PCS | Mod: CPTII,S$GLB,, | Performed by: FAMILY MEDICINE

## 2019-09-05 PROCEDURE — 99213 OFFICE O/P EST LOW 20 MIN: CPT | Mod: S$GLB,,, | Performed by: FAMILY MEDICINE

## 2019-09-05 PROCEDURE — 1100F PTFALLS ASSESS-DOCD GE2>/YR: CPT | Mod: CPTII,S$GLB,, | Performed by: FAMILY MEDICINE

## 2019-09-05 RX ORDER — OMEPRAZOLE 20 MG/1
20 CAPSULE, DELAYED RELEASE ORAL NIGHTLY
Qty: 90 CAPSULE | Refills: 4 | Status: SHIPPED | OUTPATIENT
Start: 2019-09-05 | End: 2020-04-24 | Stop reason: SDUPTHER

## 2019-09-05 RX ORDER — METHYLPREDNISOLONE 4 MG/1
TABLET ORAL
Qty: 1 PACKAGE | Refills: 0 | Status: SHIPPED | OUTPATIENT
Start: 2019-09-05 | End: 2020-03-06

## 2019-09-05 RX ORDER — METRONIDAZOLE 250 MG/1
250 TABLET ORAL 3 TIMES DAILY
Qty: 14 TABLET | Refills: 0 | Status: SHIPPED | OUTPATIENT
Start: 2019-09-05 | End: 2020-03-06

## 2019-09-05 NOTE — PROGRESS NOTES
The patient presents with a recent history of constipation without nausea vomiting but did pass small mucous no blood.No hematemesis,melena nor severe abdominal cramps.Still able to tolerate ldietPast Medical History:  Past Medical History:   Diagnosis Date    Allergy     Arthritis     Asthma     GERD (gastroesophageal reflux disease)     HEARING LOSS     Hypertension     Lung disease     Pneumonia      Past Surgical History:   Procedure Laterality Date    CAROTID STENT       Review of patient's allergies indicates:   Allergen Reactions    Montelukast Other (See Comments)     Weakness and disorientation      Current Outpatient Medications on File Prior to Visit   Medication Sig Dispense Refill    albuterol (VENTOLIN HFA) 90 mcg/actuation inhaler Inhale 2 puffs into the lungs.      atorvastatin (LIPITOR) 20 MG tablet TAKE 1 TABLET EVERY DAY 30 tablet 12    benazepril (LOTENSIN) 10 MG tablet Take 1 tablet (10 mg total) by mouth once daily. 90 tablet 3    clopidogrel (PLAVIX) 75 mg tablet TAKE ONE TABLET BY MOUTH ONCE DAILY 30 tablet 11    fluticasone-salmeterol 250-50 mcg/dose (ADVAIR) 250-50 mcg/dose diskus inhaler Inhale 1 puff into the lungs 2 (two) times daily. 60 each 5    omeprazole (PRILOSEC) 20 MG capsule Take 1 capsule (20 mg total) by mouth every evening. 90 capsule 4    pregabalin (LYRICA) 75 MG capsule Take 1 capsule (75 mg total) by mouth 2 (two) times daily. 60 capsule 5    traMADol (ULTRAM) 50 mg tablet TAKE 1 TABLET (50 MG TOTAL) BY MOUTH EVERY 6 (SIX) HOURS AS NEEDED. 120 tablet 2    ipratropium (ATROVENT HFA) 17 mcg/actuation inhaler Inhale 2 puffs into the lungs every 6 (six) hours as needed for Wheezing. 12.9 g 11    SENNOSIDES (SENOKOT ORAL) Take by mouth as needed.      [DISCONTINUED] methylPREDNISolone (MEDROL DOSEPACK) 4 mg tablet As directed 1 Package 0    [DISCONTINUED] pregabalin (LYRICA) 75 MG capsule Take 75 mg by mouth.       No current facility-administered  medications on file prior to visit.      Social History     Socioeconomic History    Marital status:      Spouse name: Not on file    Number of children: Not on file    Years of education: Not on file    Highest education level: Not on file   Occupational History    Not on file   Social Needs    Financial resource strain: Not on file    Food insecurity:     Worry: Not on file     Inability: Not on file    Transportation needs:     Medical: Not on file     Non-medical: Not on file   Tobacco Use    Smoking status: Former Smoker     Packs/day: 1.00     Years: 40.00     Pack years: 40.00     Last attempt to quit: 5/3/1984     Years since quittin.3    Smokeless tobacco: Never Used   Substance and Sexual Activity    Alcohol use: No    Drug use: No    Sexual activity: Not on file   Lifestyle    Physical activity:     Days per week: Not on file     Minutes per session: Not on file    Stress: Not on file   Relationships    Social connections:     Talks on phone: Not on file     Gets together: Not on file     Attends [employer]ism service: Not on file     Active member of club or organization: Not on file     Attends meetings of clubs or organizations: Not on file     Relationship status: Not on file   Other Topics Concern    Not on file   Social History Narrative    Not on file     Family History   Problem Relation Age of Onset    Cancer Mother     Heart disease Father     Breast cancer Sister     Breast cancer Maternal Aunt     Breast cancer Maternal Grandmother     Breast cancer Cousin     Breast cancer Maternal Aunt        ROS:  SKIN: No rashes, itching or changes in color or texture of skin.  EYES: Visual acuity fine. No photophobia, ocular pain or diplopia.EARS: Denies ear pain, discharge or vertigo.NOSE: No loss of smell, no epistaxis or postnasal drip.MOUTH & THROAT: No hoarseness or change in voice. No excessive gum bleeding.NODES: Denies swollen glands.  CHEST: Denies GERARDO, cyanosis,  wheezing, cough and sputum production.  CARDIOVASCULAR: Denies chest pain, PND, orthopnea or reduced exercise tolerance.  ABDOMEN:  No weight loss.Mild abdominal pain, no hematemesis or blood in stool.  URINARY: No flank pain, dysuria or hematuria.  PERIPHERAL VASCULAR: No claudication or cyanosis.  MUSCULOSKELETAL: Negative   NEUROLOGIC: No history of seizures, paralysis, alteration of gait or coordination.    PE Vital signs noted  Heent: Normocephalic,with no recent trauma,PERRLA,EOMI,conjunctiva clear with no exudate.Nasopharynx is not injected .Otic canal.TMs are not affected.Pharynx is slightly red.   Neck:Supple without adenopathy  Chest:Clear bilateral breath sounds normal  Heart:Regular rhthym without murmer  Abdomen:Soft, mild generalized tenderness,no rebound,no masses, no hepatosplenomegaly  Extremeties and Neurologic:Grossly within normal limits  Impression: Constipation  ? colitis  Plan:[place] diet as tolerated  Metronidazole  Medrol dspk

## 2019-10-01 ENCOUNTER — TELEPHONE (OUTPATIENT)
Dept: FAMILY MEDICINE | Facility: CLINIC | Age: 84
End: 2019-10-01

## 2019-10-01 NOTE — TELEPHONE ENCOUNTER
Pt is requesting something in writing from   Dr. Carlos. Regarding how long she should  stop her plavix for a dental procedure. Pt states to call if any additional information is needed for the letter.

## 2019-10-02 NOTE — TELEPHONE ENCOUNTER
MD Faviola Carmona MA   Caller: Unspecified (Yesterday,  2:18 PM)             5 days      Letter written and left at the , pt notified

## 2019-10-08 ENCOUNTER — TELEPHONE (OUTPATIENT)
Dept: FAMILY MEDICINE | Facility: CLINIC | Age: 84
End: 2019-10-08

## 2019-10-08 NOTE — TELEPHONE ENCOUNTER
----- Message from Kyara Fernandez sent at 10/8/2019  3:50 PM CDT -----  Contact: Ousmane-son  Patient requesting to have his pharmacy changed to [facility]. If needed, please call patient back at [phone]

## 2019-10-09 RX ORDER — CLOPIDOGREL BISULFATE 75 MG/1
TABLET ORAL
Qty: 30 TABLET | Refills: 11 | Status: SHIPPED | OUTPATIENT
Start: 2019-10-09 | End: 2020-04-24 | Stop reason: SDUPTHER

## 2019-10-18 ENCOUNTER — PATIENT OUTREACH (OUTPATIENT)
Dept: ADMINISTRATIVE | Facility: CLINIC | Age: 84
End: 2019-10-18

## 2019-10-18 NOTE — PATIENT INSTRUCTIONS
Discharge Instructions: COPD  You have been diagnosed with chronic obstructive pulmonary disease (COPD). This is a name given to a group of diseases that limit the flow of air in and out of your lungs. This makes it harder to breathe. With COPD, you are also more likely to get lung infections. COPD includes chronic bronchitis and emphysema. COPD is most often caused by heavy, long-term cigarette smoking.  Home care  Quit smoking  · If you smoke, quit. It is the best thing you can do for your COPD and your overall health.  · Join a stop-smoking program. There are even telephone, text message, and Internet programs to help you quit.  · Ask your healthcare provider about medicines or other methods to help you quit.  · Ask family members to quit smoking as well.  · Don't allow people to smoke in your home, in your car, or when they are around you.  Protect yourself from infection  · Wash your hands often. Do your best to keep your hands away from your face. Most germs are spread from your hands to your mouth.  · Get a flu shot every year. Also ask your provider about pneumonia vaccines.  · Avoid crowds. It's especially important to do this in the winter when more people have colds and flu.  · To stay healthy, get enough sleep, exercise regularly, and eat a balanced diet. You should:  ¨ Get about 8 hours of sleep every night.  ¨ Try to exercise for at least 30 minutes on most days.  ¨ Have healthy foods including fruits and vegetables, 100% whole grains, lean meats and fish, and low-fat dairy products. Try to stay away from foods high in fats and sugar.  Take your medicines  Take your medicines exactly as directed. Don't skip doses.  Manage your stress  Stress can make COPD worse. Use this stress management technique:  · Find a quiet place and sit or lie in a comfortable position.  · Close your eyes and perform breathing exercises for several minutes. Ask your provider about the best way to breathe.  Pulmonary  rehabilitation  · Pulmonary rehab can help you feel better. These programs include exercise, breathing techniques, information about COPD, counseling, and help for smokers.  · Ask your provider or your local hospital about programs in your area.  When to call your healthcare provider  Call your provider immediately if you have any of the following:  · Shortness of breath, wheezing, or coughing  · Increased mucus  · Yellow, green, bloody, or smelly mucus  · Fever or chills  · Tightness in your chest that does not go away with rest or medicine  · An irregular heartbeat or a feeling that your heart is beating very fast  · Swollen ankles   Date Last Reviewed: 5/1/2016  © 7783-4474 [employer]. [address] [place] [address], [place] [address]. All rights reserved. This information is not intended as a substitute for professional medical care. Always follow your healthcare professional's instructions.

## 2019-10-29 ENCOUNTER — TELEPHONE (OUTPATIENT)
Dept: FAMILY MEDICINE | Facility: CLINIC | Age: 84
End: 2019-10-29

## 2019-10-29 ENCOUNTER — PATIENT OUTREACH (OUTPATIENT)
Dept: ADMINISTRATIVE | Facility: HOSPITAL | Age: 84
End: 2019-10-29

## 2019-10-29 NOTE — TELEPHONE ENCOUNTER
Pt does not want to keep [facility] appt, states she wants to see Dr. Flores first then come in to see Dr. Carlos. appt booked and mailed to pt

## 2019-10-29 NOTE — TELEPHONE ENCOUNTER
----- Message from Maliha Goncalves sent at 10/29/2019  9:19 AM CDT -----  Contact: self/[phone]  Would like to consult with nurse(Faviola) regarding her appt on 10-30-19. Please call back at [phone]. Thanks/ar

## 2019-11-12 ENCOUNTER — OFFICE VISIT (OUTPATIENT)
Dept: FAMILY MEDICINE | Facility: CLINIC | Age: 84
End: 2019-11-12
Payer: MEDICARE

## 2019-11-12 VITALS
SYSTOLIC BLOOD PRESSURE: 136 MMHG | DIASTOLIC BLOOD PRESSURE: 68 MMHG | WEIGHT: 117 LBS | HEIGHT: 64 IN | HEART RATE: 94 BPM | BODY MASS INDEX: 19.97 KG/M2 | OXYGEN SATURATION: 96 %

## 2019-11-12 DIAGNOSIS — J47.1 BRONCHIECTASIS WITH ACUTE EXACERBATION: Primary | ICD-10-CM

## 2019-11-12 DIAGNOSIS — J44.9 CHRONIC OBSTRUCTIVE PULMONARY DISEASE, UNSPECIFIED COPD TYPE: ICD-10-CM

## 2019-11-12 DIAGNOSIS — I25.10 CORONARY ARTERY DISEASE, ANGINA PRESENCE UNSPECIFIED, UNSPECIFIED VESSEL OR LESION TYPE, UNSPECIFIED WHETHER NATIVE OR TRANSPLANTED HEART: ICD-10-CM

## 2019-11-12 PROCEDURE — 99999 PR PBB SHADOW E&M-EST. PATIENT-LVL IV: ICD-10-PCS | Mod: PBBFAC,,, | Performed by: FAMILY MEDICINE

## 2019-11-12 PROCEDURE — 99214 OFFICE O/P EST MOD 30 MIN: CPT | Mod: S$GLB,,, | Performed by: FAMILY MEDICINE

## 2019-11-12 PROCEDURE — 99499 RISK ADDL DX/OHS AUDIT: ICD-10-PCS | Mod: S$GLB,,, | Performed by: FAMILY MEDICINE

## 2019-11-12 PROCEDURE — 1101F PR PT FALLS ASSESS DOC 0-1 FALLS W/OUT INJ PAST YR: ICD-10-PCS | Mod: CPTII,S$GLB,, | Performed by: FAMILY MEDICINE

## 2019-11-12 PROCEDURE — 99499 UNLISTED E&M SERVICE: CPT | Mod: S$GLB,,, | Performed by: FAMILY MEDICINE

## 2019-11-12 PROCEDURE — 99214 PR OFFICE/OUTPT VISIT, EST, LEVL IV, 30-39 MIN: ICD-10-PCS | Mod: S$GLB,,, | Performed by: FAMILY MEDICINE

## 2019-11-12 PROCEDURE — 1101F PT FALLS ASSESS-DOCD LE1/YR: CPT | Mod: CPTII,S$GLB,, | Performed by: FAMILY MEDICINE

## 2019-11-12 PROCEDURE — 99999 PR PBB SHADOW E&M-EST. PATIENT-LVL IV: CPT | Mod: PBBFAC,,, | Performed by: FAMILY MEDICINE

## 2019-11-12 NOTE — PROGRESS NOTES
Fu p admit and ERV exac dyspnea Doing better now on o2    Note also recent ER visit-copied Diagnosis management comments: 88-year-old female to emergency room with complaints of cough productive of bloody sputum occurring last night. No reports of fever or chest pain. Chest x-ray with chronic lung scarring and history of COPD on home oxygen therapy. Stable chronic anemia noted. Metabolic panel unremarkable. Vitals remained stable in the emergency room without evidence of hypoxia on 2 L nasal cannula and sats at 96%.  Patient will be treated for bronchitis with doxycycline and steroid therapy. She is encouraged to follow with her primary care physician as scheduled next week. Notify to return for fever, worsening shortness of breath or chest pains.       Past Medical History:  Past Medical History:   Diagnosis Date    Allergy     Arthritis     Asthma     GERD (gastroesophageal reflux disease)     HEARING LOSS     Hypertension     Lung disease     Pneumonia      Past Surgical History:   Procedure Laterality Date    CAROTID STENT       Review of patient's allergies indicates:   Allergen Reactions    Montelukast Other (See Comments)     Weakness and disorientation      Current Outpatient Medications on File Prior to Visit   Medication Sig Dispense Refill    albuterol (VENTOLIN HFA) 90 mcg/actuation inhaler Inhale 2 puffs into the lungs.      atorvastatin (LIPITOR) 20 MG tablet TAKE 1 TABLET EVERY DAY 30 tablet 12    benazepril (LOTENSIN) 10 MG tablet Take 1 tablet (10 mg total) by mouth once daily. 90 tablet 3    clopidogrel (PLAVIX) 75 mg tablet TAKE ONE TABLET BY MOUTH ONCE DAILY 30 tablet 11    fluticasone-salmeterol 250-50 mcg/dose (ADVAIR) 250-50 mcg/dose diskus inhaler Inhale 1 puff into the lungs 2 (two) times daily. 60 each 5    ipratropium (ATROVENT HFA) 17 mcg/actuation inhaler Inhale 2 puffs into the lungs every 6 (six) hours as needed for Wheezing. 12.9 g 11    methylPREDNISolone (MEDROL  DOSEPACK) 4 mg tablet As directed 1 Package 0    metroNIDAZOLE (FLAGYL) 250 MG tablet Take 1 tablet (250 mg total) by mouth 3 (three) times daily. 14 tablet 0    omeprazole (PRILOSEC) 20 MG capsule Take 1 capsule (20 mg total) by mouth every evening. 90 capsule 4    pregabalin (LYRICA) 75 MG capsule Take 1 capsule (75 mg total) by mouth 2 (two) times daily. 60 capsule 5    SENNOSIDES (SENOKOT ORAL) Take by mouth as needed.      traMADol (ULTRAM) 50 mg tablet TAKE 1 TABLET (50 MG TOTAL) BY MOUTH EVERY 6 (SIX) HOURS AS NEEDED. 120 tablet 2     No current facility-administered medications on file prior to visit.      Social History     Socioeconomic History    Marital status:      Spouse name: Not on file    Number of children: Not on file    Years of education: Not on file    Highest education level: Not on file   Occupational History    Not on file   Social Needs    Financial resource strain: Not on file    Food insecurity:     Worry: Not on file     Inability: Not on file    Transportation needs:     Medical: Not on file     Non-medical: Not on file   Tobacco Use    Smoking status: Former Smoker     Packs/day: 1.00     Years: 40.00     Pack years: 40.00     Last attempt to quit: 5/3/1984     Years since quittin.5    Smokeless tobacco: Never Used   Substance and Sexual Activity    Alcohol use: No    Drug use: No    Sexual activity: Not on file   Lifestyle    Physical activity:     Days per week: Not on file     Minutes per session: Not on file    Stress: Not on file   Relationships    Social connections:     Talks on phone: Not on file     Gets together: Not on file     Attends Zoroastrian service: Not on file     Active member of club or organization: Not on file     Attends meetings of clubs or organizations: Not on file     Relationship status: Not on file   Other Topics Concern    Not on file   Social History Narrative    Not on file     Family History   Problem Relation Age of  Onset    Cancer Mother     Heart disease Father     Breast cancer Sister     Breast cancer Maternal Aunt     Breast cancer Maternal Grandmother     Breast cancer Cousin     Breast cancer Maternal Aunt          ROS:GENERAL: No fever, chills, fatigability or weight loss.  SKIN: No rashes, itching or changes in color or texture of skin.  HEAD: No headaches or recent head trauma.EYES: Visual acuity fine. No photophobia, ocular pain or diplopia.EARS: Denies ear pain, discharge or vertigo.NOSE: No loss of smell, no epistaxis or postnasal drip.MOUTH & THROAT: No hoarseness or change in voice. No excessive gum bleeding.NODES: Denies swollen glands.  CHEST: Denies GERARDO, cyanosis, wheezing, cough and sputum production.  CARDIOVASCULAR: Denies chest pain, PND, orthopnea or reduced exercise tolerance.  ABDOMEN: Appetite fine. No weight loss. Denies diarrhea, abdominal pain, hematemesis or blood in stool.  URINARY: No flank pain, dysuria or hematuria.  PERIPHERAL VASCULAR: No claudication or cyanosis.  MUSCULOSKELETAL: See above.  NEUROLOGIC: No history of seizures, paralysis, alteration of gait or coordination.  PE:    HEAD: Normocephalic, atraumatic.EYES: PERRL. EOMI.   EARS: TM's intact. Light reflex normal. No retraction or perforation.   NOSE: Mucosa pink. Airway clear.MOUTH & THROAT: No tonsillar enlargement. No pharyngeal erythema or exudate. No stridor.  NODES: No cervical, axillary or inguinal lymph node enlargement.  CHEST: Lungs clear to auscultation.  CARDIOVASCULAR: Normal S1, S2. No rubs, murmurs or gallops.  ABDOMEN: Bowel sounds normal. Not distended. Soft. No tenderness or masses.  MUSCULOSKELETAL: No palpable abnormality  NEUROLOGIC: Cranial Nerves: II-XII grossly intact.  Motor: 5/5 strength major flexors/extensors.  DTR's: Knees, Ankles 2+ and equal bilaterally; downgoing toes.  Sensory: Intact to light touch distally.  Gait & Posture: Normal gait and fine motion. No cerebellar  signs.  COPD/Bronchiectasis     FU Pulm con  Card con hx CAD

## 2019-11-19 ENCOUNTER — TELEPHONE (OUTPATIENT)
Dept: FAMILY MEDICINE | Facility: CLINIC | Age: 84
End: 2019-11-19

## 2019-11-19 NOTE — TELEPHONE ENCOUNTER
Spoke with patient, patient feels shaky states she just finished 10 days of steroids and thinks it's from that, it has subsided now.  Patient instructed that should it return or get worse to report to the ER, patient verbalized understanding. Please advise

## 2019-11-19 NOTE — TELEPHONE ENCOUNTER
----- Message from Donna Reeves sent at 11/19/2019  4:12 PM CST -----  Contact: Pt  Pt asked that nurse return her call

## 2019-11-20 NOTE — TELEPHONE ENCOUNTER
"Spoke with patient to see how she was feeling and if she has had anymore "cherie banegas", patient states she haqs not, informed patient Dr Carlos would want her to have labs if she experiences this again, verbalized understanding stated she would let me know.  We also spoke about cardiology referral, patient to make appt with [facility], message sent to pre service to work referral  "

## 2019-12-14 ENCOUNTER — TELEPHONE (OUTPATIENT)
Dept: FAMILY MEDICINE | Facility: CLINIC | Age: 84
End: 2019-12-14

## 2019-12-14 RX ORDER — MUPIROCIN 20 MG/G
OINTMENT TOPICAL 2 TIMES DAILY
Qty: 30 G | Refills: 0 | Status: SHIPPED | OUTPATIENT
Start: 2019-12-14

## 2019-12-14 RX ORDER — DOXYCYCLINE 100 MG/1
100 CAPSULE ORAL 2 TIMES DAILY
Qty: 10 CAPSULE | Refills: 0 | Status: SHIPPED | OUTPATIENT
Start: 2019-12-14 | End: 2019-12-19

## 2019-12-14 NOTE — TELEPHONE ENCOUNTER
Prescription sent.  Need to check to see when she had her last Tdap immunization.  If not within the past 10 years then she probably would need to have this updated.

## 2019-12-14 NOTE — TELEPHONE ENCOUNTER
----- Message from Faviola Mcdaniels MA sent at 12/14/2019 11:32 AM CST -----  Pt has a cut on her leg from about 3 days ago that is red and swollen. Asking for an antibiotic and an ointment to put on it to be called in.

## 2019-12-22 RX ORDER — BENAZEPRIL HYDROCHLORIDE 10 MG/1
10 TABLET ORAL DAILY
Qty: 90 TABLET | Refills: 3 | Status: SHIPPED | OUTPATIENT
Start: 2019-12-22 | End: 2020-04-24 | Stop reason: SDUPTHER

## 2020-01-02 RX ORDER — PREGABALIN 75 MG/1
CAPSULE ORAL
Qty: 60 CAPSULE | Refills: 4 | Status: SHIPPED | OUTPATIENT
Start: 2020-01-02 | End: 2020-04-24 | Stop reason: SDUPTHER

## 2020-01-27 ENCOUNTER — TELEPHONE (OUTPATIENT)
Dept: FAMILY MEDICINE | Facility: CLINIC | Age: 85
End: 2020-01-27

## 2020-01-27 DIAGNOSIS — Z12.31 SCREENING MAMMOGRAM, ENCOUNTER FOR: Primary | ICD-10-CM

## 2020-01-27 NOTE — TELEPHONE ENCOUNTER
----- Message from Domenica Soares sent at 1/27/2020 11:57 AM CST -----  Contact: Paula/daughter  Please call pt daughter @ [phone] regarding an order for pt to get a mammograph.

## 2020-02-03 RX ORDER — TRAMADOL HYDROCHLORIDE 50 MG/1
TABLET ORAL
Qty: 120 TABLET | Refills: 1 | Status: SHIPPED | OUTPATIENT
Start: 2020-02-03 | End: 2020-04-24 | Stop reason: SDUPTHER

## 2020-03-06 ENCOUNTER — OFFICE VISIT (OUTPATIENT)
Dept: FAMILY MEDICINE | Facility: CLINIC | Age: 85
End: 2020-03-06
Payer: MEDICARE

## 2020-03-06 VITALS
HEART RATE: 90 BPM | HEIGHT: 64 IN | OXYGEN SATURATION: 93 % | DIASTOLIC BLOOD PRESSURE: 76 MMHG | BODY MASS INDEX: 17.89 KG/M2 | SYSTOLIC BLOOD PRESSURE: 130 MMHG | TEMPERATURE: 99 F | WEIGHT: 104.81 LBS

## 2020-03-06 DIAGNOSIS — I42.1 HOCM (HYPERTROPHIC OBSTRUCTIVE CARDIOMYOPATHY): ICD-10-CM

## 2020-03-06 DIAGNOSIS — J44.9 CHRONIC OBSTRUCTIVE PULMONARY DISEASE, UNSPECIFIED COPD TYPE: ICD-10-CM

## 2020-03-06 DIAGNOSIS — Z79.899 ENCOUNTER FOR LONG-TERM (CURRENT) USE OF MEDICATIONS: Primary | ICD-10-CM

## 2020-03-06 DIAGNOSIS — Z99.81 CHRONIC RESPIRATORY FAILURE WITH HYPOXIA, ON HOME OXYGEN THERAPY: ICD-10-CM

## 2020-03-06 DIAGNOSIS — R26.81 UNSTEADY GAIT: ICD-10-CM

## 2020-03-06 DIAGNOSIS — E27.8 OTHER SPECIFIED DISORDERS OF ADRENAL GLAND: ICD-10-CM

## 2020-03-06 DIAGNOSIS — R63.4 WEIGHT LOSS: ICD-10-CM

## 2020-03-06 DIAGNOSIS — J96.11 CHRONIC RESPIRATORY FAILURE WITH HYPOXIA, ON HOME OXYGEN THERAPY: ICD-10-CM

## 2020-03-06 PROBLEM — R01.1 SYSTOLIC EJECTION MURMUR: Status: ACTIVE | Noted: 2019-10-14

## 2020-03-06 PROBLEM — J47.1 BRONCHIECTASIS WITH ACUTE EXACERBATION: Status: RESOLVED | Noted: 2019-11-12 | Resolved: 2020-03-06

## 2020-03-06 PROBLEM — K44.9 HIATAL HERNIA WITH GERD: Status: ACTIVE | Noted: 2019-10-14

## 2020-03-06 PROBLEM — D64.9 ANEMIA: Status: ACTIVE | Noted: 2020-03-06

## 2020-03-06 PROBLEM — L03.115 CELLULITIS OF RIGHT LOWER EXTREMITY: Status: ACTIVE | Noted: 2019-12-23

## 2020-03-06 PROBLEM — R01.1 MURMUR, CARDIAC: Status: ACTIVE | Noted: 2019-11-20

## 2020-03-06 PROBLEM — K21.9 HIATAL HERNIA WITH GERD: Status: ACTIVE | Noted: 2019-10-14

## 2020-03-06 PROBLEM — R42 VERTIGO: Status: ACTIVE | Noted: 2019-10-14

## 2020-03-06 PROBLEM — M15.9 DEGENERATIVE JOINT DISEASE INVOLVING MULTIPLE JOINTS: Status: ACTIVE | Noted: 2020-03-06

## 2020-03-06 PROBLEM — J98.4 CAVITARY LESION OF LUNG: Status: ACTIVE | Noted: 2020-03-06

## 2020-03-06 PROBLEM — R00.2 PALPITATIONS: Status: ACTIVE | Noted: 2019-11-20

## 2020-03-06 PROCEDURE — 99999 PR PBB SHADOW E&M-EST. PATIENT-LVL III: CPT | Mod: PBBFAC,,, | Performed by: FAMILY MEDICINE

## 2020-03-06 PROCEDURE — 99214 OFFICE O/P EST MOD 30 MIN: CPT | Mod: S$GLB,,, | Performed by: FAMILY MEDICINE

## 2020-03-06 PROCEDURE — 99499 UNLISTED E&M SERVICE: CPT | Mod: S$GLB,,, | Performed by: FAMILY MEDICINE

## 2020-03-06 PROCEDURE — 1159F MED LIST DOCD IN RCRD: CPT | Mod: S$GLB,,, | Performed by: FAMILY MEDICINE

## 2020-03-06 PROCEDURE — 1159F PR MEDICATION LIST DOCUMENTED IN MEDICAL RECORD: ICD-10-PCS | Mod: S$GLB,,, | Performed by: FAMILY MEDICINE

## 2020-03-06 PROCEDURE — 1126F PR PAIN SEVERITY QUANTIFIED, NO PAIN PRESENT: ICD-10-PCS | Mod: S$GLB,,, | Performed by: FAMILY MEDICINE

## 2020-03-06 PROCEDURE — 1101F PT FALLS ASSESS-DOCD LE1/YR: CPT | Mod: CPTII,S$GLB,, | Performed by: FAMILY MEDICINE

## 2020-03-06 PROCEDURE — 99499 RISK ADDL DX/OHS AUDIT: ICD-10-PCS | Mod: S$GLB,,, | Performed by: FAMILY MEDICINE

## 2020-03-06 PROCEDURE — 1126F AMNT PAIN NOTED NONE PRSNT: CPT | Mod: S$GLB,,, | Performed by: FAMILY MEDICINE

## 2020-03-06 PROCEDURE — 99999 PR PBB SHADOW E&M-EST. PATIENT-LVL III: ICD-10-PCS | Mod: PBBFAC,,, | Performed by: FAMILY MEDICINE

## 2020-03-06 PROCEDURE — 99214 PR OFFICE/OUTPT VISIT, EST, LEVL IV, 30-39 MIN: ICD-10-PCS | Mod: S$GLB,,, | Performed by: FAMILY MEDICINE

## 2020-03-06 PROCEDURE — 1101F PR PT FALLS ASSESS DOC 0-1 FALLS W/OUT INJ PAST YR: ICD-10-PCS | Mod: CPTII,S$GLB,, | Performed by: FAMILY MEDICINE

## 2020-03-06 NOTE — PATIENT INSTRUCTIONS
Follow up in about 3 months (around 6/6/2020), or if symptoms worsen or fail to improve, for Med refills.     If no improvement in symptoms or symptoms worsen, please be advised to call MD, follow-up at clinic and/or go to ER if becomes severe.    José Manuel Tyler M.D.        We Offer TELEHEALTH & Same Day Appointments!   Book your Telehealth appointment with me through my nurse or   Clinic appointments on [employer]!    [address] [place] [address]    Office: [phone]   FAX: [phone]    Check out my Facebook Page and Follow Me at: [email or web address]    Check out my website at [employer] by clicking on: [email or web address]    To Schedule appointments online, go to [email or web address]: [email or web address]

## 2020-03-06 NOTE — PROGRESS NOTES
======================================================  GOAL of current visit: [facility]    Previous PCP:Dr. Amador Carlos  Specialists: Pulmonology Dr. Flores @ [facility]; Dr. Camejo cardiology  Recent lab work: 2018  Recent imagin    Health Maintenance Due   Topic Date Due    TETANUS VACCINE  1949    Pneumococcal Vaccine (65+ Low/Medium Risk) (1 of 2 - PCV13) 1996    Mammogram  2020     ======================================================  PLAN:      Problem List Items Addressed This Visit     Chronic obstructive pulmonary disease (Chronic)     Patient on 2 L nasal cannula today.  Requesting records from Pulmonary.  Continue follow-up and nebulizer and inhalers.  ER precautions.         HOCM (hypertrophic obstructive cardiomyopathy) (Chronic)     Continue follow-up with Cardiology.Dr. Camejo cardiology ER precautions.         Encounter for long-term (current) use of medications - Primary (Chronic)     Patient and family defer blood work at this time due to her age.    Complete history and physical was completed today.  Complete and thorough medication reconciliation was performed.  Discussed risks and benefits of medications.  Advised patient on orders and health maintenance.  We discussed old records and old labs if available.  Will request any records not available through epic.  Continue current medications listed on your summary sheet.           Other specified disorders of adrenal gland (Chronic)     Defer labs.  Continue to monitor for signs symptoms.         Unsteady gait     Patient deferred physical therapy.  Patient is agreeable to a cane but likes to use her oxygen carrier.  Fall precautions.  ER precautions.  Patient advised to increase caloric intake.  See weight loss.         Relevant Orders    CANE FOR HOME USE    Weight loss     Defer further workup per family and patient.  They understand all risks and benefits of doing such.  Advised to increase caloric intake with  supplement drinks with ensure or boost .  Offered appetite stimulant if this fails.    Spoke with patient about home health versus AIM but they are not interested at this time.  She will let me know if this changes.             Chronic respiratory failure with hypoxia, on home oxygen therapy        Future Appointments     Date Provider Specialty Appt Notes    4/3/2020 José Manuel Tyler MD Family Medicine 4 week f/u    4/30/2020 Amador Carlos MD Family Medicine tramadol refill           Medication List with Changes/Refills   Current Medications    ALBUTEROL (VENTOLIN HFA) 90 MCG/ACTUATION INHALER    Inhale 2 puffs into the lungs.    ATORVASTATIN (LIPITOR) 20 MG TABLET    TAKE 1 TABLET EVERY DAY    BENAZEPRIL (LOTENSIN) 10 MG TABLET    Take 1 tablet (10 mg total) by mouth once daily.    CLOPIDOGREL (PLAVIX) 75 MG TABLET    TAKE ONE TABLET BY MOUTH ONCE DAILY    FLUTICASONE-UMECLIDIN-VILANTER (TRELEGY ELLIPTA) 100-62.5-25 MCG DSDV    Inhale 1 puff into the lungs.    IPRATROPIUM (ATROVENT HFA) 17 MCG/ACTUATION INHALER    Inhale 2 puffs into the lungs every 6 (six) hours. Rescue    MUPIROCIN (BACTROBAN) 2 % OINTMENT    Apply topically 2 (two) times daily.    OMEPRAZOLE (PRILOSEC) 20 MG CAPSULE    Take 1 capsule (20 mg total) by mouth every evening.    PREGABALIN (LYRICA) 75 MG CAPSULE    TAKE ONE CAPSULE BY MOUTH TWICE DAILY    SENNOSIDES (SENOKOT ORAL)    Take by mouth as needed.    TRAMADOL (ULTRAM) 50 MG TABLET    TAKE 1 TABLET BY MOUTH EVERY 6 HOURS AS NEEDED   Discontinued Medications    FLUTICASONE-SALMETEROL 250-50 MCG/DOSE (ADVAIR) 250-50 MCG/DOSE DISKUS INHALER    Inhale 1 puff into the lungs 2 (two) times daily.    METHYLPREDNISOLONE (MEDROL DOSEPACK) 4 MG TABLET    As directed    METRONIDAZOLE (FLAGYL) 250 MG TABLET    Take 1 tablet (250 mg total) by mouth 3 (three) times daily.       José Manuel Tyler M.D.     ==========================================================================  Subjective:       Patient ID: Tamie Fink is a 89 y.o. female.  has a past medical history of Allergy, Arthritis, Asthma, GERD (gastroesophageal reflux disease), HEARING LOSS, Hypertension, Lung disease, and Pneumonia.     Chief Complaint: Establish Care (discuss care plan)    Patient needs tetanus vaccine, shingles vaccine, PCV 13 vaccine and influenza vaccine to satisfy health maintenance.  Problem List Items Addressed This Visit     Chronic obstructive pulmonary disease (Chronic)    Overview     Chronic.  Followed by Pulmonary.  Patient is using inhalers and nebulizer as prescribed.  Patient using 2 L of oxygen continuously.  Reports dyspnea on exertion is controlled.  Baseline pulse ox is about 93%         Current Assessment & Plan     Patient on 2 L nasal cannula today.  Requesting records from Pulmonary.  Continue follow-up and nebulizer and inhalers.  ER precautions.         HOCM (hypertrophic obstructive cardiomyopathy) (Chronic)    Overview     Chronic.  Control is uncertain.  Patient follows with cardiology.  Denies any chest pain or palpitations today.    Echo from December 2019 shows midcavity gradient    Last Assessment & Plan:   We discussed this diagnosis today. I do not think that she is symptomatic and would not pursue any further diagnostic work or options for therapy           Current Assessment & Plan     Continue follow-up with Cardiology.Dr. Camejo cardiology ER precautions.         Encounter for long-term (current) use of medications - Primary (Chronic)    Overview     CHRONIC. Stable. Compliant with medications for managed conditions. See medication list. No SE reported.   Routine lab analysis is being monitored. Refills were addressed.  Lab Results   Component Value Date    WBC 8.30 06/13/2017    HGB 11.6 (L) 06/13/2017    HCT 38.1 06/13/2017    MCV 97 06/13/2017     06/13/2017         Chemistry        Component Value Date/Time     06/13/2017 1449    K 4.6 06/13/2017 1449      06/13/2017 1449    CO2 31 (H) 06/13/2017 1449    BUN 20 06/13/2017 1449    CREATININE 0.9 06/13/2017 1449    GLU 90 06/13/2017 1449        Component Value Date/Time    CALCIUM 9.4 06/13/2017 1449    ALKPHOS 83 06/13/2017 1449    AST 15 06/13/2017 1449    ALT 7 (L) 06/13/2017 1449    BILITOT 0.2 06/13/2017 1449    ESTGFRAFRICA >60.0 06/13/2017 1449    EGFRNONAA 58.1 (A) 06/13/2017 1449          Lab Results   Component Value Date    TSH 1.237 06/13/2017    M7LSPHY 5.8 12/03/2014    FREET4 0.94 02/11/2015    T3FREE 2.8 02/11/2015              Current Assessment & Plan     Patient and family defer blood work at this time due to her age.    Complete history and physical was completed today.  Complete and thorough medication reconciliation was performed.  Discussed risks and benefits of medications.  Advised patient on orders and health maintenance.  We discussed old records and old labs if available.  Will request any records not available through epic.  Continue current medications listed on your summary sheet.           Other specified disorders of adrenal gland (Chronic)    Overview     Chronic.  Control is uncertain.  Patient and family defer any further testing for this condition.  Denies any symptoms of adrenal insufficiency or hyperactivity.         Current Assessment & Plan     Defer labs.  Continue to monitor for signs symptoms.         Unsteady gait    Overview     Chronic.  Worsening.  Patient does not like to use cane or walker.  She relies on her oxygen carrier for support.  Patient has fallen several times but is not hit her head.         Current Assessment & Plan     Patient deferred physical therapy.  Patient is agreeable to a cane but likes to use her oxygen carrier.  Fall precautions.  ER precautions.  Patient advised to increase caloric intake.  See weight loss.         Weight loss    Overview     Chronic.  Worsening.  Patient is currently at a BMI of 18.  Weight is 104.  Patient is concerned about her  weight but thinks it is due to her lung condition.  Patient states that she has a bacterial infection in her lungs that causes her to not want to eat.    Of note patient daughter reports had the pulmonologist is concerned that it may be malignancy.  However the patient and the family do not want to know if it is cancer or not.  No further testing or biopsy will be done.  Patient is deferring labs.         Current Assessment & Plan     Defer further workup per family and patient.  They understand all risks and benefits of doing such.  Advised to increase caloric intake with supplement drinks with ensure or boost .  Offered appetite stimulant if this fails.    Spoke with patient about home health versus AIM but they are not interested at this time.  She will let me know if this changes.             Chronic respiratory failure with hypoxia, on home oxygen therapy    Overview     See COPD.                Past Medical History:  Past Medical History:   Diagnosis Date    Allergy     Arthritis     Asthma     GERD (gastroesophageal reflux disease)     HEARING LOSS     Hypertension     Lung disease     Pneumonia      Past Surgical History:   Procedure Laterality Date    CAROTID STENT       Review of patient's allergies indicates:   Allergen Reactions    Montelukast Other (See Comments)     Weakness and disorientation      Medication List with Changes/Refills   Current Medications    ALBUTEROL (VENTOLIN HFA) 90 MCG/ACTUATION INHALER    Inhale 2 puffs into the lungs.    ATORVASTATIN (LIPITOR) 20 MG TABLET    TAKE 1 TABLET EVERY DAY    BENAZEPRIL (LOTENSIN) 10 MG TABLET    Take 1 tablet (10 mg total) by mouth once daily.    CLOPIDOGREL (PLAVIX) 75 MG TABLET    TAKE ONE TABLET BY MOUTH ONCE DAILY    FLUTICASONE-UMECLIDIN-VILANTER (TRELEGY ELLIPTA) 100-62.5-25 MCG DSDV    Inhale 1 puff into the lungs.    IPRATROPIUM (ATROVENT HFA) 17 MCG/ACTUATION INHALER    Inhale 2 puffs into the lungs every 6 (six) hours. Rescue     MUPIROCIN (BACTROBAN) 2 % OINTMENT    Apply topically 2 (two) times daily.    OMEPRAZOLE (PRILOSEC) 20 MG CAPSULE    Take 1 capsule (20 mg total) by mouth every evening.    PREGABALIN (LYRICA) 75 MG CAPSULE    TAKE ONE CAPSULE BY MOUTH TWICE DAILY    SENNOSIDES (SENOKOT ORAL)    Take by mouth as needed.    TRAMADOL (ULTRAM) 50 MG TABLET    TAKE 1 TABLET BY MOUTH EVERY 6 HOURS AS NEEDED   Discontinued Medications    FLUTICASONE-SALMETEROL 250-50 MCG/DOSE (ADVAIR) 250-50 MCG/DOSE DISKUS INHALER    Inhale 1 puff into the lungs 2 (two) times daily.    METHYLPREDNISOLONE (MEDROL DOSEPACK) 4 MG TABLET    As directed    METRONIDAZOLE (FLAGYL) 250 MG TABLET    Take 1 tablet (250 mg total) by mouth 3 (three) times daily.      Social History     Tobacco Use    Smoking status: Former Smoker     Packs/day: 1.00     Years: 40.00     Pack years: 40.00     Last attempt to quit: 5/3/1984     Years since quittin.8    Smokeless tobacco: Never Used   Substance Use Topics    Alcohol use: No      Family History   Problem Relation Age of Onset    Cancer Mother     Heart disease Father     Breast cancer Sister     Breast cancer Maternal Aunt     Breast cancer Maternal Grandmother     Breast cancer Cousin     Breast cancer Maternal Aunt        I have reviewed the complete PMH, social history, surgical history, allergies and medications.  As well as family history.    Review of Systems   Constitutional: Positive for activity change, appetite change, fatigue and unexpected weight change.   HENT: Negative for congestion and sinus pain.    Eyes: Negative for visual disturbance.   Respiratory: Positive for shortness of breath. Negative for chest tightness.    Cardiovascular: Negative for palpitations and leg swelling.   Gastrointestinal: Negative for abdominal pain, diarrhea and nausea.   Endocrine: Negative for polyuria.   Genitourinary: Negative for difficulty urinating and frequency.   Musculoskeletal: Positive for arthralgias  "and gait problem. Negative for joint swelling.   Skin: Negative for rash.   Neurological: Positive for weakness. Negative for dizziness, speech difficulty and headaches.   Psychiatric/Behavioral: Negative for agitation. The patient is not nervous/anxious.      Objective:   /76   Pulse 90   Temp 98.5 °F (36.9 °C) (Oral)   Ht 5' 4" (1.626 m)   Wt 47.5 kg (104 lb 12.8 oz)   SpO2 (!) 93%   BMI 17.99 kg/m²   Physical Exam   Constitutional: She is oriented to person, place, and time. She appears well-developed and well-nourished. No distress.   HENT:   Head: Normocephalic and atraumatic.   Eyes: Pupils are equal, round, and reactive to light. EOM are normal.   Neck: Normal range of motion. Neck supple.   Cardiovascular: Normal rate, regular rhythm, normal heart sounds and intact distal pulses.   No murmur heard.  Pulmonary/Chest: Effort normal. No respiratory distress. She has wheezes. She exhibits no tenderness.   On 2 L nasal cannula   Musculoskeletal: Normal range of motion. She exhibits no edema.   Neurological: She is alert and oriented to person, place, and time. She is not disoriented. She displays atrophy. She displays no tremor. No cranial nerve deficit or sensory deficit. Coordination and gait abnormal. GCS eye subscore is 4. GCS verbal subscore is 5. GCS motor subscore is 6.   Generalized weakness throughout upper extremity and lower extremities.  No slurring of speech   Skin: Skin is warm and dry. Capillary refill takes less than 2 seconds.   Psychiatric: She has a normal mood and affect. Her behavior is normal.   Nursing note and vitals reviewed.      Assessment:     1. Encounter for long-term (current) use of medications    2. Unsteady gait    3. Weight loss    4. Other specified disorders of adrenal gland    5. HOCM (hypertrophic obstructive cardiomyopathy)    6. Chronic obstructive pulmonary disease, unspecified COPD type    7. Chronic respiratory failure with hypoxia, on home oxygen therapy  " "    MDM:   High medical complexity.  High medical risk.  I have Reviewed and summarized old records.  I have performed thorough medication reconciliation today and discussed risk and benefits of each medication.  I have reviewed labs and discussed with patient.  All questions were answered.  I am requesting old records and will review them once they are available.    I have signed for the following orders AND/OR meds.  Orders Placed This Encounter   Procedures    CANE FOR HOME USE     Order Specific Question:   Type of Cane:     Answer:   Wide Quad     Order Specific Question:   Height:     Answer:   5' 4" (1.626 m)     Order Specific Question:   Weight:     Answer:   47.5 kg (104 lb 12.8 oz)     Order Specific Question:   Length of need (1-99 months):     Answer:   99     Order Specific Question:   Please check all that apply:     Answer:   Patient's condition impairs ambulation.     Order Specific Question:   Please check all that apply:     Answer:   Patient is unable to safely ambulate without equipment.     Order Specific Question:   Please check all that apply:     Answer:   Altered sensory perception.     Order Specific Question:   Please check all that apply:     Answer:   Patient needs help to get in and out of chair.     Order Specific Question:   Please check all that apply:     Answer:   Cane will be used for gait training.           Follow up in about 3 months (around 6/6/2020), or if symptoms worsen or fail to improve, for Med refills.    If no improvement in symptoms or symptoms worsen, advised to call/follow-up at clinic or go to ER. Patient voiced understanding and all questions/concerns were addressed.     DISCLAIMER: This note was compiled by using a speech recognition dictation system and therefore please be aware that typographical / speech recognition errors can and do occur.  Please contact me if you see any errors specifically.    José Manuel Tyler M.D.       Office: [phone] " [facility] [place] [address]  FAX: [phone]

## 2020-03-07 PROBLEM — R63.4 WEIGHT LOSS: Status: ACTIVE | Noted: 2020-03-07

## 2020-03-07 PROBLEM — R26.81 UNSTEADY GAIT: Status: ACTIVE | Noted: 2020-03-07

## 2020-03-07 PROBLEM — J96.11 CHRONIC RESPIRATORY FAILURE WITH HYPOXIA, ON HOME OXYGEN THERAPY: Status: ACTIVE | Noted: 2020-03-07

## 2020-03-07 PROBLEM — I42.1 HOCM (HYPERTROPHIC OBSTRUCTIVE CARDIOMYOPATHY): Chronic | Status: ACTIVE | Noted: 2019-12-23

## 2020-03-07 PROBLEM — J44.9 CHRONIC OBSTRUCTIVE PULMONARY DISEASE: Chronic | Status: ACTIVE | Noted: 2019-11-12

## 2020-03-07 PROBLEM — E27.8 OTHER SPECIFIED DISORDERS OF ADRENAL GLAND: Chronic | Status: ACTIVE | Noted: 2020-03-06

## 2020-03-07 PROBLEM — Z99.81 CHRONIC RESPIRATORY FAILURE WITH HYPOXIA, ON HOME OXYGEN THERAPY: Status: ACTIVE | Noted: 2020-03-07

## 2020-03-07 NOTE — ASSESSMENT & PLAN NOTE
Patient and family defer blood work at this time due to her age.    Complete history and physical was completed today.  Complete and thorough medication reconciliation was performed.  Discussed risks and benefits of medications.  Advised patient on orders and health maintenance.  We discussed old records and old labs if available.  Will request any records not available through epic.  Continue current medications listed on your summary sheet.

## 2020-03-07 NOTE — ASSESSMENT & PLAN NOTE
Patient deferred physical therapy.  Patient is agreeable to a cane but likes to use her oxygen carrier.  Fall precautions.  ER precautions.  Patient advised to increase caloric intake.  See weight loss.

## 2020-03-07 NOTE — ASSESSMENT & PLAN NOTE
Patient on 2 L nasal cannula today.  Requesting records from Pulmonary.  Continue follow-up and nebulizer and inhalers.  ER precautions.

## 2020-03-07 NOTE — ASSESSMENT & PLAN NOTE
Defer further workup per family and patient.  They understand all risks and benefits of doing such.  Advised to increase caloric intake with supplement drinks with ensure or boost .  Offered appetite stimulant if this fails.    Spoke with patient about home health versus AIM but they are not interested at this time.  She will let me know if this changes.

## 2020-04-24 ENCOUNTER — OFFICE VISIT (OUTPATIENT)
Dept: FAMILY MEDICINE | Facility: CLINIC | Age: 85
End: 2020-04-24
Payer: MEDICARE

## 2020-04-24 DIAGNOSIS — Z79.899 ENCOUNTER FOR LONG-TERM (CURRENT) USE OF MEDICATIONS: Primary | ICD-10-CM

## 2020-04-24 DIAGNOSIS — R26.81 UNSTEADY GAIT: ICD-10-CM

## 2020-04-24 DIAGNOSIS — Z99.81 CHRONIC RESPIRATORY FAILURE WITH HYPOXIA, ON HOME OXYGEN THERAPY: ICD-10-CM

## 2020-04-24 DIAGNOSIS — J96.11 CHRONIC RESPIRATORY FAILURE WITH HYPOXIA, ON HOME OXYGEN THERAPY: ICD-10-CM

## 2020-04-24 PROCEDURE — 99441 PR PHYSICIAN TELEPHONE EVALUATION 5-10 MIN: CPT | Mod: 95,,, | Performed by: FAMILY MEDICINE

## 2020-04-24 PROCEDURE — 99441 PR PHYSICIAN TELEPHONE EVALUATION 5-10 MIN: ICD-10-PCS | Mod: 95,,, | Performed by: FAMILY MEDICINE

## 2020-04-24 RX ORDER — PREGABALIN 75 MG/1
75 CAPSULE ORAL 2 TIMES DAILY
Qty: 60 CAPSULE | Refills: 5 | Status: SHIPPED | OUTPATIENT
Start: 2020-04-24 | End: 2020-11-02

## 2020-04-24 RX ORDER — TRAMADOL HYDROCHLORIDE 50 MG/1
50 TABLET ORAL EVERY 6 HOURS PRN
Qty: 120 TABLET | Refills: 1 | Status: SHIPPED | OUTPATIENT
Start: 2020-04-24 | End: 2020-10-29 | Stop reason: SDUPTHER

## 2020-04-24 RX ORDER — ATORVASTATIN CALCIUM 20 MG/1
20 TABLET, FILM COATED ORAL DAILY
Qty: 90 TABLET | Refills: 3 | Status: SHIPPED | OUTPATIENT
Start: 2020-04-24 | End: 2021-04-26

## 2020-04-24 RX ORDER — CLOPIDOGREL BISULFATE 75 MG/1
75 TABLET ORAL DAILY
Qty: 90 TABLET | Refills: 3 | Status: SHIPPED | OUTPATIENT
Start: 2020-04-24 | End: 2021-06-29

## 2020-04-24 RX ORDER — OMEPRAZOLE 20 MG/1
20 CAPSULE, DELAYED RELEASE ORAL NIGHTLY
Qty: 90 CAPSULE | Refills: 4 | Status: SHIPPED | OUTPATIENT
Start: 2020-04-24 | End: 2021-06-30

## 2020-04-24 RX ORDER — BENAZEPRIL HYDROCHLORIDE 10 MG/1
10 TABLET ORAL DAILY
Qty: 90 TABLET | Refills: 3 | Status: SHIPPED | OUTPATIENT
Start: 2020-04-24 | End: 2021-05-07

## 2020-04-24 NOTE — PATIENT INSTRUCTIONS
Follow up in about 3 months (around 7/24/2020), or if symptoms worsen or fail to improve, for Med refills, LAB RESULTS.     If no improvement in symptoms or symptoms worsen, please be advised to call MD, follow-up at clinic and/or go to ER if becomes severe.    José Manuel Tyler M.D.        We Offer TELEHEALTH & Same Day Appointments!   Book your Telehealth appointment with me through my nurse or   Clinic appointments on [employer]!    [address] [place] [address]    Office: [phone]   FAX: [phone]    Check out my Facebook Page and Follow Me at: [email or web address]    Check out my website at Personally by clicking on: [email or web address]    To Schedule appointments online, go to [employer]: [email or web address]

## 2020-04-24 NOTE — ASSESSMENT & PLAN NOTE
Patient needing medications refilled.  Refill medications today.The patient was [place] in the [place] [employer]'s  Prescription Monitoring Program. There appears to be no incongruities with the patient's verbalized history.

## 2020-04-24 NOTE — PROGRESS NOTES
Established Patient - Audio Only Telehealth Visit     The patient location is:  Patient's home [place]  The chief complaint leading to consultation is:  Follow-up oxygen, unsteady gait and medication refills  Visit type: Virtual visit with audio only (telephone)     The reason for the audio only service rather than synchronous audio and video virtual visit was related to technical difficulties or patient preference/necessity.     Each patient to whom I provide medical services by telemedicine is:  (1) informed of the relationship between the physician and patient and the respective role of any other health care provider with respect to management of the patient; and (2) notified that they may decline to receive medical services by telemedicine and may withdraw from such care at any time. Patient verbally consented to receive this service via voice-only telephone call.    PLAN:      Problem List Items Addressed This Visit     Encounter for long-term (current) use of medications - Primary (Chronic)     Patient needing medications refilled.  Refill medications today.The patient was checked in the [facility] Board of Pharmacy's  Prescription Monitoring Program. There appears to be no incongruities with the patient's verbalized history.            Relevant Medications    traMADoL (ULTRAM) 50 mg tablet    pregabalin (LYRICA) 75 MG capsule    omeprazole (PRILOSEC) 20 MG capsule    clopidogreL (PLAVIX) 75 mg tablet    benazepriL (LOTENSIN) 10 MG tablet    atorvastatin (LIPITOR) 20 MG tablet    Unsteady gait (Chronic)     Patient advised to use cane as soon as possible.  Fall precautions.Patient deferred physical therapy.  Patient is agreeable to a cane but likes to use her oxygen carrier.  Fall precautions.  ER precautions.  Patient advised to increase caloric intake.  See weight loss.         Chronic respiratory failure with hypoxia, on home oxygen therapy (Chronic)     Continue oxygen therapy.  Continue inhalers  and follow-up with pulmonology.  ER precautions.             Future Appointments     Date Provider Specialty Appt Notes    4/24/2020 José Manuel Tyler MD Family Medicine 4 week f/u    5/4/2020 Amador Carlos MD Family Medicine tramadol refill           Medication List with Changes/Refills   Current Medications    ALBUTEROL (VENTOLIN HFA) 90 MCG/ACTUATION INHALER    Inhale 2 puffs into the lungs.    FLUTICASONE-UMECLIDIN-VILANTER (TRELEGY ELLIPTA) 100-62.5-25 MCG DSDV    Inhale 1 puff into the lungs.    IPRATROPIUM (ATROVENT HFA) 17 MCG/ACTUATION INHALER    Inhale 2 puffs into the lungs every 6 (six) hours. Rescue    MUPIROCIN (BACTROBAN) 2 % OINTMENT    Apply topically 2 (two) times daily.    SENNOSIDES (SENOKOT ORAL)    Take by mouth as needed.   Changed and/or Refilled Medications    Modified Medication Previous Medication    ATORVASTATIN (LIPITOR) 20 MG TABLET atorvastatin (LIPITOR) 20 MG tablet       Take 1 tablet (20 mg total) by mouth once daily.    TAKE 1 TABLET EVERY DAY    BENAZEPRIL (LOTENSIN) 10 MG TABLET benazepril (LOTENSIN) 10 MG tablet       Take 1 tablet (10 mg total) by mouth once daily.    Take 1 tablet (10 mg total) by mouth once daily.    CLOPIDOGREL (PLAVIX) 75 MG TABLET clopidogrel (PLAVIX) 75 mg tablet       Take 1 tablet (75 mg total) by mouth once daily.    TAKE ONE TABLET BY MOUTH ONCE DAILY    OMEPRAZOLE (PRILOSEC) 20 MG CAPSULE omeprazole (PRILOSEC) 20 MG capsule       Take 1 capsule (20 mg total) by mouth every evening.    Take 1 capsule (20 mg total) by mouth every evening.    PREGABALIN (LYRICA) 75 MG CAPSULE pregabalin (LYRICA) 75 MG capsule       Take 1 capsule (75 mg total) by mouth 2 (two) times daily.    TAKE ONE CAPSULE BY MOUTH TWICE DAILY    TRAMADOL (ULTRAM) 50 MG TABLET traMADol (ULTRAM) 50 mg tablet       Take 1 tablet (50 mg total) by mouth every 6 (six) hours as needed.    TAKE 1 TABLET BY MOUTH EVERY 6 HOURS AS NEEDED       José Manuel Tyler M.D.      ==========================================================================  Subjective:      Patient ID: Tamie Fink is a 89 y.o. female.  has a past medical history of Allergy, Arthritis, Asthma, GERD (gastroesophageal reflux disease), HEARING LOSS, Hypertension, Lung disease, and Pneumonia.     Chief Complaint: COPD and Medication Refill      Problem List Items Addressed This Visit     Encounter for long-term (current) use of medications - Primary (Chronic)    Overview     CHRONIC. Stable. Compliant with medications for managed conditions. See medication list. No SE reported.   Routine lab analysis is being monitored. Refills were addressed.  Lab Results   Component Value Date    WBC 8.30 06/13/2017    HGB 11.6 (L) 06/13/2017    HCT 38.1 06/13/2017    MCV 97 06/13/2017     06/13/2017         Chemistry        Component Value Date/Time     06/13/2017 1449    K 4.6 06/13/2017 1449     06/13/2017 1449    CO2 31 (H) 06/13/2017 1449    BUN 20 06/13/2017 1449    CREATININE 0.9 06/13/2017 1449    GLU 90 06/13/2017 1449        Component Value Date/Time    CALCIUM 9.4 06/13/2017 1449    ALKPHOS 83 06/13/2017 1449    AST 15 06/13/2017 1449    ALT 7 (L) 06/13/2017 1449    BILITOT 0.2 06/13/2017 1449    ESTGFRAFRICA >60.0 06/13/2017 1449    EGFRNONAA 58.1 (A) 06/13/2017 1449          Lab Results   Component Value Date    TSH 1.237 06/13/2017    Q4FAQRF 5.8 12/03/2014    FREET4 0.94 02/11/2015    T3FREE 2.8 02/11/2015              Current Assessment & Plan     Patient needing medications refilled.  Refill medications today.The patient was checked in the [facility] Board of Pharmacy's  Prescription Monitoring Program. There appears to be no incongruities with the patient's verbalized history.            Unsteady gait (Chronic)    Overview     Chronic.  Worsening.  Patient does not like to use cane or walker.  She relies on her oxygen carrier for support.  Patient has fallen several times but is not  hit her head.  =======================================================  APRIL 2020:  Patient has not fallen since our previous visit.  She has been very stable.  Patient did bring the prescription for cane to the DME supplier but has not been able to get it due to corona virus.  ======================================================         Current Assessment & Plan     Patient advised to use cane as soon as possible.  Fall precautions.Patient deferred physical therapy.  Patient is agreeable to a cane but likes to use her oxygen carrier.  Fall precautions.  ER precautions.  Patient advised to increase caloric intake.  See weight loss.         Chronic respiratory failure with hypoxia, on home oxygen therapy (Chronic)    Overview     See COPD.  =======================================================  APRIL 2020:  Patient reports that she did get her portable oxygen has been doing well.  Patient doing well on current inhalers.  Following up with pulmonology.  ======================================================         Current Assessment & Plan     Continue oxygen therapy.  Continue inhalers and follow-up with pulmonology.  ER precautions.                Past Medical History:  Past Medical History:   Diagnosis Date    Allergy     Arthritis     Asthma     GERD (gastroesophageal reflux disease)     HEARING LOSS     Hypertension     Lung disease     Pneumonia      Past Surgical History:   Procedure Laterality Date    CAROTID STENT       Review of patient's allergies indicates:   Allergen Reactions    Montelukast Other (See Comments)     Weakness and disorientation      Medication List with Changes/Refills   Current Medications    ALBUTEROL (VENTOLIN HFA) 90 MCG/ACTUATION INHALER    Inhale 2 puffs into the lungs.    FLUTICASONE-UMECLIDIN-VILANTER (TRELEGY ELLIPTA) 100-62.5-25 MCG DSDV    Inhale 1 puff into the lungs.    IPRATROPIUM (ATROVENT HFA) 17 MCG/ACTUATION INHALER    Inhale 2 puffs into the lungs every 6  (six) hours. Rescue    MUPIROCIN (BACTROBAN) 2 % OINTMENT    Apply topically 2 (two) times daily.    SENNOSIDES (SENOKOT ORAL)    Take by mouth as needed.   Changed and/or Refilled Medications    Modified Medication Previous Medication    ATORVASTATIN (LIPITOR) 20 MG TABLET atorvastatin (LIPITOR) 20 MG tablet       Take 1 tablet (20 mg total) by mouth once daily.    TAKE 1 TABLET EVERY DAY    BENAZEPRIL (LOTENSIN) 10 MG TABLET benazepril (LOTENSIN) 10 MG tablet       Take 1 tablet (10 mg total) by mouth once daily.    Take 1 tablet (10 mg total) by mouth once daily.    CLOPIDOGREL (PLAVIX) 75 MG TABLET clopidogrel (PLAVIX) 75 mg tablet       Take 1 tablet (75 mg total) by mouth once daily.    TAKE ONE TABLET BY MOUTH ONCE DAILY    OMEPRAZOLE (PRILOSEC) 20 MG CAPSULE omeprazole (PRILOSEC) 20 MG capsule       Take 1 capsule (20 mg total) by mouth every evening.    Take 1 capsule (20 mg total) by mouth every evening.    PREGABALIN (LYRICA) 75 MG CAPSULE pregabalin (LYRICA) 75 MG capsule       Take 1 capsule (75 mg total) by mouth 2 (two) times daily.    TAKE ONE CAPSULE BY MOUTH TWICE DAILY    TRAMADOL (ULTRAM) 50 MG TABLET traMADol (ULTRAM) 50 mg tablet       Take 1 tablet (50 mg total) by mouth every 6 (six) hours as needed.    TAKE 1 TABLET BY MOUTH EVERY 6 HOURS AS NEEDED      Social History     Tobacco Use    Smoking status: Former Smoker     Packs/day: 1.00     Years: 40.00     Pack years: 40.00     Last attempt to quit: 5/3/1984     Years since quittin.0    Smokeless tobacco: Never Used   Substance Use Topics    Alcohol use: No      Family History   Problem Relation Age of Onset    Cancer Mother     Heart disease Father     Breast cancer Sister     Breast cancer Maternal Aunt     Breast cancer Maternal Grandmother     Breast cancer Cousin     Breast cancer Maternal Aunt        I have reviewed the complete PMH, social history, surgical history, allergies and medications.  As well as family  history.    Review of Systems   See HPI otherwise negative  Objective:   There were no vitals taken for this visit.  Physical Exam  Limited due to phone interview only.  No acute distress.  Alert and oriented  Assessment:     1. Encounter for long-term (current) use of medications    2. Unsteady gait    3. Chronic respiratory failure with hypoxia, on home oxygen therapy      MDM:   Moderate complexity.  Moderate risk.  I have Reviewed and summarized old records.  I have performed thorough medication reconciliation today and discussed risk and benefits of each medication.  I have reviewed  labs and discussed with patient.  All questions were answered.    I have signed for the following orders AND/OR meds.  No orders of the defined types were placed in this encounter.    Medications Ordered This Encounter   Medications    atorvastatin (LIPITOR) 20 MG tablet     Sig: Take 1 tablet (20 mg total) by mouth once daily.     Dispense:  90 tablet     Refill:  3    benazepriL (LOTENSIN) 10 MG tablet     Sig: Take 1 tablet (10 mg total) by mouth once daily.     Dispense:  90 tablet     Refill:  3    clopidogreL (PLAVIX) 75 mg tablet     Sig: Take 1 tablet (75 mg total) by mouth once daily.     Dispense:  90 tablet     Refill:  3    omeprazole (PRILOSEC) 20 MG capsule     Sig: Take 1 capsule (20 mg total) by mouth every evening.     Dispense:  90 capsule     Refill:  4    pregabalin (LYRICA) 75 MG capsule     Sig: Take 1 capsule (75 mg total) by mouth 2 (two) times daily.     Dispense:  60 capsule     Refill:  5    traMADoL (ULTRAM) 50 mg tablet     Sig: Take 1 tablet (50 mg total) by mouth every 6 (six) hours as needed.     Dispense:  120 tablet     Refill:  1     Quantity prescribed more than 7 day supply? Yes, quantity medically necessary        Follow up in about 3 months (around 7/24/2020), or if symptoms worsen or fail to improve, for Med refills, LAB RESULTS.    If no improvement in symptoms or symptoms worsen,  advised to call/follow-up at clinic or go to ER. Patient voiced understanding and all questions/concerns were addressed.     DISCLAIMER: This note was compiled by using a speech recognition dictation system and therefore please be aware that typographical / speech recognition errors can and do occur.  Please contact me if you see any errors specifically.    José Manuel Tyler M.D.       Office: [phone]   [address] [place] [address]  FAX: [phone]                       This service was not originating from a related E/M service provided within the previous 7 days nor will  to an E/M service or procedure within the next 24 hours or my soonest available appointment.  Prevailing standard of care was able to be met in this audio-only visit.

## 2020-04-24 NOTE — ASSESSMENT & PLAN NOTE
Patient advised to use cane as soon as possible.  Fall precautions.Patient deferred physical therapy.  Patient is agreeable to a cane but likes to use her oxygen carrier.  Fall precautions.  ER precautions.  Patient advised to increase caloric intake.  See weight loss.

## 2020-04-24 NOTE — Clinical Note
Set up three-month appointment.  Cancel appointment with Dr. Carlos as I have already refill these medications.  Notify patient of canceled appointment and new appointment time and date.

## 2020-05-06 ENCOUNTER — TELEPHONE (OUTPATIENT)
Dept: FAMILY MEDICINE | Facility: CLINIC | Age: 85
End: 2020-05-06

## 2020-05-06 NOTE — TELEPHONE ENCOUNTER
----- Message from Gloria Puente sent at 5/6/2020 12:15 PM CDT -----  Contact: pt   Stated she will like a call back about canceled appointment, she can be reached at [phone] Thanks

## 2020-05-28 ENCOUNTER — TELEPHONE (OUTPATIENT)
Dept: FAMILY MEDICINE | Facility: CLINIC | Age: 85
End: 2020-05-28

## 2020-05-28 NOTE — TELEPHONE ENCOUNTER
Spoke with patient, patient states she has already spoken to her pulmonologist and they have instructed her to go to the ER.

## 2020-05-28 NOTE — TELEPHONE ENCOUNTER
----- Message from Laurie Worley sent at 5/28/2020  8:32 AM CDT -----  Contact: pt  Patient stated that she has lung problems and was told to see Dr. Carlos. The patient would like to get in to see him immediately and doesn't want to schedule an appt. Please call back on mobile number [phone]

## 2020-07-27 ENCOUNTER — TELEPHONE (OUTPATIENT)
Dept: FAMILY MEDICINE | Facility: CLINIC | Age: 85
End: 2020-07-27

## 2020-07-27 ENCOUNTER — OFFICE VISIT (OUTPATIENT)
Dept: FAMILY MEDICINE | Facility: CLINIC | Age: 85
End: 2020-07-27
Payer: MEDICARE

## 2020-07-27 DIAGNOSIS — B37.0 THRUSH: Primary | ICD-10-CM

## 2020-07-27 PROCEDURE — 99442 PR PHYSICIAN TELEPHONE EVALUATION 11-20 MIN: ICD-10-PCS | Mod: 95,,, | Performed by: FAMILY MEDICINE

## 2020-07-27 PROCEDURE — 99442 PR PHYSICIAN TELEPHONE EVALUATION 11-20 MIN: CPT | Mod: 95,,, | Performed by: FAMILY MEDICINE

## 2020-07-27 RX ORDER — NYSTATIN 100000 [USP'U]/ML
4 SUSPENSION ORAL 4 TIMES DAILY
Qty: 160 ML | Refills: 0 | Status: SHIPPED | OUTPATIENT
Start: 2020-07-27 | End: 2020-08-07 | Stop reason: SDUPTHER

## 2020-07-27 NOTE — PROGRESS NOTES
Primary Care Telemedicine Note    The patient location is:  Patient Home - [place]  The chief complaint leading to consultation is:   Chief Complaint   Patient presents with    Oral Pain      Total time spent with patient:  11 min    Visit type: Virtual visit with synchronous audio only .  Patient was unable to use video and this was a telephone only interview.  Each patient to whom he or she provides medical services by telemedicine is:  (1) informed of the relationship between the physician and patient and the respective role of any other health care provider with respect to management of the patient; and (2) notified that he or she may decline to receive medical services by telemedicine and may withdraw from such care at any time.  =================================================================  ==========================================================================  Subjective/Assessment/ Plan:      Patient ID: Tamie Fink is a 89 y.o. female.  has a past medical history of Allergy, Arthritis, Asthma, GERD (gastroesophageal reflux disease), HEARING LOSS, Hypertension, Lung disease, and Pneumonia.   Chief Complaint: Oral Pain    Problem List Items Addressed This Visit     Thrush - Primary    Overview     Acute.  Oral pain.  Associated with inhaler use for her COPD.  Patient thinks it may be thrush.  Patient has not tried any treatment yet.         Current Assessment & Plan     Start nystatin swish and spit and recommend [employer] peroxyl.Discussed condition course and signs and symptoms to expect.  Patient advised take Tylenol for pain or fever.  ER precautions.  Call MD or follow-up to clinic if not improving or worsening symptoms.                I have reviewed the complete PMH, Family History, social history, surgical history, allergies and medications per Epic record at the time of this visit.  Review of Systems see HPI otherwise negative  Objective   There were no vitals taken for this  visit.  Physical Exam limited due to telephone only interview.  Assessment:     1. Thrush      I have signed for the following orders AND/OR meds.  No orders of the defined types were placed in this encounter.    Medications Ordered This Encounter   Medications    nystatin (MYCOSTATIN) 100,000 unit/mL suspension     Sig: Take 4 mLs (400,000 Units total) by mouth 4 (four) times daily. Swish and spit every use for 10 days     Dispense:  160 mL     Refill:  0      Medication List with Changes/Refills   New Medications    NYSTATIN (MYCOSTATIN) 100,000 UNIT/ML SUSPENSION    Take 4 mLs (400,000 Units total) by mouth 4 (four) times daily. Swish and spit every use for 10 days   Current Medications    ALBUTEROL (VENTOLIN HFA) 90 MCG/ACTUATION INHALER    Inhale 2 puffs into the lungs.    ATORVASTATIN (LIPITOR) 20 MG TABLET    Take 1 tablet (20 mg total) by mouth once daily.    BENAZEPRIL (LOTENSIN) 10 MG TABLET    Take 1 tablet (10 mg total) by mouth once daily.    CLOPIDOGREL (PLAVIX) 75 MG TABLET    Take 1 tablet (75 mg total) by mouth once daily.    FLUTICASONE-UMECLIDIN-VILANTER (TRELEGY ELLIPTA) 100-62.5-25 MCG DSDV    Inhale 1 puff into the lungs.    IPRATROPIUM (ATROVENT HFA) 17 MCG/ACTUATION INHALER    Inhale 2 puffs into the lungs every 6 (six) hours. Rescue    MUPIROCIN (BACTROBAN) 2 % OINTMENT    Apply topically 2 (two) times daily.    OMEPRAZOLE (PRILOSEC) 20 MG CAPSULE    Take 1 capsule (20 mg total) by mouth every evening.    PREGABALIN (LYRICA) 75 MG CAPSULE    Take 1 capsule (75 mg total) by mouth 2 (two) times daily.    SENNOSIDES (SENOKOT ORAL)    Take by mouth as needed.    TRAMADOL (ULTRAM) 50 MG TABLET    Take 1 tablet (50 mg total) by mouth every 6 (six) hours as needed.     Follow up if symptoms worsen or fail to improve, for As scheduled.      If no improvement in symptoms or symptoms worsen, advised to call/follow-up at clinic or go to ER. Patient voiced understanding and all questions/concerns were  addressed.   DISCLAIMER: This note was compiled by using a speech recognition dictation system and therefore please be aware that typographical / speech recognition errors can and do occur.  Please contact me if you see any errors specifically.  José Manuel Tyler M.D.

## 2020-07-27 NOTE — TELEPHONE ENCOUNTER
----- Message from Cheri Lynn sent at 7/27/2020 10:13 AM CDT -----  Contact: [place]  Would like to consult with nurse about her mouth is sore please call back [phone]

## 2020-07-28 NOTE — PATIENT INSTRUCTIONS
Follow up if symptoms worsen or fail to improve, for As scheduled.     If no improvement in symptoms or symptoms worsen, please be advised to call MD, follow-up at clinic and/or go to ER if becomes severe.    José Manuel Tyler M.D.        We Offer TELEHEALTH & Same Day Appointments!   Book your Telehealth appointment with me through my nurse or   Clinic appointments on [employer]!    [address] [place] [address]    Office: [phone]   FAX: [phone]    Check out my Facebook Page and Follow Me at: [email or web address]    Check out my website at [employer] by clicking on: [email or web address]    To Schedule appointments online, go to [employer]: [email or web address]

## 2020-08-07 ENCOUNTER — TELEPHONE (OUTPATIENT)
Dept: FAMILY MEDICINE | Facility: CLINIC | Age: 85
End: 2020-08-07

## 2020-08-07 DIAGNOSIS — B37.0 THRUSH: ICD-10-CM

## 2020-08-07 RX ORDER — NYSTATIN 100000 [USP'U]/ML
4 SUSPENSION ORAL 4 TIMES DAILY
Qty: 160 ML | Refills: 0 | Status: SHIPPED | OUTPATIENT
Start: 2020-08-07 | End: 2021-02-26

## 2020-08-07 NOTE — TELEPHONE ENCOUNTER
----- Message from Domenica Soares sent at 8/7/2020 10:28 AM CDT -----  Type:  RX Refill Request    Who Called: patient  Refill or New Rx:refill  RX Name and Strength:Nystatin  How is the patient currently taking it? (ex. 1XDay)na  Is this a 30 day or 90 day RX:na  Preferred Pharmacy with phone number:[facility]  Local or Mail Order:local  Ordering Provider:Dr Tyler  Would the patient rather a call back or a response via [facility]? Fercho back  Best Call Back Number:[phone]  Additional Information: pt states she still have rash in mouth, meds is out

## 2020-08-07 NOTE — TELEPHONE ENCOUNTER
----- Message from Domenica Soares sent at 8/7/2020 11:14 AM CDT -----  Type:  Patient Returning Call    Who Called:Patient  Who Left Message for Patient:Shruthi  Does the patient know what this is regarding?: na  Would the patient rather a call back or a response via [facility]? Call back  Best Call Back Number:[phone]  Additional Information: na

## 2020-10-27 DIAGNOSIS — Z79.899 ENCOUNTER FOR LONG-TERM (CURRENT) USE OF MEDICATIONS: ICD-10-CM

## 2020-10-28 RX ORDER — TRAMADOL HYDROCHLORIDE 50 MG/1
TABLET ORAL
Qty: 120 TABLET | Refills: 1 | OUTPATIENT
Start: 2020-10-28

## 2020-10-28 NOTE — TELEPHONE ENCOUNTER
Attempted to reach patient by phone, unable to leave a message as no voicemail picked up in regards to patient needing an appointment for refill on Tramadol.

## 2020-10-29 ENCOUNTER — OFFICE VISIT (OUTPATIENT)
Dept: FAMILY MEDICINE | Facility: CLINIC | Age: 85
End: 2020-10-29
Payer: MEDICARE

## 2020-10-29 ENCOUNTER — TELEPHONE (OUTPATIENT)
Dept: FAMILY MEDICINE | Facility: CLINIC | Age: 85
End: 2020-10-29

## 2020-10-29 DIAGNOSIS — Z79.899 ENCOUNTER FOR LONG-TERM (CURRENT) USE OF MEDICATIONS: ICD-10-CM

## 2020-10-29 DIAGNOSIS — M15.9 PRIMARY OSTEOARTHRITIS INVOLVING MULTIPLE JOINTS: Primary | ICD-10-CM

## 2020-10-29 DIAGNOSIS — G25.81 RESTLESS LEGS: ICD-10-CM

## 2020-10-29 PROCEDURE — 99442 PR PHYSICIAN TELEPHONE EVALUATION 11-20 MIN: CPT | Mod: 95,,, | Performed by: FAMILY MEDICINE

## 2020-10-29 PROCEDURE — 99442 PR PHYSICIAN TELEPHONE EVALUATION 11-20 MIN: ICD-10-PCS | Mod: 95,,, | Performed by: FAMILY MEDICINE

## 2020-10-29 RX ORDER — TRAMADOL HYDROCHLORIDE 50 MG/1
50 TABLET ORAL EVERY 6 HOURS PRN
Qty: 120 TABLET | Refills: 1 | Status: SHIPPED | OUTPATIENT
Start: 2020-10-29 | End: 2020-12-28

## 2020-10-29 NOTE — PATIENT INSTRUCTIONS
Follow up in about 3 months (around 1/29/2021), or if symptoms worsen or fail to improve, for Med refills, LAB RESULTS.     If no improvement in symptoms or symptoms worsen, please be advised to call MD, follow-up at clinic and/or go to ER if becomes severe.    José Manuel Tyler M.D.        We Offer TELEHEALTH & Same Day Appointments!   Book your Telehealth appointment with me through my nurse or   Clinic appointments on [employer]!    [address] [place] [address]    Office: [phone]   FAX: [phone]    Check out my Facebook Page and Follow Me at: [email or web address]    Check out my website at [employer] by clicking on: [email or web address]    To Schedule appointments online, go to [employer]: [email or web address]

## 2020-10-29 NOTE — ASSESSMENT & PLAN NOTE
Refill tramadol under these COVID restrictions.  Follow-up in office in three months.The patient was checked in the [facility] Board of Pharmacy's  Prescription Monitoring Program. There appears to be no incongruities with the patient's verbalized history.

## 2020-10-29 NOTE — ASSESSMENT & PLAN NOTE
Medication refilled.The patient was checked in the [facility] Board of Pharmacy's  Prescription Monitoring Program. There appears to be no incongruities with the patient's verbalized history.

## 2020-10-29 NOTE — TELEPHONE ENCOUNTER
----- Message from José Manuel Tyler MD sent at 10/29/2020 12:51 PM CDT -----  SetUp three-month follow-up for medication refill.   mail the patient an appointment letter.

## 2020-10-29 NOTE — TELEPHONE ENCOUNTER
3 mo f/u med refill appointment was set up for the patient and an appointment reminder letter was mailed to the address on file.

## 2020-10-29 NOTE — PROGRESS NOTES
Established Patient - Audio Only Telehealth Visit     The patient location is:  Patient's home in [place]  The chief complaint leading to consultation is:  Medication refill  Visit type: Virtual visit with audio only (telephone)  Total time spent with patient:  11 min       The reason for the audio only service rather than synchronous audio and video virtual visit was related to technical difficulties or patient preference/necessity.     Each patient to whom I provide medical services by telemedicine is:  (1) informed of the relationship between the physician and patient and the respective role of any other health care provider with respect to management of the patient; and (2) notified that they may decline to receive medical services by telemedicine and may withdraw from such care at any time. Patient verbally consented to receive this service via voice-only telephone call.  ==========================================================================  Subjective/Assessment/ Plan:      Patient ID: Tamie Fink is a 89 y.o. female.  has a past medical history of Allergy, Arthritis, Asthma, GERD (gastroesophageal reflux disease), HEARING LOSS, Hypertension, Lung disease, and Pneumonia.   Chief Complaint: Medication Refill    Problem List Items Addressed This Visit     Encounter for long-term (current) use of medications (Chronic)    Overview     Initial HPI March 2020:  CHRONIC. Stable. Compliant with medications for managed conditions. See medication list. No SE reported.   Routine lab analysis is being monitored. Refills were addressed.  ==================================================  OCTOBER 2020:    CHRONIC. Stable. Compliant with medications for managed conditions. See medication list. No SE reported.   Routine lab analysis is being monitored. Refills were addressed.  Lab Results   Component Value Date    WBC 8.30 06/13/2017    HGB 11.6 (L) 06/13/2017    HCT 38.1 06/13/2017    MCV 97 06/13/2017    PLT  239 06/13/2017         Chemistry        Component Value Date/Time     06/13/2017 1449    K 4.6 06/13/2017 1449     06/13/2017 1449    CO2 31 (H) 06/13/2017 1449    BUN 20 06/13/2017 1449    CREATININE 0.9 06/13/2017 1449    GLU 90 06/13/2017 1449        Component Value Date/Time    CALCIUM 9.4 06/13/2017 1449    ALKPHOS 83 06/13/2017 1449    AST 15 06/13/2017 1449    ALT 7 (L) 06/13/2017 1449    BILITOT 0.2 06/13/2017 1449    ESTGFRAFRICA >60.0 06/13/2017 1449    EGFRNONAA 58.1 (A) 06/13/2017 1449          Lab Results   Component Value Date    TSH 1.237 06/13/2017    W6ILKFA 5.8 12/03/2014    FREET4 0.94 02/11/2015    T3FREE 2.8 02/11/2015       =================================================         Current Assessment & Plan     Reviewed labs.  Patient needs updated lab work prior to next appointment.         Degenerative joint disease involving multiple joints - Primary    Overview     Chronic.  Intermittent control.  Patient is maintained on tramadol 50 mg as needed once or twice per day and Lyrica.  Patient reports compliance.  No side effects reported.  Patient a refill.  Patient is currently having transportation issues due to COVID restriction.         Current Assessment & Plan     Refill tramadol under these COVID restrictions.  Follow-up in office in three months.The patient was checked in the [facility]acy's  Prescription Monitoring Program. There appears to be no incongruities with the patient's verbalized history.            Restless legs    Overview     Chronic.  Intermittent control.  Patient reports tramadol also helps her for her restless legs.  Patient takes one tablet at bedtime.  Reports compliance.  No side effects reported.  No abuse suspected.   reviewed.         Current Assessment & Plan     Medication refilled.The patient was checked in the [place] [employer] of [employer]'s  Prescription Monitoring Program. There appears to be no incongruities with the  patient's verbalized history.                 I have reviewed the complete PMH, Family History, social history, surgical history, allergies and medications per Epic record at the time of this visit.  Review of Systems see HPI otherwise negative  Objective   There were no vitals taken for this visit.  Physical Exam limited due to telephone only interview.  Patient is alert, no acute distress.  Assessment:     1. Primary osteoarthritis involving multiple joints    2. Encounter for long-term (current) use of medications    3. Restless legs      I have signed for the following orders AND/OR meds.  No orders of the defined types were placed in this encounter.    Medications Ordered This Encounter   Medications    traMADoL (ULTRAM) 50 mg tablet     Sig: Take 1 tablet (50 mg total) by mouth every 6 (six) hours as needed.     Dispense:  120 tablet     Refill:  1     Quantity prescribed more than 7 day supply? Yes, quantity medically necessary      Medication List with Changes/Refills   Current Medications    ALBUTEROL (VENTOLIN HFA) 90 MCG/ACTUATION INHALER    Inhale 2 puffs into the lungs.    ATORVASTATIN (LIPITOR) 20 MG TABLET    Take 1 tablet (20 mg total) by mouth once daily.    BENAZEPRIL (LOTENSIN) 10 MG TABLET    Take 1 tablet (10 mg total) by mouth once daily.    CLOPIDOGREL (PLAVIX) 75 MG TABLET    Take 1 tablet (75 mg total) by mouth once daily.    FLUTICASONE-UMECLIDIN-VILANTER (TRELEGY ELLIPTA) 100-62.5-25 MCG DSDV    Inhale 1 puff into the lungs.    IPRATROPIUM (ATROVENT HFA) 17 MCG/ACTUATION INHALER    Inhale 2 puffs into the lungs every 6 (six) hours. Rescue    MUPIROCIN (BACTROBAN) 2 % OINTMENT    Apply topically 2 (two) times daily.    OMEPRAZOLE (PRILOSEC) 20 MG CAPSULE    Take 1 capsule (20 mg total) by mouth every evening.    OXYGEN-AIR DELIVERY SYSTEMS MISC    by Miscellaneous route Use as directed  2 L at home 3 L on ambulation.  MFallerLPN    PREGABALIN (LYRICA) 75 MG CAPSULE    Take 1 capsule (75 mg  total) by mouth 2 (two) times daily.    SENNOSIDES (SENOKOT ORAL)    Take by mouth as needed.   Changed and/or Refilled Medications    Modified Medication Previous Medication    TRAMADOL (ULTRAM) 50 MG TABLET traMADoL (ULTRAM) 50 mg tablet       Take 1 tablet (50 mg total) by mouth every 6 (six) hours as needed.    Take 1 tablet (50 mg total) by mouth every 6 (six) hours as needed.     Follow up in about 3 months (around 1/29/2021), or if symptoms worsen or fail to improve, for Med refills, LAB RESULTS.      If no improvement in symptoms or symptoms worsen, advised to call/follow-up at clinic or go to ER. Patient voiced understanding and all questions/concerns were addressed.   DISCLAIMER: This note was compiled by using a speech recognition dictation system and therefore please be aware that typographical / speech recognition errors can and do occur.  Please contact me if you see any errors specifically.  José Manuel Tyler M.D.                      This service was not originating from a related E/M service provided within the previous 7 days nor will  to an E/M service or procedure within the next 24 hours or my soonest available appointment.  Prevailing standard of care was able to be met in this audio-only visit.

## 2020-10-29 NOTE — TELEPHONE ENCOUNTER
----- Message from Fede Frazier sent at 10/29/2020  8:40 AM CDT -----  Contact: self  Type:  Sooner Apoointment Request    Caller is requesting a sooner appointment.  Caller declined first available appointment listed below.  Caller will not accept being placed on the waitlist and is requesting a message be sent to doctor.  Name of Caller:Tamie Fink   When is the first available appointment?2020  Symptoms:not feeling well and medication refilled  Would the patient rather a call back or a response via [facility]? Call back  Best Call Back Number: [phone]     Additional Information:

## 2020-12-04 NOTE — LETTER
June 20, 2017      Amador Carlos MD  [id number] [facility] [address]           [facility]  [address] [place] [address]  Phone: [phone]  Fax: [phone]          Patient: Tamie Fink   MR Number: [id number]   YOB: 1931   Date of Visit: 6/20/2017       Dear Dr. Amador Carlos:    Thank you for referring Tamie Fink to me for evaluation. Attached you will find relevant portions of my assessment and plan of care.    If you have questions, please do not hesitate to call me. I look forward to following Tamie Fink along with you.    Sincerely,    Morena Bradshaw PA-C    Enclosure  CC:  No Recipients    If you would like to receive this communication electronically, please contact [email or web address] or [phone] to request more information on [employer] Link access.    For providers and/or their staff who would like to refer a patient to [facility], please contact us through our one-stop-shop provider referral line, Shawn Ramírez, at [phone].    If you feel you have received this communication in error or would no longer like to receive these types of communications, please e-mail [email or web address]          Spoke to mom xray shows constipation discussed doctors advice with mom

## 2021-01-04 ENCOUNTER — TELEPHONE (OUTPATIENT)
Dept: FAMILY MEDICINE | Facility: CLINIC | Age: 86
End: 2021-01-04

## 2021-01-05 ENCOUNTER — OFFICE VISIT (OUTPATIENT)
Dept: FAMILY MEDICINE | Facility: CLINIC | Age: 86
End: 2021-01-05
Payer: MEDICARE

## 2021-01-05 VITALS
SYSTOLIC BLOOD PRESSURE: 134 MMHG | RESPIRATION RATE: 18 BRPM | DIASTOLIC BLOOD PRESSURE: 63 MMHG | WEIGHT: 97 LBS | HEIGHT: 64 IN | HEART RATE: 100 BPM | BODY MASS INDEX: 16.56 KG/M2

## 2021-01-05 DIAGNOSIS — M54.32 SCIATICA OF LEFT SIDE: Primary | ICD-10-CM

## 2021-01-05 PROCEDURE — 1125F PR PAIN SEVERITY QUANTIFIED, PAIN PRESENT: ICD-10-PCS | Mod: S$GLB,,, | Performed by: FAMILY MEDICINE

## 2021-01-05 PROCEDURE — 1159F PR MEDICATION LIST DOCUMENTED IN MEDICAL RECORD: ICD-10-PCS | Mod: S$GLB,,, | Performed by: FAMILY MEDICINE

## 2021-01-05 PROCEDURE — 3288F FALL RISK ASSESSMENT DOCD: CPT | Mod: CPTII,S$GLB,, | Performed by: FAMILY MEDICINE

## 2021-01-05 PROCEDURE — 1100F PR PT FALLS ASSESS DOC 2+ FALLS/FALL W/INJURY/YR: ICD-10-PCS | Mod: CPTII,S$GLB,, | Performed by: FAMILY MEDICINE

## 2021-01-05 PROCEDURE — 99999 PR PBB SHADOW E&M-EST. PATIENT-LVL III: ICD-10-PCS | Mod: PBBFAC,,, | Performed by: FAMILY MEDICINE

## 2021-01-05 PROCEDURE — 3288F PR FALLS RISK ASSESSMENT DOCUMENTED: ICD-10-PCS | Mod: CPTII,S$GLB,, | Performed by: FAMILY MEDICINE

## 2021-01-05 PROCEDURE — 96372 THER/PROPH/DIAG INJ SC/IM: CPT | Mod: S$GLB,,, | Performed by: FAMILY MEDICINE

## 2021-01-05 PROCEDURE — 96372 PR INJECTION,THERAP/PROPH/DIAG2ST, IM OR SUBCUT: ICD-10-PCS | Mod: S$GLB,,, | Performed by: FAMILY MEDICINE

## 2021-01-05 PROCEDURE — 99213 PR OFFICE/OUTPT VISIT, EST, LEVL III, 20-29 MIN: ICD-10-PCS | Mod: 25,S$GLB,, | Performed by: FAMILY MEDICINE

## 2021-01-05 PROCEDURE — 1159F MED LIST DOCD IN RCRD: CPT | Mod: S$GLB,,, | Performed by: FAMILY MEDICINE

## 2021-01-05 PROCEDURE — 99213 OFFICE O/P EST LOW 20 MIN: CPT | Mod: 25,S$GLB,, | Performed by: FAMILY MEDICINE

## 2021-01-05 PROCEDURE — 99999 PR PBB SHADOW E&M-EST. PATIENT-LVL III: CPT | Mod: PBBFAC,,, | Performed by: FAMILY MEDICINE

## 2021-01-05 PROCEDURE — 1100F PTFALLS ASSESS-DOCD GE2>/YR: CPT | Mod: CPTII,S$GLB,, | Performed by: FAMILY MEDICINE

## 2021-01-05 PROCEDURE — 1125F AMNT PAIN NOTED PAIN PRSNT: CPT | Mod: S$GLB,,, | Performed by: FAMILY MEDICINE

## 2021-01-05 RX ORDER — DEXAMETHASONE SODIUM PHOSPHATE 4 MG/ML
8 INJECTION, SOLUTION INTRA-ARTICULAR; INTRALESIONAL; INTRAMUSCULAR; INTRAVENOUS; SOFT TISSUE ONCE
Status: COMPLETED | OUTPATIENT
Start: 2021-01-05 | End: 2021-01-05

## 2021-01-05 RX ADMIN — DEXAMETHASONE SODIUM PHOSPHATE 8 MG: 4 INJECTION, SOLUTION INTRA-ARTICULAR; INTRALESIONAL; INTRAMUSCULAR; INTRAVENOUS; SOFT TISSUE at 11:01

## 2021-01-12 ENCOUNTER — TELEPHONE (OUTPATIENT)
Dept: FAMILY MEDICINE | Facility: CLINIC | Age: 86
End: 2021-01-12

## 2021-01-12 DIAGNOSIS — M25.552 LEFT HIP PAIN: ICD-10-CM

## 2021-01-12 DIAGNOSIS — M54.32 SCIATICA OF LEFT SIDE: Primary | ICD-10-CM

## 2021-01-12 RX ORDER — DICLOFENAC SODIUM 50 MG/1
50 TABLET, DELAYED RELEASE ORAL 2 TIMES DAILY PRN
Qty: 20 TABLET | Refills: 0 | Status: SHIPPED | OUTPATIENT
Start: 2021-01-12 | End: 2021-01-22

## 2021-01-26 ENCOUNTER — OFFICE VISIT (OUTPATIENT)
Dept: FAMILY MEDICINE | Facility: CLINIC | Age: 86
End: 2021-01-26
Payer: MEDICARE

## 2021-01-26 ENCOUNTER — HOSPITAL ENCOUNTER (OUTPATIENT)
Dept: RADIOLOGY | Facility: HOSPITAL | Age: 86
Discharge: HOME | End: 2021-01-26
Attending: FAMILY MEDICINE
Payer: MEDICARE

## 2021-01-26 VITALS
WEIGHT: 104.13 LBS | HEIGHT: 64 IN | SYSTOLIC BLOOD PRESSURE: 138 MMHG | HEART RATE: 101 BPM | DIASTOLIC BLOOD PRESSURE: 64 MMHG | BODY MASS INDEX: 17.78 KG/M2 | TEMPERATURE: 99 F

## 2021-01-26 DIAGNOSIS — Z13.6 ENCOUNTER FOR LIPID SCREENING FOR CARDIOVASCULAR DISEASE: ICD-10-CM

## 2021-01-26 DIAGNOSIS — Z13.220 ENCOUNTER FOR LIPID SCREENING FOR CARDIOVASCULAR DISEASE: ICD-10-CM

## 2021-01-26 DIAGNOSIS — Z00.00 WELL ADULT EXAM: Primary | ICD-10-CM

## 2021-01-26 DIAGNOSIS — G89.29 CHRONIC HIP PAIN, UNSPECIFIED LATERALITY: ICD-10-CM

## 2021-01-26 DIAGNOSIS — M25.552 LEFT HIP PAIN: ICD-10-CM

## 2021-01-26 DIAGNOSIS — M25.559 CHRONIC HIP PAIN, UNSPECIFIED LATERALITY: ICD-10-CM

## 2021-01-26 DIAGNOSIS — M54.32 SCIATICA OF LEFT SIDE: ICD-10-CM

## 2021-01-26 DIAGNOSIS — Z79.899 ENCOUNTER FOR LONG-TERM (CURRENT) USE OF MEDICATIONS: ICD-10-CM

## 2021-01-26 PROCEDURE — 1125F PR PAIN SEVERITY QUANTIFIED, PAIN PRESENT: ICD-10-PCS | Mod: S$GLB,,, | Performed by: FAMILY MEDICINE

## 2021-01-26 PROCEDURE — 99999 PR PBB SHADOW E&M-EST. PATIENT-LVL III: ICD-10-PCS | Mod: PBBFAC,,, | Performed by: FAMILY MEDICINE

## 2021-01-26 PROCEDURE — 1101F PR PT FALLS ASSESS DOC 0-1 FALLS W/OUT INJ PAST YR: ICD-10-PCS | Mod: CPTII,S$GLB,, | Performed by: FAMILY MEDICINE

## 2021-01-26 PROCEDURE — 1101F PT FALLS ASSESS-DOCD LE1/YR: CPT | Mod: CPTII,S$GLB,, | Performed by: FAMILY MEDICINE

## 2021-01-26 PROCEDURE — 3288F FALL RISK ASSESSMENT DOCD: CPT | Mod: CPTII,S$GLB,, | Performed by: FAMILY MEDICINE

## 2021-01-26 PROCEDURE — 1125F AMNT PAIN NOTED PAIN PRSNT: CPT | Mod: S$GLB,,, | Performed by: FAMILY MEDICINE

## 2021-01-26 PROCEDURE — 99214 OFFICE O/P EST MOD 30 MIN: CPT | Mod: S$GLB,,, | Performed by: FAMILY MEDICINE

## 2021-01-26 PROCEDURE — 3288F PR FALLS RISK ASSESSMENT DOCUMENTED: ICD-10-PCS | Mod: CPTII,S$GLB,, | Performed by: FAMILY MEDICINE

## 2021-01-26 PROCEDURE — 73502 X-RAY EXAM HIP UNI 2-3 VIEWS: CPT | Mod: TC,PO,LT

## 2021-01-26 PROCEDURE — 73502 XR HIP 2 VIEW LEFT: ICD-10-PCS | Mod: 26,LT,, | Performed by: RADIOLOGY

## 2021-01-26 PROCEDURE — 99214 PR OFFICE/OUTPT VISIT, EST, LEVL IV, 30-39 MIN: ICD-10-PCS | Mod: S$GLB,,, | Performed by: FAMILY MEDICINE

## 2021-01-26 PROCEDURE — 99999 PR PBB SHADOW E&M-EST. PATIENT-LVL III: CPT | Mod: PBBFAC,,, | Performed by: FAMILY MEDICINE

## 2021-01-26 PROCEDURE — 73502 X-RAY EXAM HIP UNI 2-3 VIEWS: CPT | Mod: 26,LT,, | Performed by: RADIOLOGY

## 2021-01-26 RX ORDER — TRAMADOL HYDROCHLORIDE 50 MG/1
50 TABLET ORAL EVERY 6 HOURS PRN
Qty: 120 TABLET | Refills: 1 | Status: SHIPPED | OUTPATIENT
Start: 2021-01-26 | End: 2021-06-30

## 2021-01-27 DIAGNOSIS — D53.9 NUTRITIONAL ANEMIA: ICD-10-CM

## 2021-01-27 DIAGNOSIS — D64.9 ANEMIA, UNSPECIFIED TYPE: Primary | ICD-10-CM

## 2021-02-01 ENCOUNTER — TELEPHONE (OUTPATIENT)
Dept: FAMILY MEDICINE | Facility: CLINIC | Age: 86
End: 2021-02-01

## 2021-03-23 ENCOUNTER — LAB VISIT (OUTPATIENT)
Dept: LAB | Facility: HOSPITAL | Age: 86
End: 2021-03-23
Attending: FAMILY MEDICINE
Payer: MEDICARE

## 2021-03-23 DIAGNOSIS — D64.9 ANEMIA, UNSPECIFIED TYPE: ICD-10-CM

## 2021-03-23 DIAGNOSIS — D53.9 NUTRITIONAL ANEMIA: ICD-10-CM

## 2021-03-23 LAB
BASOPHILS # BLD AUTO: 0.05 K/UL (ref 0–0.2)
BASOPHILS NFR BLD: 0.6 % (ref 0–1.9)
DIFFERENTIAL METHOD: ABNORMAL
EOSINOPHIL # BLD AUTO: 0.2 K/UL (ref 0–0.5)
EOSINOPHIL NFR BLD: 2.4 % (ref 0–8)
ERYTHROCYTE [DISTWIDTH] IN BLOOD BY AUTOMATED COUNT: 15.6 % (ref 11.5–14.5)
FERRITIN SERPL-MCNC: 69 NG/ML (ref 20–300)
HCT VFR BLD AUTO: 32.9 % (ref 37–48.5)
HGB BLD-MCNC: 9.8 G/DL (ref 12–16)
IMM GRANULOCYTES # BLD AUTO: 0.02 K/UL (ref 0–0.04)
IMM GRANULOCYTES NFR BLD AUTO: 0.3 % (ref 0–0.5)
IRON SERPL-MCNC: 38 UG/DL (ref 30–160)
LYMPHOCYTES # BLD AUTO: 1.8 K/UL (ref 1–4.8)
LYMPHOCYTES NFR BLD: 23 % (ref 18–48)
MCH RBC QN AUTO: 28.5 PG (ref 27–31)
MCHC RBC AUTO-ENTMCNC: 29.8 G/DL (ref 32–36)
MCV RBC AUTO: 96 FL (ref 82–98)
MONOCYTES # BLD AUTO: 0.8 K/UL (ref 0.3–1)
MONOCYTES NFR BLD: 9.9 % (ref 4–15)
NEUTROPHILS # BLD AUTO: 5 K/UL (ref 1.8–7.7)
NEUTROPHILS NFR BLD: 63.8 % (ref 38–73)
NRBC BLD-RTO: 0 /100 WBC
PLATELET # BLD AUTO: 250 K/UL (ref 150–350)
PMV BLD AUTO: 11.9 FL (ref 9.2–12.9)
RBC # BLD AUTO: 3.44 M/UL (ref 4–5.4)
RETICS/RBC NFR AUTO: 1.4 % (ref 0.5–2.5)
SATURATED IRON: 11 % (ref 20–50)
TOTAL IRON BINDING CAPACITY: 345 UG/DL (ref 250–450)
TRANSFERRIN SERPL-MCNC: 233 MG/DL (ref 200–375)
VIT B12 SERPL-MCNC: 735 PG/ML (ref 210–950)
WBC # BLD AUTO: 7.9 K/UL (ref 3.9–12.7)

## 2021-03-23 PROCEDURE — 85025 COMPLETE CBC W/AUTO DIFF WBC: CPT | Performed by: FAMILY MEDICINE

## 2021-03-23 PROCEDURE — 82607 VITAMIN B-12: CPT | Performed by: FAMILY MEDICINE

## 2021-03-23 PROCEDURE — 36415 COLL VENOUS BLD VENIPUNCTURE: CPT | Mod: PO | Performed by: FAMILY MEDICINE

## 2021-03-23 PROCEDURE — 85045 AUTOMATED RETICULOCYTE COUNT: CPT | Performed by: FAMILY MEDICINE

## 2021-03-23 PROCEDURE — 83540 ASSAY OF IRON: CPT | Performed by: FAMILY MEDICINE

## 2021-03-23 PROCEDURE — 82728 ASSAY OF FERRITIN: CPT | Performed by: FAMILY MEDICINE

## 2021-03-24 DIAGNOSIS — D50.9 IRON DEFICIENCY ANEMIA, UNSPECIFIED IRON DEFICIENCY ANEMIA TYPE: Primary | ICD-10-CM

## 2021-03-24 RX ORDER — FERROUS SULFATE 325(65) MG
325 TABLET ORAL
Qty: 30 TABLET | Refills: 11 | Status: SHIPPED | OUTPATIENT
Start: 2021-03-24 | End: 2022-03-24

## 2021-05-07 DIAGNOSIS — Z79.899 ENCOUNTER FOR LONG-TERM (CURRENT) USE OF MEDICATIONS: ICD-10-CM

## 2021-05-07 RX ORDER — BENAZEPRIL HYDROCHLORIDE 10 MG/1
10 TABLET ORAL DAILY
Qty: 90 TABLET | Refills: 4 | Status: SHIPPED | OUTPATIENT
Start: 2021-05-07 | End: 2022-01-14 | Stop reason: SDUPTHER

## 2021-06-30 DIAGNOSIS — Z79.899 ENCOUNTER FOR LONG-TERM (CURRENT) USE OF MEDICATIONS: ICD-10-CM

## 2021-06-30 RX ORDER — OMEPRAZOLE 20 MG/1
20 CAPSULE, DELAYED RELEASE ORAL NIGHTLY
Qty: 90 CAPSULE | Refills: 4 | Status: SHIPPED | OUTPATIENT
Start: 2021-06-30 | End: 2021-10-13

## 2021-06-30 RX ORDER — TRAMADOL HYDROCHLORIDE 50 MG/1
TABLET ORAL
Qty: 120 TABLET | Refills: 1 | Status: SHIPPED | OUTPATIENT
Start: 2021-06-30 | End: 2022-01-14 | Stop reason: SDUPTHER

## 2021-07-19 NOTE — TELEPHONE ENCOUNTER
Subjective:     Caryl Lutz is a 3 y o  female who is brought in for this well child visit  History provided by: mother    Current Issues:  Current concerns:  May use hydrocortisone 1% cream 3 times a day on the areas of the eczema, no  longer than 3 weeks  Advised about environmental control  If there is no improvement to let now    Well Child Assessment:  History was provided by the mother  Jose Iniguez lives with her mother, father and brother  Interval problems do not include caregiver depression, caregiver stress, chronic stress at home, lack of social support, marital discord, recent illness or recent injury  Nutrition  Types of intake include eggs, fruits, junk food, meats, vegetables, fish, cereals and cow's milk  Dental  The patient does not have a dental home  Elimination  Elimination problems do not include constipation  Behavioral  Disciplinary methods include consistency among caregivers  Sleep  The patient sleeps in her crib  Average sleep duration is 15 hours  There are no sleep problems  Safety  Home is child-proofed? yes  There is no smoking in the home  Home has working smoke alarms? yes  There is an appropriate car seat in use  Screening  Immunizations are up-to-date  There are no risk factors for hearing loss  There are no risk factors for anemia  There are no risk factors for tuberculosis  There are no risk factors for apnea         The following portions of the patient's history were reviewed and updated as appropriate: allergies, current medications, past family history, past medical history, past social history, past surgical history and problem list     Developmental 18 Months Appropriate     Questions Responses    If ball is rolled toward child, child will roll it back (not hand it back) Yes    Comment: Yes on 1/18/2021 (Age - 19mo)     Can drink from a regular cup (not one with a spout) without spilling Yes    Comment: Yes on 1/18/2021 (Age - 19mo)            M-MADALYN-R appt booked and mailed   Most Recent Value   If you point at something across the room, does your child look at it? Yes   Have you ever wondered if your child might be deaf? No   Does your child play pretend or make-believe? Yes   Does your child like climbing on things? Yes   Does your child make unusual finger movements near his or her eyes? No   Does your child point with one finger to ask for something or to get help? Yes   Does your child point with one finger to show you something interesting? Yes   Is your child interested in other children? Yes   Does your child show you things by bringing them to you or holding them up for you to see - not to get help, but just to share? Yes   Does your child respond when you call his or her name? Yes   When you smile at your child, does he or she smile back at you? Yes   Does your child get upset by everyday noises? No   Does your child walk? Yes   Does your child look you in the eye when you are talking to him or her, playing with him or her, or dressing him or her? Yes   Does your child try to copy what you do? Yes   If you turn your head to look at something, does your child look around to see what you are looking at? Yes   Does your child try to get you to watch him or her? Yes   Does your child understand when you tell him or her to do something? Yes   If something new happens, does your child look at your face to see how you feel about it? Yes   Does your child like movement activities? Yes   M-CHAT-R Score  0               Objective:        Growth parameters are noted and are appropriate for age  Wt Readings from Last 1 Encounters:   07/19/21 12 7 kg (28 lb) (63 %, Z= 0 33)*     * Growth percentiles are based on CDC (Girls, 2-20 Years) data  Ht Readings from Last 1 Encounters:   07/19/21 2' 9 5" (0 851 m) (39 %, Z= -0 27)*     * Growth percentiles are based on CDC (Girls, 2-20 Years) data        Head Circumference: 48 9 cm (19 25")    Vitals:    07/19/21 0908 Pulse: 98   Temp: 98 1 °F (36 7 °C)   TempSrc: Temporal   Weight: 12 7 kg (28 lb)   Height: 2' 9 5" (0 851 m)   HC: 48 9 cm (19 25")       Physical Exam  Vitals reviewed  Constitutional:       General: She is active  HENT:      Head: Normocephalic  Right Ear: Tympanic membrane normal       Left Ear: Tympanic membrane normal       Nose: Nose normal       Mouth/Throat:      Mouth: Mucous membranes are moist    Eyes:      Extraocular Movements: Extraocular movements intact  Pupils: Pupils are equal, round, and reactive to light  Cardiovascular:      Rate and Rhythm: Normal rate and regular rhythm  Pulses: Normal pulses  Heart sounds: Normal heart sounds  Pulmonary:      Effort: Pulmonary effort is normal       Breath sounds: Normal breath sounds  Abdominal:      General: Abdomen is flat  Palpations: Abdomen is soft  There is no mass  Genitourinary:     General: Normal vulva  Vagina: No vaginal discharge  Musculoskeletal:         General: Normal range of motion  Cervical back: Normal range of motion and neck supple  Skin:         Neurological:      General: No focal deficit present  Mental Status: She is alert  Assessment:      Healthy 2 y o  female Child  1  Encounter for autism screening            Plan:          1  Anticipatory guidance: Gave handout on well-child issues at this age  2  Screening tests:    a  Lead level: not applicable      b  Hb or HCT: not indicated     3  Immunizations today: none  Vaccine Counseling: Discussed with: Ped parent/guardian: mother  4  Follow-up visit in 6 months for next well child visit, or sooner as needed

## 2021-07-26 ENCOUNTER — PATIENT MESSAGE (OUTPATIENT)
Dept: FAMILY MEDICINE | Facility: CLINIC | Age: 86
End: 2021-07-26

## 2021-08-17 ENCOUNTER — PATIENT MESSAGE (OUTPATIENT)
Dept: FAMILY MEDICINE | Facility: CLINIC | Age: 86
End: 2021-08-17

## 2021-11-29 ENCOUNTER — TELEPHONE (OUTPATIENT)
Dept: ADMINISTRATIVE | Facility: HOSPITAL | Age: 86
End: 2021-11-29
Payer: MEDICARE

## 2021-12-22 DIAGNOSIS — B37.0 THRUSH: ICD-10-CM

## 2021-12-22 RX ORDER — NYSTATIN 100000 [USP'U]/ML
SUSPENSION ORAL
Qty: 160 ML | Refills: 0 | Status: SHIPPED | OUTPATIENT
Start: 2021-12-22 | End: 2022-02-11

## 2022-01-10 ENCOUNTER — HOSPITAL ENCOUNTER (OUTPATIENT)
Dept: RADIOLOGY | Facility: HOSPITAL | Age: 87
Discharge: HOME | End: 2022-01-10
Attending: FAMILY MEDICINE
Payer: MEDICARE

## 2022-01-10 ENCOUNTER — TELEPHONE (OUTPATIENT)
Dept: FAMILY MEDICINE | Facility: CLINIC | Age: 87
End: 2022-01-10
Payer: MEDICARE
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
## 2022-01-10 DIAGNOSIS — R07.81 RIB PAIN: ICD-10-CM
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
## 2022-01-10 PROCEDURE — 71046 X-RAY EXAM CHEST 2 VIEWS: CPT | Mod: TC,PO

## 2022-01-10 PROCEDURE — 71110 XR RIBS 3 VIEWS BILATERAL: ICD-10-PCS | Mod: 26,,, | Performed by: RADIOLOGY

## 2022-01-10 PROCEDURE — 71110 X-RAY EXAM RIBS BIL 3 VIEWS: CPT | Mod: TC,PO

## 2022-01-10 PROCEDURE — 71110 X-RAY EXAM RIBS BIL 3 VIEWS: CPT | Mod: 26,,, | Performed by: RADIOLOGY

## 2022-01-10 PROCEDURE — 71046 XR CHEST PA AND LATERAL: ICD-10-PCS | Mod: 26,,, | Performed by: RADIOLOGY

## 2022-01-10 PROCEDURE — 71046 X-RAY EXAM CHEST 2 VIEWS: CPT | Mod: 26,,, | Performed by: RADIOLOGY

## 2022-01-10 NOTE — PROGRESS NOTES
1st check to see if patient has seen the results.  If not then  CALL patient with results and Document verification.  Schedule follow-up if needed.  [phone]  No fracture on x-rays of the chest and rib.  There are new findings when compared to chest x-ray from 2013. Patient should follow up with us if symptoms are not improving.  Further evaluation with CT scan may be needed.

## 2022-01-10 NOTE — TELEPHONE ENCOUNTER
I have signed for the following orders AND/OR meds.  Please call the patient and ask the patient to schedule the testing AND/OR inform about any medications that were sent.      Orders Placed This Encounter   Procedures    X-Ray Ribs 2 View Left     Standing Status:   Future     Standing Expiration Date:   1/10/2023     Order Specific Question:   May the Radiologist modify the order per protocol to meet the clinical needs of the patient?     Answer:   Yes     Order Specific Question:   Release to patient     Answer:   Immediate    X-Ray Ribs 2 View Right     Standing Status:   Future     Standing Expiration Date:   1/10/2023     Order Specific Question:   May the Radiologist modify the order per protocol to meet the clinical needs of the patient?     Answer:   Yes     Order Specific Question:   Release to patient     Answer:   Immediate    X-Ray Chest PA And Lateral     Standing Status:   Future     Standing Expiration Date:   1/10/2023     Order Specific Question:   May the Radiologist modify the order per protocol to meet the clinical needs of the patient?     Answer:   Yes     Order Specific Question:   Release to patient     Answer:   Immediate

## 2022-01-10 NOTE — PROGRESS NOTES
1st check to see if patient has seen the results.  If not then  CALL patient with results and Document verification.  Schedule follow-up if needed.  [phone]  No fracture on x-rays of the chest and rib.  There are new findings when compared to chest x-ray from 2013. Patient should follow up with us if symptoms are not improving.  Further evaluation with CT scan may be needed.

## 2022-01-10 NOTE — TELEPHONE ENCOUNTER
----- Message from Mark Raisa sent at 1/10/2022  9:55 AM CST -----  Contact: Pt [phone]  Consult    The patient fell last Tuesday and her ribs hurt and she feels short of breath, and it hurts when she coughs. She wants to know if you can order an exray before she sees you on 1/14/22 or if you want to see her before then.    Thank you

## 2022-01-12 ENCOUNTER — TELEPHONE (OUTPATIENT)
Dept: FAMILY MEDICINE | Facility: CLINIC | Age: 87
End: 2022-01-12
Payer: MEDICARE

## 2022-01-12 NOTE — TELEPHONE ENCOUNTER
----- Message from Lynnette Gamboa sent at 1/12/2022  2:32 PM CST -----  Contact: [phone]/ Laura Friar/daughter  Patient need a copy of the chest x ray that was done on 1/10/22 to go to Dr. MEEHAN  for her appointment at [facility]. Patient daughter would like to come and pick it up. Please call and advise. Patient daughter Laura want the film not a paper copy.

## 2022-01-12 NOTE — TELEPHONE ENCOUNTER
Spoke with patient's daughter to let her know that we are not able to give her the xray films here, but Dr. Flores will be able to view the films in Epic.

## 2022-01-14 ENCOUNTER — LAB VISIT (OUTPATIENT)
Dept: LAB | Facility: HOSPITAL | Age: 87
End: 2022-01-14
Attending: FAMILY MEDICINE
Payer: MEDICARE

## 2022-01-14 ENCOUNTER — OFFICE VISIT (OUTPATIENT)
Dept: FAMILY MEDICINE | Facility: CLINIC | Age: 87
End: 2022-01-14
Payer: MEDICARE

## 2022-01-14 VITALS
HEART RATE: 107 BPM | DIASTOLIC BLOOD PRESSURE: 60 MMHG | SYSTOLIC BLOOD PRESSURE: 130 MMHG | TEMPERATURE: 99 F | BODY MASS INDEX: 14.94 KG/M2 | HEIGHT: 64 IN | WEIGHT: 87.5 LBS | OXYGEN SATURATION: 90 % | RESPIRATION RATE: 18 BRPM

## 2022-01-14 DIAGNOSIS — G47.10 HYPERSOMNOLENCE: ICD-10-CM

## 2022-01-14 DIAGNOSIS — Z13.6 ENCOUNTER FOR LIPID SCREENING FOR CARDIOVASCULAR DISEASE: ICD-10-CM
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
## 2022-01-14 DIAGNOSIS — D50.9 IRON DEFICIENCY ANEMIA, UNSPECIFIED IRON DEFICIENCY ANEMIA TYPE: ICD-10-CM

## 2022-01-14 DIAGNOSIS — Z13.220 ENCOUNTER FOR LIPID SCREENING FOR CARDIOVASCULAR DISEASE: ICD-10-CM

## 2022-01-14 DIAGNOSIS — R07.81 RIB PAIN: ICD-10-CM

## 2022-01-14 DIAGNOSIS — Z79.899 ENCOUNTER FOR LONG-TERM (CURRENT) USE OF MEDICATIONS: ICD-10-CM

## 2022-01-14 DIAGNOSIS — R82.998 DARK URINE: ICD-10-CM

## 2022-01-14 DIAGNOSIS — Z00.00 WELL ADULT EXAM: Primary | ICD-10-CM

## 2022-01-14 DIAGNOSIS — Z99.81 OXYGEN DEPENDENT: ICD-10-CM

## 2022-01-14 DIAGNOSIS — D69.2 OTHER NONTHROMBOCYTOPENIC PURPURA: ICD-10-CM

## 2022-01-14 DIAGNOSIS — I42.1 HOCM (HYPERTROPHIC OBSTRUCTIVE CARDIOMYOPATHY): ICD-10-CM

## 2022-01-14 DIAGNOSIS — R42 DIZZINESS: ICD-10-CM

## 2022-01-14 DIAGNOSIS — E43 SEVERE PROTEIN-CALORIE MALNUTRITION: ICD-10-CM

## 2022-01-14 DIAGNOSIS — N18.31 STAGE 3A CHRONIC KIDNEY DISEASE: ICD-10-CM

## 2022-01-14 DIAGNOSIS — Z00.00 WELL ADULT EXAM: ICD-10-CM

## 2022-01-14 DIAGNOSIS — E27.8 OTHER SPECIFIED DISORDERS OF ADRENAL GLAND: ICD-10-CM

## 2022-01-14 DIAGNOSIS — K59.00 CONSTIPATION, UNSPECIFIED CONSTIPATION TYPE: ICD-10-CM

## 2022-01-14 DIAGNOSIS — R63.0 DECREASED APPETITE: ICD-10-CM

## 2022-01-14 PROBLEM — G47.00 INSOMNIA: Status: ACTIVE | Noted: 2022-01-14

## 2022-01-14 PROCEDURE — 80053 COMPREHEN METABOLIC PANEL: CPT | Performed by: FAMILY MEDICINE

## 2022-01-14 PROCEDURE — 1100F PTFALLS ASSESS-DOCD GE2>/YR: CPT | Mod: CPTII,S$GLB,, | Performed by: FAMILY MEDICINE

## 2022-01-14 PROCEDURE — 82728 ASSAY OF FERRITIN: CPT | Performed by: FAMILY MEDICINE

## 2022-01-14 PROCEDURE — 1159F MED LIST DOCD IN RCRD: CPT | Mod: CPTII,S$GLB,, | Performed by: FAMILY MEDICINE

## 2022-01-14 PROCEDURE — 83036 HEMOGLOBIN GLYCOSYLATED A1C: CPT | Performed by: FAMILY MEDICINE

## 2022-01-14 PROCEDURE — 1160F PR REVIEW ALL MEDS BY PRESCRIBER/CLIN PHARMACIST DOCUMENTED: ICD-10-PCS | Mod: CPTII,S$GLB,, | Performed by: FAMILY MEDICINE

## 2022-01-14 PROCEDURE — 1100F PR PT FALLS ASSESS DOC 2+ FALLS/FALL W/INJURY/YR: ICD-10-PCS | Mod: CPTII,S$GLB,, | Performed by: FAMILY MEDICINE

## 2022-01-14 PROCEDURE — 99397 PER PM REEVAL EST PAT 65+ YR: CPT | Mod: S$GLB,,, | Performed by: FAMILY MEDICINE

## 2022-01-14 PROCEDURE — 80061 LIPID PANEL: CPT | Performed by: FAMILY MEDICINE

## 2022-01-14 PROCEDURE — 84443 ASSAY THYROID STIM HORMONE: CPT | Performed by: FAMILY MEDICINE

## 2022-01-14 PROCEDURE — 36415 COLL VENOUS BLD VENIPUNCTURE: CPT | Mod: PO | Performed by: FAMILY MEDICINE

## 2022-01-14 PROCEDURE — 85027 COMPLETE CBC AUTOMATED: CPT | Performed by: FAMILY MEDICINE

## 2022-01-14 PROCEDURE — 84466 ASSAY OF TRANSFERRIN: CPT | Performed by: FAMILY MEDICINE

## 2022-01-14 PROCEDURE — 3288F PR FALLS RISK ASSESSMENT DOCUMENTED: ICD-10-PCS | Mod: CPTII,S$GLB,, | Performed by: FAMILY MEDICINE

## 2022-01-14 PROCEDURE — 99214 PR OFFICE/OUTPT VISIT, EST, LEVL IV, 30-39 MIN: ICD-10-PCS | Mod: 25,S$GLB,, | Performed by: FAMILY MEDICINE

## 2022-01-14 PROCEDURE — 3288F FALL RISK ASSESSMENT DOCD: CPT | Mod: CPTII,S$GLB,, | Performed by: FAMILY MEDICINE

## 2022-01-14 PROCEDURE — 99999 PR PBB SHADOW E&M-EST. PATIENT-LVL V: CPT | Mod: PBBFAC,,, | Performed by: FAMILY MEDICINE

## 2022-01-14 PROCEDURE — 1126F AMNT PAIN NOTED NONE PRSNT: CPT | Mod: CPTII,S$GLB,, | Performed by: FAMILY MEDICINE

## 2022-01-14 PROCEDURE — 99397 PR PREVENTIVE VISIT,EST,65 & OVER: ICD-10-PCS | Mod: S$GLB,,, | Performed by: FAMILY MEDICINE

## 2022-01-14 PROCEDURE — 1160F RVW MEDS BY RX/DR IN RCRD: CPT | Mod: CPTII,S$GLB,, | Performed by: FAMILY MEDICINE

## 2022-01-14 PROCEDURE — 1126F PR PAIN SEVERITY QUANTIFIED, NO PAIN PRESENT: ICD-10-PCS | Mod: CPTII,S$GLB,, | Performed by: FAMILY MEDICINE

## 2022-01-14 PROCEDURE — 99214 OFFICE O/P EST MOD 30 MIN: CPT | Mod: 25,S$GLB,, | Performed by: FAMILY MEDICINE

## 2022-01-14 PROCEDURE — 99999 PR PBB SHADOW E&M-EST. PATIENT-LVL V: ICD-10-PCS | Mod: PBBFAC,,, | Performed by: FAMILY MEDICINE

## 2022-01-14 PROCEDURE — 1159F PR MEDICATION LIST DOCUMENTED IN MEDICAL RECORD: ICD-10-PCS | Mod: CPTII,S$GLB,, | Performed by: FAMILY MEDICINE

## 2022-01-14 RX ORDER — BENAZEPRIL HYDROCHLORIDE 10 MG/1
10 TABLET ORAL DAILY
Qty: 90 TABLET | Refills: 4 | Status: SHIPPED | OUTPATIENT
Start: 2022-01-14 | End: 2023-04-09

## 2022-01-14 RX ORDER — CLOPIDOGREL BISULFATE 75 MG/1
75 TABLET ORAL DAILY
Qty: 90 TABLET | Refills: 4 | Status: SHIPPED | OUTPATIENT
Start: 2022-01-14 | End: 2022-08-15

## 2022-01-14 RX ORDER — PREGABALIN 75 MG/1
75 CAPSULE ORAL 2 TIMES DAILY
Qty: 60 CAPSULE | Refills: 5 | Status: SHIPPED | OUTPATIENT
Start: 2022-01-14 | End: 2022-07-25

## 2022-01-14 RX ORDER — OMEPRAZOLE 20 MG/1
20 CAPSULE, DELAYED RELEASE ORAL NIGHTLY
Qty: 90 CAPSULE | Refills: 4 | Status: SHIPPED | OUTPATIENT
Start: 2022-01-14

## 2022-01-14 RX ORDER — TRAMADOL HYDROCHLORIDE 50 MG/1
50 TABLET ORAL EVERY 6 HOURS PRN
Qty: 120 TABLET | Refills: 1 | Status: SHIPPED | OUTPATIENT
Start: 2022-01-14 | End: 2022-04-25

## 2022-01-14 RX ORDER — ATORVASTATIN CALCIUM 20 MG/1
20 TABLET, FILM COATED ORAL DAILY
Qty: 90 TABLET | Refills: 4 | Status: SHIPPED | OUTPATIENT
Start: 2022-01-14

## 2022-01-14 NOTE — PROGRESS NOTES
This note is specifically for wellness visit performed today.   WELLNESS EXAM      Patient ID: Tamie Fink is a 89 y.o. female with  has a past medical history of Allergy, Arthritis, Asthma, GERD (gastroesophageal reflux disease), HEARING LOSS, Hypertension, Lung disease, and Pneumonia.   Chief Complaint:  Encounter for wellness exam    Well Adult Physical: Patient here for a comprehensive physical exam.The patient reports Chronic problems.  Chronic pain.  Patient here for refill of tramadol.  Reports compliance.  No side effects reported.  Symptoms are controlled. Do you take any herbs or supplements that were not prescribed by a doctor? no   Are you taking calcium supplements? no    History:   LMP: No LMP recorded. Patient is postmenopausal.   MMG:   breast cancer in 5 relatives (cousin, maternal aunt, maternal aunt, maternal grandmother, sister).   Findings:  Computer-aided detection was utilized in the interpretation of this examination. This procedure was performed using tomosynthesis.       The breasts have scattered areas of fibroglandular density. There is no evidence of suspicious masses, microcalcifications or architectural distortion.     Impression:  No mammographic evidence of malignancy.     BI-RADS Category 1: Negative     Recommendation:  Routine screening mammogram in 1 year is recommended.     The patient's estimated lifetime risk of breast cancer (to age 85) based on Tyrer-Cuzick - 7 risk assessment model is: Tyrer-Cuzick: 0 %. According to the [employer],  patients with a lifetime breast cancer risk of 20% or higher might benefit from supplemental screening tests.             Exam Ended: 03/13/19 14:41             PAP: No result found no longer indicated  Colon cancer screening:   no longer indicated    Health Maintenance Topics with due status: Not Due       Topic Last Completion Date    Lipid Panel 01/14/2022      ==============================================  History  reviewed.   Health Maintenance Due   Topic Date Due    Pneumococcal Vaccines (Age 65+) (1 of 2 - PPSV23) Never done    TETANUS VACCINE  Never done    Shingles Vaccine (1 of 2) Never done    Mammogram  03/13/2020    COVID-19 Vaccine (3 - Booster for Pfizer series) 08/17/2021    Influenza Vaccine (1) 09/01/2021       Past Medical History:  Past Medical History:   Diagnosis Date    Allergy     Arthritis     Asthma     GERD (gastroesophageal reflux disease)     HEARING LOSS     Hypertension     Lung disease     Pneumonia      Past Surgical History:   Procedure Laterality Date    CAROTID STENT       Review of patient's allergies indicates:   Allergen Reactions    Montelukast Other (See Comments)     Weakness and disorientation      Current Outpatient Medications on File Prior to Visit   Medication Sig Dispense Refill    albuterol (PROVENTIL/VENTOLIN HFA) 90 mcg/actuation inhaler Inhale 2 puffs into the lungs.      ferrous sulfate (FEOSOL) 325 mg (65 mg iron) Tab tablet Take 1 tablet (325 mg total) by mouth daily with breakfast. 30 tablet 11    fluticasone-umeclidin-vilanter (TRELEGY ELLIPTA) 100-62.5-25 mcg DsDv Inhale 1 puff into the lungs.      ipratropium (ATROVENT HFA) 17 mcg/actuation inhaler Inhale 2 puffs into the lungs every 6 (six) hours. Rescue      mupirocin (BACTROBAN) 2 % ointment Apply topically 2 (two) times daily. 30 g 0    nystatin (MYCOSTATIN) 100,000 unit/mL suspension swish and spit FOUR MILLILITERS BY MOUTH FOUR TIMES DAILY FOR 10 DAYS 160 mL 0    OXYGEN-AIR DELIVERY SYSTEMS MISC by Miscellaneous route Use as directed  2 L at home 3 L on ambulation.  [email or web address]      SENNOSIDES (SENOKOT ORAL) Take by mouth as needed.       No current facility-administered medications on file prior to visit.     Social History     Socioeconomic History    Marital status:    Tobacco Use    Smoking status: Former Smoker     Packs/day: 1.00     Years: 40.00     Pack years: 40.00     Quit  date: 5/3/1984     Years since quittin.7    Smokeless tobacco: Never Used   Substance and Sexual Activity    Alcohol use: No    Drug use: No     Family History   Problem Relation Age of Onset    Cancer Mother     Heart disease Father     Breast cancer Sister     Breast cancer Maternal Aunt     Breast cancer Maternal Grandmother     Breast cancer Cousin     Breast cancer Maternal Aunt        Review of Systems   Constitutional: Positive for activity change, fatigue and unexpected weight change. Negative for chills and fever.   HENT: Negative for ear pain and sore throat.    Eyes: Negative for redness and visual disturbance.   Respiratory: Positive for cough, shortness of breath and wheezing.    Cardiovascular: Negative for chest pain and palpitations.   Gastrointestinal: Negative for nausea and vomiting.   Genitourinary: Negative for difficulty urinating and hematuria.   Musculoskeletal: Positive for arthralgias, back pain, gait problem and myalgias.   Skin: Negative for rash and wound.   Neurological: Positive for weakness. Negative for headaches.   Psychiatric/Behavioral: Positive for sleep disturbance. The patient is not nervous/anxious.       Objective:    Nursing note and vitals reviewed.  Vitals:    22 1451   BP: 130/60   Pulse:    Resp:    Temp:      Body mass index is 15.02 kg/m².   Physical Exam  Vitals and nursing note reviewed.   Constitutional:       General: She is not in acute distress.     Appearance: She is well-developed. She is ill-appearing (Chronically). She is not toxic-appearing or diaphoretic.      Comments: On 3 liters nasal cannula, here with daughter   HENT:      Head: Normocephalic and atraumatic.      Right Ear: Hearing and external ear normal.      Left Ear: Hearing and external ear normal.   Eyes:      General: Lids are normal.      Conjunctiva/sclera: Conjunctivae normal.      Pupils: Pupils are equal, round, and reactive to light.   Cardiovascular:      Rate and  Rhythm: Normal rate and regular rhythm.      Heart sounds: Normal heart sounds. No murmur heard.      Pulmonary:      Effort: Pulmonary effort is normal. No respiratory distress.      Breath sounds: Wheezing present.   Chest:      Chest wall: No tenderness.   Abdominal:      General: Bowel sounds are normal.      Palpations: Abdomen is soft.   Musculoskeletal:         General: Tenderness and deformity present. Normal range of motion.      Cervical back: Normal range of motion and neck supple.   Skin:     General: Skin is warm and dry.      Capillary Refill: Capillary refill takes less than 2 seconds.      Coloration: Skin is not pale.   Neurological:      General: No focal deficit present.      Mental Status: She is alert and oriented to person, place, and time. Mental status is at baseline. She is not disoriented.      GCS: GCS eye subscore is 4. GCS verbal subscore is 5. GCS motor subscore is 6.      Cranial Nerves: No cranial nerve deficit.      Sensory: No sensory deficit.      Motor: Weakness ( generalized) and atrophy present. No tremor.      Coordination: Coordination abnormal.      Gait: Gait abnormal ( using walking assistive device).      Comments: Generalized weakness throughout upper extremity and lower extremities.  No slurring of speech   Psychiatric:         Attention and Perception: She is attentive.         Mood and Affect: Mood is not anxious or depressed.         Speech: Speech is not rapid and pressured or slurred.         Behavior: Behavior normal. Behavior is not agitated, aggressive or hyperactive. Behavior is cooperative.         Thought Content: Thought content normal. Thought content is not paranoid or delusional. Thought content does not include homicidal or suicidal ideation. Thought content does not include homicidal or suicidal plan.         Cognition and Memory: Memory is not impaired.         Judgment: Judgment normal.     X-Ray Ribs 3 Views Bilateral  Narrative: EXAMINATION:  XR  CHEST PA AND LATERAL; XR RIBS 3 VIEWS BILATERAL    CLINICAL HISTORY:  Unspecified fall, initial encounter    TECHNIQUE:  PA and lateral views of the chest were performed.  Three views of both ribs were also performed.    COMPARISON:  Chest radiographs dating back to June 4, 2013    FINDINGS:  There are chronic bilateral patchy upper lobe predominant interstitial and airspace opacities, greater on the left than right, similar in comparison to prior examinations dating back to 2013.  There are multiple lucencies in these areas of opacity possibly representing superimposed areas of cavitation or bronchiectasis.  No definite acute opacity within either lung.  There is new blunting of the left costophrenic angle which possibly represents a small left effusion versus pleural scarring.  No right effusion.  No pneumothorax.  Heart size is normal.  Atherosclerotic calcifications present within the thoracic aorta.  No acute osseous abnormality.  Specifically, there is no definite rib fracture identified.  Impression: As above.    Electronically signed by: Miguel Mcgrath  Date:    01/10/2022  Time:    12:48  X-Ray Chest PA And Lateral  Narrative: EXAMINATION:  XR CHEST PA AND LATERAL; XR RIBS 3 VIEWS BILATERAL    CLINICAL HISTORY:  Unspecified fall, initial encounter    TECHNIQUE:  PA and lateral views of the chest were performed.  Three views of both ribs were also performed.    COMPARISON:  Chest radiographs dating back to June 4, 2013    FINDINGS:  There are chronic bilateral patchy upper lobe predominant interstitial and airspace opacities, greater on the left than right, similar in comparison to prior examinations dating back to 2013.  There are multiple lucencies in these areas of opacity possibly representing superimposed areas of cavitation or bronchiectasis.  No definite acute opacity within either lung.  There is new blunting of the left costophrenic angle which possibly represents a small left effusion versus  pleural scarring.  No right effusion.  No pneumothorax.  Heart size is normal.  Atherosclerotic calcifications present within the thoracic aorta.  No acute osseous abnormality.  Specifically, there is no definite rib fracture identified.  Impression: As above.    Electronically signed by: Miguel Mcgrath  Date:    01/10/2022  Time:    12:48      Lab Visit on 03/23/2021   Component Date Value Ref Range Status    Ferritin 03/23/2021 69  20.0 - 300.0 ng/mL Final    Iron 03/23/2021 38  30 - 160 ug/dL Final    Transferrin 03/23/2021 233  200 - 375 mg/dL Final    TIBC 03/23/2021 345  250 - 450 ug/dL Final    Saturated Iron 03/23/2021 11* 20 - 50 % Final    Vitamin B-12 03/23/2021 735  210 - 950 pg/mL Final    Retic 03/23/2021 1.4  0.5 - 2.5 % Final    WBC 03/23/2021 7.90  3.90 - 12.70 K/uL Final    RBC 03/23/2021 3.44* 4.00 - 5.40 M/uL Final    Hemoglobin 03/23/2021 9.8* 12.0 - 16.0 g/dL Final    Hematocrit 03/23/2021 32.9* 37.0 - 48.5 % Final    MCV 03/23/2021 96  82 - 98 fL Final    MCH 03/23/2021 28.5  27.0 - 31.0 pg Final    MCHC 03/23/2021 29.8* 32.0 - 36.0 g/dL Final    RDW 03/23/2021 15.6* 11.5 - 14.5 % Final    Platelets 03/23/2021 250  150 - 350 K/uL Final    MPV 03/23/2021 11.9  9.2 - 12.9 fL Final    Immature Granulocytes 03/23/2021 0.3  0.0 - 0.5 % Final    Gran # (ANC) 03/23/2021 5.0  1.8 - 7.7 K/uL Final    Immature Grans (Abs) 03/23/2021 0.02  0.00 - 0.04 K/uL Final    Comment: Mild elevation in immature granulocytes is non specific and   can be seen in a variety of conditions including stress response,   acute inflammation, trauma and pregnancy. Correlation with other   laboratory and clinical findings is essential.      Lymph # 03/23/2021 1.8  1.0 - 4.8 K/uL Final    Mono # 03/23/2021 0.8  0.3 - 1.0 K/uL Final    Eos # 03/23/2021 0.2  0.0 - 0.5 K/uL Final    Baso # 03/23/2021 0.05  0.00 - 0.20 K/uL Final    nRBC 03/23/2021 0  0 /100 WBC Final    Gran % 03/23/2021 63.8  38.0 -  73.0 % Final    Lymph % 03/23/2021 23.0  18.0 - 48.0 % Final    Mono % 03/23/2021 9.9  4.0 - 15.0 % Final    Eosinophil % 03/23/2021 2.4  0.0 - 8.0 % Final    Basophil % 03/23/2021 0.6  0.0 - 1.9 % Final    Differential Method 03/23/2021 Automated   Final      Assessment / Plan:      1.  ANNUAL WELLNESS EXAM -patient here for annual wellness exam.  Labs ordered.  Health maintenance was reviewed and ordered.  Medications were reviewed and reconciled.   Anticipatory guidance: Don't smoke.  Healthy diet and regular exercise recommended. Vaccine recommendations discussed.  See orders.  Reviewed Anticipatory guidance, risk factor reduction interventions and counseling, Complete history , physical was completed today.  Complete and thorough medication reconciliation was performed.  Discussed risks and benefits of medications.  Advised patient on orders and health maintenance.  We discussed old records and old labs if available.  Will request any records not available through epic.  Continue current medications listed on your summary sheet.    Left hip arthritis, degenerative disc disease of the lumbar spine:  Chronic pain:  Refill tramadol.  Continue Lyrica.  Anti-inflammatory sparingly.The patient was checked in the [facility] [employer] [facility] [employer]'s  Prescription Monitoring Program. There appears to be no incongruities with the patient's verbalized history.       All questions were answered. Patient had no further concerns. Advised of Wellness plan. Follow up in 1 year for ANNUAL WELLNESS EXAM    Orders Placed This Encounter   Procedures    Urinalysis, Reflex to Urine Culture Urine, Clean Catch     Standing Status:   Future     Number of Occurrences:   1     Standing Expiration Date:   7/14/2023     Order Specific Question:   Preferred Collection Type     Answer:   Urine, Clean Catch     Order Specific Question:   Specimen Source     Answer:   Urine    Ambulatory referral/consult to Home Health     Standing  Status:   Future     Standing Expiration Date:   2/14/2023     Referral Priority:   Urgent     Referral Type:   Home Health     Referral Reason:   Specialty Services Required     Referred to Provider:   [facility]     Requested Specialty:   Home Health Services     Number of Visits Requested:   1     Medications Ordered This Encounter   Medications    atorvastatin (LIPITOR) 20 MG tablet     Sig: Take 1 tablet (20 mg total) by mouth once daily.     Dispense:  90 tablet     Refill:  4    benazepriL (LOTENSIN) 10 MG tablet     Sig: Take 1 tablet (10 mg total) by mouth once daily.     Dispense:  90 tablet     Refill:  4     .    clopidogreL (PLAVIX) 75 mg tablet     Sig: Take 1 tablet (75 mg total) by mouth once daily.     Dispense:  90 tablet     Refill:  4    linaCLOtide (LINZESS) 72 mcg Cap capsule     Sig: Take 1 capsule (72 mcg total) by mouth once daily.     Dispense:  30 capsule     Refill:  1    omeprazole (PRILOSEC) 20 MG capsule     Sig: Take 1 capsule (20 mg total) by mouth every evening.     Dispense:  90 capsule     Refill:  4    pregabalin (LYRICA) 75 MG capsule     Sig: Take 1 capsule (75 mg total) by mouth 2 (two) times daily.     Dispense:  60 capsule     Refill:  5    traMADoL (ULTRAM) 50 mg tablet     Sig: Take 1 tablet (50 mg total) by mouth every 6 (six) hours as needed.     Dispense:  120 tablet     Refill:  1     Medically necessary      Future Appointments     Date Provider Specialty Appt Notes    2/3/2022  Lab     1/13/2023 José Manuel Tyler MD Family Medicine Annual           José Manuel Tyler MD

## 2022-01-14 NOTE — PATIENT INSTRUCTIONS
Follow up in about 1 year (around 1/14/2023), or if symptoms worsen or fail to improve, for Annual Wellness Exam.   Patient Education       High Calorie, High Protein Diet   About this topic   Protein helps build muscle and repair parts of the body when hurt. Calories give the body energy. You should follow this diet if you:  · Recently lost weight  · Have a poor appetite  · Have burns and infection  · Need more protein     What will the results be?   This diet will help you:  · Gain energy and weight  · Heal and fight infection  · Get better after surgery or being sick  What drugs may be needed?   Talk to your doctor about the drugs you will need to take. Ask if you need to take any vitamins or diet supplements. Be sure to take all your drugs on time as told.  What changes to diet are needed?   Ask your doctor or dietitian for good snack ideas. They can help you make healthy changes to your diet.  When is this diet used?   This diet is used when your body is weak. This may be after surgery or if you are getting better after being sick.  What foods are good to eat?   High-Calorie Foods:   · Cheese, cream cheese  · Whole milk, heavy cream, whipped cream  · Sour cream  · Butter, margarine, oil  · Ice cream  · Cake, cookies, chocolate  · Gravy  · Salad dressing, mayonnaise  · Avocado  · Jam, jelly, syrup  · Honey, sugar  · Dried fruit  · Nuts and nut butters  · Fatty fish  · Seeds and grains  High-Protein Foods:   · Cheese, cottage cheese  · Milk, soy milk, milk powder  · Eggs  · Greek yogurt and other yogurts  · Nuts, seeds, nut butter  · Tofu and other soy products  · Beans, peas, lentils  · Quinoa  · Beef, chicken, turkey, pork, and other meats  · Fish and other seafood  · Nutrition supplement drinks  Will there be any other care needed?   Talk to your doctor about any other health problems you may have. Some of them may need to be watched while you are on this kind of diet.  When do I need to call the doctor?    Health problem is not better or you are feeling worse.  Helpful tips   · Eat small meals and snacks every few hours during the day.  · Always bring snacks with you if you are away from home.  · Try adding protein powder to other foods like soups, gravies, potatoes, and puddings.  · Add extra cheese on sandwiches, salads, meats, eggs, and vegetables.  · Add gravy and sauce to meats.  · Add avocado to sandwiches, salads, soups, casseroles, Mexican food, and eggs.  · Spread nut butter on bread, bagels, crackers, pancakes, and fruit.  · Add nuts and seeds to hot or cold cereal, yogurt or smoothies, stir-jimenez dishes, vegetables, salads, and casseroles.  · Add dried fruit to hot or cold cereal, salad, and yogurt.  · Add oil to salads, vegetables, pasta, and tomato sauce.  Where can I learn more?   [employer]  https://familydoctor.org/healthy-ways-to-gain-weight-if-youre-underweight/   Last Reviewed Date   2021-06-18  Consumer Information Use and Disclaimer   This information is not specific medical advice and does not replace information you receive from your health care provider. This is only a brief summary of general information. It does NOT include all information about conditions, illnesses, injuries, tests, procedures, treatments, therapies, discharge instructions or life-style choices that may apply to you. You must talk with your health care provider for complete information about your health and treatment options. This information should not be used to decide whether or not to accept your health care providers advice, instructions or recommendations. Only your health care provider has the knowledge and training to provide advice that is right for you.  Copyright   Copyright © 2021 [employer] and its affiliates and/or licensors. All rights reserved.  Patient Education       Failure to Thrive, Adult   About this topic   Failure to thrive or FTT can happen in adults as well as in  children. Most of the time, adults slowly seem to be less healthy. They may lose weight and have more health problems. Adults with FTT may also have a poor appetite and poor nutrition. They may show signs of low mood and be less active.  Treatment will depend on the cause. Your doctor may order drugs and treat other health problems. The doctor may work to help your eating habits. You may need a feeding tube to give nutrition. Your doctor may admit you to the hospital if you have very bad nutrition.  What are the causes?   · Illnesses like cancer; diabetes; heart, stomach, kidney, or liver problems  · Trouble moving about from something like a fall, surgery, arthritis, or a stroke  · Drugs to treat certain conditions  · Lack of food or not able to get or cook food  · Mental health issues, eating disorders, or memory problems  · Elder abuse or neglect  What are the main signs?   · Weight loss  · Not feeling hungry  · Poor food intake  · Less active  · Low fluid intake  · Low mood  · Memory loss  How does the doctor diagnose this health problem?   · The doctor will ask you about your health history and do an exam. The doctor will ask you about:  ? How often you eat  ? Any swallowing problems  ? Your bowel movements  ? Upset stomach and throwing up  ? Your activity level  ? What drugs you take  · The doctor may ask you for a list of foods you eat and drink. This will help the doctor check the amount of calories you are taking.  · Your doctor may order blood, urine, or other tests.  How does the doctor treat this health problem?   Your care is based on the cause or findings. The doctor may ask you to see a dietitian or other expert to help improve your weight gain.  Are there other health problems to treat?   · Your doctor may ask you to change the types and the amount of foods you eat. The doctor may order vitamins and minerals.  · If other illnesses are found, those will be treated.  What drugs may be needed?   The  doctor may order drugs to:  · Add more calories, vitamins, and minerals into your diet  · Increase your appetite  · Treat other problems that may be keeping you from gaining weight  The doctor may also look at your drugs to see if any of them could be causing this problem.  What changes to diet are needed?   · Talk with your doctor or dietitian about the best foods to eat.  · You may need to have 3 meals and 3 snacks a day. Set a schedule so the snack is more than an hour before or after a meal.  · Eat healthy high calorie and protein snacks if you need to gain or maintain your weight. Good snacks are crackers and peanut butter, cheese, eggs, pudding, yogurt, cottage cheese, cereal, nuts, and fresh fruit or vegetables. Your doctor or dietitian may recommend that you drink a high calorie nutrition supplement.  What problems could happen?   · More weight loss  · Lack of interest in things around you  · Hair loss  · Low mood  · Higher risk for infection  · Not able to move about  · Loss of life  What can be done to prevent this health problem?   · Mealtime should be relaxed and social. Eat with other family members or friends. Focus on pleasant conversation, rather than how much you are eating.  · Stay active. Take walks or run errands if possible. Play games, do puzzles or crafts.  · Be sure glasses, hearing aids, and dentures fit well and are working.  Last Reviewed Date   2021-02-24  Consumer Information Use and Disclaimer   This information is not specific medical advice and does not replace information you receive from your health care provider. This is only a brief summary of general information. It does NOT include all information about conditions, illnesses, injuries, tests, procedures, treatments, therapies, discharge instructions or life-style choices that may apply to you. You must talk with your health care provider for complete information about your health and treatment options. This information should  not be used to decide whether or not to accept your health care providers advice, instructions or recommendations. Only your health care provider has the knowledge and training to provide advice that is right for you.  Copyright   Copyright ©  [employer] and its affiliates and/or licensors. All rights reserved.  Patient Education       Tramadol (TRA ma dole)   Brand Names: [place] ConZip; Qdolo; Ultram   Brand Names: Suzanne APO-Tramadol; AURO-Tramadol; Durela; JAMP Tramadol; MAR-Tramadol; Ralivia; SANDOZ Tramadol; TARO-Tramadol ER; Tridural; Ultram [DSC]; Zytram XL   Warning   For all patients taking this drug:   · This drug is a strong pain drug that can put you at risk for addiction, abuse, and misuse. Misuse or abuse of this drug can lead to overdose and death. Talk with your doctor.  · You will be watched closely to make sure you do not misuse, abuse, or become addicted to this drug.  · This drug may cause very bad and sometimes deadly breathing problems. Call your doctor right away if you have slow, shallow, or trouble breathing.  · The chance of very bad and sometimes deadly breathing problems may be greater when you first start this drug or anytime your dose is raised.  · Even one dose of this drug may be deadly if it is taken by someone else or by accident, especially in children. If this drug is taken by someone else or by accident, get medical help right away.  · Keep all drugs in a safe place. Keep all drugs out of the reach of children and pets.  · Using this drug for a long time during pregnancy may lead to withdrawal in the  baby. This can be life-threatening. Talk with the doctor.  · This drug has an opioid drug in it. Severe side effects have happened when opioid drugs were used with benzodiazepines, alcohol, marijuana or other forms of cannabis, or prescription or OTC drugs that may cause drowsiness or slowed actions. This includes slow or troubled breathing and death. Benzodiazepines  include drugs like alprazolam, diazepam, and lorazepam. Benzodiazepines may be used to treat many health problems like anxiety, trouble sleeping, or seizures. If you have questions, talk with the doctor.  · Many drugs interact with this drug and can raise the chance of side effects like deadly breathing problems. Talk with your doctor and pharmacist to make sure it is safe to use this drug with all of your drugs.  · Do not take with alcohol or products that have alcohol. Unsafe and sometimes deadly effects may happen.  · Get medical help right away if you feel very sleepy, very dizzy, or if you pass out. Caregivers or others need to get medical help right away if the patient does not respond, does not answer or react like normal, or will not wake up.  Children:   · This drug is not approved for use in children. Do not give to a child younger than 12 years of age. Children between 12 and 18 years of age who are very overweight or have certain other health problems like sleep apnea or other lung or breathing problems must not use this drug. If your child has been given this drug, ask the doctor for information about the benefits and risks.  · Some children have had very bad and sometimes deadly breathing problems when using tramadol after surgery to remove tonsils or adenoids. Do not give to a child younger than 18 years of age who has had surgery to remove tonsils or adenoids. Talk with your child's doctor.  All liquid products:   · Be sure that you know how to measure your dose. Dosing errors can lead to accidental overdose and death. If you have any questions, talk with your doctor or pharmacist.  What is this drug used for?   · It is used to ease pain.    What do I need to tell my doctor BEFORE I take this drug?   · If you are allergic to this drug; any part of this drug; or any other drugs, foods, or substances. Tell your doctor about the allergy and what signs you had.  · If you have any of these health  problems: Lung or breathing problems like asthma, trouble breathing, or sleep apnea; high levels of carbon dioxide in the blood; or stomach or bowel block or narrowing.  · If you have any of these health problems: Kidney disease or liver disease.  · If you have thoughts of suicide or if you have ever had alcohol or other drug abuse or dependence.  · If you have been told by your doctor that you are a rapid metabolizer of some drugs.  · If you have recently drunk a lot of alcohol or taken a big amount of drugs that may slow your actions like phenobarbital or some pain drugs like oxycodone.  · If you are taking carbamazepine.  · If you are taking another drug that has the same drug in it.  · If you are taking any of these drugs: Buprenorphine, butorphanol, linezolid, methylene blue, nalbuphine, or pentazocine.  · If you have taken certain drugs for depression or Parkinson's disease in the last 14 days. This includes isocarboxazid, phenelzine, tranylcypromine, selegiline, or rasagiline. Very high blood pressure may happen.  · If you are breast-feeding. Do not breast-feed while you take this drug.  This is not a list of all drugs or health problems that interact with this drug.  Tell your doctor and pharmacist about all of your drugs (prescription or OTC, natural products, vitamins) and health problems. You must check to make sure that it is safe for you to take this drug with all of your drugs and health problems. Do not start, stop, or change the dose of any drug without checking with your doctor.  What are some things I need to know or do while I take this drug?   · Tell all of your health care providers that you take this drug. This includes your doctors, nurses, pharmacists, and dentists.  · Avoid driving and doing other tasks or actions that call for you to be alert until you see how this drug affects you.  · To lower the chance of feeling dizzy or passing out, rise slowly if you have been sitting or lying down.  Be careful going up and down stairs.  · Do not take more than what your doctor told you to take. Taking more than you are told may raise your chance of very bad side effects.  · Do not take this drug with other strong pain drugs or if you are using a pain patch without talking to your doctor first.  · This drug may raise the chance of seizures. The chance may be higher in people who have certain health problems, use certain other drugs, or drink a lot of alcohol. Talk to your doctor to see if you have a greater chance of seizures while taking this drug.  · If you have been taking this drug for a long time or at high doses, it may not work as well and you may need higher doses to get the same effect. This is known as tolerance. Call your doctor if this drug stops working well. Do not take more than ordered.  · Low blood sugar has happened with this drug. Sometimes, people have had to go to the hospital. Call your doctor right away if you have signs of low blood sugar like dizziness, headache, feeling sleepy or weak, shaking, a fast heartbeat, confusion, hunger, or sweating.  · Long-term or regular use of opioid drugs like this drug may lead to dependence. Lowering the dose or stopping this drug all of a sudden may cause a greater risk of withdrawal or other severe problems. Talk to your doctor before you lower the dose or stop this drug. You will need to follow your doctors instructions. Tell your doctor if you have more pain, mood changes, thoughts of suicide, or any other bad effects.  · Long-term use of an opioid drug may lead to lower sex hormone levels. Call your doctor if you have a lowered interest in sex, fertility problems, no menstrual period, or ejaculation problems.  · Taking an opioid drug like this drug may lead to a rare but very bad adrenal gland problem. Call your doctor right away if you have very bad dizziness or passing out, very bad upset stomach or throwing up, or if you feel less hungry, very  tired, or very weak.  · If you are 65 or older, use this drug with care. You could have more side effects.  · This drug may cause harm to the unborn baby if you take it while you are pregnant. If you are pregnant or you get pregnant while taking this drug, call your doctor right away.    What are some side effects that I need to call my doctor about right away?   WARNING/CAUTION: Even though it may be rare, some people may have very bad and sometimes deadly side effects when taking a drug. Tell your doctor or get medical help right away if you have any of the following signs or symptoms that may be related to a very bad side effect:  · Signs of an allergic reaction, like rash; hives; itching; red, swollen, blistered, or peeling skin with or without fever; wheezing; tightness in the chest or throat; trouble breathing, swallowing, or talking; unusual hoarseness; or swelling of the mouth, face, lips, tongue, or throat. Rarely, some allergic reactions have been deadly.  · Signs of depression, suicidal thoughts, emotional ups and downs, abnormal thinking, anxiety, or lack of interest in life.  · Signs of low sodium levels like headache, trouble focusing, memory problems, feeling confused, weakness, seizures, or change in balance.  · Very bad dizziness or passing out.  · Feeling confused.  · Seizures.  · Chest pain or pressure or a fast heartbeat.  · Trouble passing urine.  · Passing urine more often.  · Trouble breathing, slow breathing, or shallow breathing.  · Noisy breathing.  · Breathing problems during sleep (sleep apnea).  · Change in eyesight.  · Severe constipation or stomach pain. These may be signs of a severe bowel problem.  · A severe and sometimes deadly problem called serotonin syndrome may happen. The risk may be greater if you also take certain other drugs. Call your doctor right away if you have agitation; change in balance; confusion; hallucinations; fever; fast or abnormal heartbeat; flushing; muscle  twitching or stiffness; seizures; shivering or shaking; sweating a lot; severe diarrhea, upset stomach, or throwing up; or very bad headache.  · A severe skin reaction ([place]-Jeff syndrome/toxic epidermal necrolysis) may happen. It can cause severe health problems that may not go away, and sometimes death. Get medical help right away if you have signs like red, swollen, blistered, or peeling skin (with or without fever); red or irritated eyes; or sores in your mouth, throat, nose, or eyes.  What are some other side effects of this drug?   All drugs may cause side effects. However, many people have no side effects or only have minor side effects. Call your doctor or get medical help if any of these side effects or any other side effects bother you or do not go away:  · Feeling dizzy, sleepy, tired, or weak.  · Constipation, diarrhea, throwing up, or upset stomach.  · Dry mouth.  · Headache.  · Itching.  · Trouble sleeping.  · Flushing.  · Sweating a lot.  These are not all of the side effects that may occur. If you have questions about side effects, call your doctor. Call your doctor for medical advice about side effects.  You may report side effects to your national health agency.  You may report side effects to the FDA at 1-767.966.7670. You may also report side effects at https://www.fda.gov/medwatch.  How is this drug best taken?   Use this drug as ordered by your doctor. Read all information given to you. Follow all instructions closely.  All products:   · Take by mouth only.  · Do not inject or snort this drug. Doing any of these things can cause very bad side effects like trouble breathing and death from overdose.  All liquid products and tablets:   · Take with or without food. Take with food if it causes an upset stomach.  All liquid products:   · Measure liquid doses carefully. Use the measuring device that comes with this drug. If there is none, ask the pharmacist for a device to measure this  drug.  · Do not use a household teaspoon or tablespoon to measure this drug. Doing so could lead to the dose being too high.  Liquid (suspension):   · Shake well before use.  All extended-release products:   · Swallow whole. Do not chew, break, crush, or dissolve before swallowing. Doing these things can cause very bad side effects and death.  · Take this drug with or without food. Some products need to be taken the same way each time, either always with food or always without food. Be sure you know how to take your product with regard to food. If you are not sure how to take your product with regard to food, talk with your doctor or pharmacist.  · Take this drug at the same time of day.  · Do not use for fast pain relief or on an as needed basis.  · Do not use for pain relief after surgery if you have not been taking drugs like this drug.  · If you have trouble swallowing, talk with your doctor.  What do I do if I miss a dose?   · Take a missed dose as soon as you think about it.  · If it is close to the time for your next dose, skip the missed dose and go back to your normal time.  · Do not take 2 doses at the same time or extra doses.  · Some products are to be taken to ease pain as needed. If you are taking this drug as needed, do not take more often than told by your doctor.    How do I store and/or throw out this drug?   All products:   · Store at room temperature in a dry place. Do not store in a bathroom.  · Store this drug in a safe place where children cannot see or reach it, and where other people cannot get to it. A locked box or area may help keep this drug safe. Keep all drugs away from pets.  · Throw away unused or  drugs. Do not flush down a toilet or pour down a drain unless you are told to do so. Check with your pharmacist if you have questions about the best way to throw out drugs. There may be drug take-back programs in your area.  Liquid (suspension):   · Be sure you know how long you can  store this drug before you need to throw it away.  General drug facts   · If your symptoms or health problems do not get better or if they become worse, call your doctor.  · Do not share your drugs with others and do not take anyone else's drugs.  · Some drugs may have another patient information leaflet. If you have any questions about this drug, please talk with your doctor, nurse, pharmacist, or other health care provider.  · This drug comes with an extra patient fact sheet called a Medication Guide. Read it with care. Read it again each time this drug is refilled. If you have any questions about this drug, please talk with the doctor, pharmacist, or other health care provider.  · A drug called naloxone can be used to help treat an overdose of this drug. Your doctor may order naloxone for you to keep with you while you take this drug. If you have questions about how to get or use naloxone, talk with your doctor or pharmacist. If you think there has been an overdose, get medical care right away even if naloxone has been used. Be ready to tell or show what was taken, how much, and when it happened.    Consumer Information Use and Disclaimer   This generalized information is a limited summary of diagnosis, treatment, and/or medication information. It is not meant to be comprehensive and should be used as a tool to help the user understand and/or assess potential diagnostic and treatment options. It does NOT include all information about conditions, treatments, medications, side effects, or risks that may apply to a specific patient. It is not intended to be medical advice or a substitute for the medical advice, diagnosis, or treatment of a health care provider based on the health care provider's examination and assessment of a patient's specific and unique circumstances. Patients must speak with a health care provider for complete information about their health, medical questions, and treatment options, including  any risks or benefits regarding use of medications. This information does not endorse any treatments or medications as safe, effective, or approved for treating a specific patient. [employer] and its affiliates disclaim any warranty or liability relating to this information or the use thereof. The use of this information is governed by the Terms of Use, available at https://www.[employer]/en/solutions/lexicomp/about/david.  Last Reviewed Date   2021-07-15  Copyright   © 2021 [employer] and its affiliates and/or licensors. All rights reserved.    Dear patient,   As a result of recent federal legislation (The Federal Cures Act), you may receive lab or pathology results from your visit in your [facility] account before your physician is able to contact you. Your physician or their representative will relay the results to you with their recommendations at their soonest availability.     If no improvement in symptoms or symptoms worsen, please be advised to call MD, follow-up at clinic and/or go to ER if becomes severe.    José Manuel Tyler M.D.        We Offer TELEHEALTH & Same Day Appointments!   Book your Telehealth appointment with me through my nurse or   Clinic appointments on [facility]!    [address] [place] [address]    Office: [phone]   FAX: [phone]    Check out my Facebook Page and Follow Me at: [email or web address]    Check out my website at [email or web address] by clicking on: [email or web address]    To Schedule appointments online, go to [email or web address]: [email or web address]

## 2022-01-15 DIAGNOSIS — N30.01 ACUTE CYSTITIS WITH HEMATURIA: Primary | ICD-10-CM

## 2022-01-15 PROBLEM — N18.31 STAGE 3A CHRONIC KIDNEY DISEASE: Chronic | Status: ACTIVE | Noted: 2022-01-14

## 2022-01-15 PROBLEM — E43 SEVERE PROTEIN-CALORIE MALNUTRITION: Chronic | Status: ACTIVE | Noted: 2022-01-15

## 2022-01-15 PROBLEM — R42 DIZZINESS: Chronic | Status: ACTIVE | Noted: 2019-10-14

## 2022-01-15 PROBLEM — R63.0 DECREASED APPETITE: Chronic | Status: ACTIVE | Noted: 2022-01-14

## 2022-01-15 PROBLEM — Z99.81 OXYGEN DEPENDENT: Chronic | Status: ACTIVE | Noted: 2022-01-14

## 2022-01-15 PROBLEM — G47.10 HYPERSOMNOLENCE: Chronic | Status: ACTIVE | Noted: 2022-01-14

## 2022-01-15 PROBLEM — K59.00 CONSTIPATION: Chronic | Status: ACTIVE | Noted: 2022-01-14

## 2022-01-15 PROBLEM — E43 SEVERE PROTEIN-CALORIE MALNUTRITION: Status: ACTIVE | Noted: 2022-01-15

## 2022-01-15 PROBLEM — D69.2 OTHER NONTHROMBOCYTOPENIC PURPURA: Chronic | Status: ACTIVE | Noted: 2022-01-14

## 2022-01-15 LAB
ALBUMIN SERPL BCP-MCNC: 2.4 G/DL (ref 3.5–5.2)
ALP SERPL-CCNC: 79 U/L (ref 55–135)
ALT SERPL W/O P-5'-P-CCNC: 9 U/L (ref 10–44)
ANION GAP SERPL CALC-SCNC: 9 MMOL/L (ref 8–16)
AST SERPL-CCNC: 19 U/L (ref 10–40)
BILIRUB SERPL-MCNC: 0.2 MG/DL (ref 0.1–1)
BUN SERPL-MCNC: 17 MG/DL (ref 8–23)
CALCIUM SERPL-MCNC: 9.1 MG/DL (ref 8.7–10.5)
CHLORIDE SERPL-SCNC: 90 MMOL/L (ref 95–110)
CHOLEST SERPL-MCNC: 122 MG/DL (ref 120–199)
CHOLEST/HDLC SERPL: 2.9 {RATIO} (ref 2–5)
CO2 SERPL-SCNC: 32 MMOL/L (ref 23–29)
CREAT SERPL-MCNC: 0.7 MG/DL (ref 0.5–1.4)
ERYTHROCYTE [DISTWIDTH] IN BLOOD BY AUTOMATED COUNT: 14.5 % (ref 11.5–14.5)
EST. GFR  (AFRICAN AMERICAN): >60 ML/MIN/1.73 M^2
EST. GFR  (NON AFRICAN AMERICAN): >60 ML/MIN/1.73 M^2
ESTIMATED AVG GLUCOSE: 126 MG/DL (ref 68–131)
FERRITIN SERPL-MCNC: 310 NG/ML (ref 20–300)
GLUCOSE SERPL-MCNC: 105 MG/DL (ref 70–110)
HBA1C MFR BLD: 6 % (ref 4–5.6)
HCT VFR BLD AUTO: 31.7 % (ref 37–48.5)
HDLC SERPL-MCNC: 42 MG/DL (ref 40–75)
HDLC SERPL: 34.4 % (ref 20–50)
HGB BLD-MCNC: 9.5 G/DL (ref 12–16)
IRON SERPL-MCNC: 15 UG/DL (ref 30–160)
LDLC SERPL CALC-MCNC: 66 MG/DL (ref 63–159)
MCH RBC QN AUTO: 28.1 PG (ref 27–31)
MCHC RBC AUTO-ENTMCNC: 30 G/DL (ref 32–36)
MCV RBC AUTO: 94 FL (ref 82–98)
NONHDLC SERPL-MCNC: 80 MG/DL
PLATELET # BLD AUTO: 356 K/UL (ref 150–450)
PMV BLD AUTO: 11 FL (ref 9.2–12.9)
POTASSIUM SERPL-SCNC: 4.7 MMOL/L (ref 3.5–5.1)
PROT SERPL-MCNC: 8.2 G/DL (ref 6–8.4)
RBC # BLD AUTO: 3.38 M/UL (ref 4–5.4)
SATURATED IRON: 7 % (ref 20–50)
SODIUM SERPL-SCNC: 131 MMOL/L (ref 136–145)
TOTAL IRON BINDING CAPACITY: 228 UG/DL (ref 250–450)
TRANSFERRIN SERPL-MCNC: 154 MG/DL (ref 200–375)
TRIGL SERPL-MCNC: 70 MG/DL (ref 30–150)
TSH SERPL DL<=0.005 MIU/L-ACNC: 1.07 UIU/ML (ref 0.4–4)
WBC # BLD AUTO: 14.64 K/UL (ref 3.9–12.7)

## 2022-01-15 RX ORDER — CIPROFLOXACIN 500 MG/1
500 TABLET ORAL 2 TIMES DAILY
Qty: 14 TABLET | Refills: 0 | Status: SHIPPED | OUTPATIENT
Start: 2022-01-15 | End: 2022-01-22

## 2022-01-15 NOTE — ASSESSMENT & PLAN NOTE
Increase hydration with water.  Need to increase nutrition also BMI is decreased.  Electrolytes are abnormal due to decreased nutrition.  Avoid nephrotoxic agents.

## 2022-01-15 NOTE — PROGRESS NOTES
Make follow-up lab appointment per recommendation below.  Check to see if patient has seen the results through my chart.  If not then,  #CALL THE PATIENT# to discuss results/see if they have questions and document verification of contact. Make F/U appt if needed. [phone]    #My interpretation that was sent to them through [employer]:  [place], I have reviewed your recent blood work.     Your complete blood count is abnormal.  White blood cell count is elevated.  Hemoglobin is decreased consistent with anemia but stable from previous.  Platelet counts are normal.  I recommend rechecking in four weeks.  Iron levels are very low.  I recommend increasing compliance with iron supplement.  If cannot tolerate oral iron then follow-up/establish care with hematology for iron infusions.  Repeat CBC in 2 to 4 weeks.  Your metabolic panel which shows your glucose, kidney function, electrolytes, and liver function is abnormal.  Sodium level, chloride, CO2 are abnormal.  Albumin is very low consistent with protein calorie malnutrition.  Increase nutrition with high-protein shakes.  Thyroid study is normal.   Your cholesterol is stable.    Your hemoglobin A1c is stable.  This test is gold standard screening test for diabetes.  It is a measures 3 months of your average blood sugar.    Recommendations as above.  Proceed with home health as discussed at office visit.  =========================  Also please address any outstanding health maintenance that may be due: Pneumococcal Vaccines (Age 65+)(1 of 2 - PPSV23) Never done  TETANUS VACCINE Never done  Shingles Vaccine(1 of 2) Never done  Mammogram due on 03/13/2020  COVID-19 Vaccine(3 - Booster for Pfizer series) due on 08/17/2021  Influenza Vaccine(1) due on 09/01/2021

## 2022-01-15 NOTE — ASSESSMENT & PLAN NOTE
Abnormal labs reviewed.  Referral to home health for further monitoring and assistance with medications.Complete history and physical was completed today.  Complete and thorough medication reconciliation was performed.  Discussed risks and benefits of medications.  Advised patient on orders and health maintenance.  We discussed old records and old labs if available.  Will request any records not available through epic.  Continue current medications listed on your summary sheet.

## 2022-01-15 NOTE — ASSESSMENT & PLAN NOTE
Referral to home [facility] for physical therapy occupational therapy consult for evaluation and treatment.  Fall precautions.

## 2022-01-15 NOTE — ASSESSMENT & PLAN NOTE
Possible fluctuation with blood pressure verses vertigo.  Start meclizine as needed.  Monitor blood pressure and blood sugar when these events occur.  Follow-up with Cardiology.  Referral to home health.

## 2022-01-15 NOTE — ASSESSMENT & PLAN NOTE
BMI is declining.  Increase protein calorie drink/supplements at home.  Increased weight-bearing activity.

## 2022-01-15 NOTE — PROGRESS NOTES
PLAN:      Problem List Items Addressed This Visit     Dizziness (Chronic)     Possible fluctuation with blood pressure verses vertigo.  Start meclizine as needed.  Monitor blood pressure and blood sugar when these events occur.  Follow-up with Cardiology.  Referral to home health.         Relevant Orders    Ambulatory referral/consult to Home Health    HOCM (hypertrophic obstructive cardiomyopathy) (Chronic)     Possible symptomatic dizziness from this condition.  Follow-up cardiology soon for evaluation.  Referral to home health for skilled nursing.         Relevant Orders    Ambulatory referral/consult to Home Health    Encounter for long-term (current) use of medications (Chronic)     Abnormal labs reviewed.  Referral to home health for further monitoring and assistance with medications.Complete history and physical was completed today.  Complete and thorough medication reconciliation was performed.  Discussed risks and benefits of medications.  Advised patient on orders and health maintenance.  We discussed old records and old labs if available.  Will request any records not available through epic.  Continue current medications listed on your summary sheet.           Relevant Medications    traMADoL (ULTRAM) 50 mg tablet    omeprazole (PRILOSEC) 20 MG capsule    atorvastatin (LIPITOR) 20 MG tablet    benazepriL (LOTENSIN) 10 MG tablet    clopidogreL (PLAVIX) 75 mg tablet    pregabalin (LYRICA) 75 MG capsule    Other Relevant Orders    Ambulatory referral/consult to Home Health    Other specified disorders of adrenal gland (Chronic)     Defer labs.  Continue to monitor for signs symptoms.         Relevant Orders    Ambulatory referral/consult to Home Health    Other nonthrombocytopenic purpura (Chronic)     Recommend Hematology consult based off abnormal CBC.         Relevant Orders    Ambulatory referral/consult to Home Health    Stage 3a chronic kidney disease (Chronic)     Increase hydration with water.  Need  to increase nutrition also BMI is decreased.  Electrolytes are abnormal due to decreased nutrition.  Avoid nephrotoxic agents.         Relevant Orders    Ambulatory referral/consult to Home Health    Oxygen dependent (Chronic)     Continue oxygen as prescribed by pulmonology.         Relevant Orders    Ambulatory referral/consult to Home Health    Decreased appetite (Chronic)     BMI is declining.  Increase protein calorie drink/supplements at home.  Increased weight-bearing activity.         Relevant Orders    Ambulatory referral/consult to Home Health    Hypersomnolence (Chronic)     Likely circadian rhythm imbalance.  Consider melatonin at bedtime.  Discussed condition course.         Relevant Orders    Ambulatory referral/consult to Home Health    Constipation (Chronic)     Increase mobility with exercise, increase hydration with water.  Start Linzess as needed for constipation.Discussed insomnia condition course.  Advised of first-line medications for this condition.  Also discussed sleep hygiene.  Information was given below.  Good sleep habits (sometimes referred to as sleep hygiene) can help you get a good nights sleep.    Some habits that can improve your sleep health:  -Be consistent. Go to bed at the same time each night and get up at the same time each morning, including on the weekends  -Make sure your bedroom is quiet, dark, relaxing, and at a comfortable temperature  -Remove electronic devices, such as TVs, computers, and smart phones, from the bedroom  -Avoid large meals, caffeine, and alcohol before bedtime  -Get some exercise. Being physically active during the day can help you fall asleep more easily at night.           Relevant Medications    linaCLOtide (LINZESS) 72 mcg Cap capsule    Other Relevant Orders    Ambulatory referral/consult to Home Health    Severe protein-calorie malnutrition (Chronic)     Increase high-calorie protein shakes.         Rib pain     Tramadol for severe pain.  No  fracture noted.  Patient still has tenderness in the right rib area.  Likely contusion.  Follow-up sooner if no improvement.  Consider repeat imaging if needed.The patient was checked in the [place] [employer]'s  Prescription Monitoring Program. There appears to be no incongruities with the patient's verbalized history.            Relevant Medications    traMADoL (ULTRAM) 50 mg tablet    Other Relevant Orders    Ambulatory referral/consult to Home Health    Fall     Referral to home health for physical therapy occupational therapy consult for evaluation and treatment.  Fall precautions.         Relevant Medications    traMADoL (ULTRAM) 50 mg tablet    Other Relevant Orders    Ambulatory referral/consult to Home Health    Dark urine     Start Cipro for potential early stages of UTI.  Increase hydration with water.  Referral to home health for Education and monitoring of symptoms.         Relevant Orders    Urinalysis, Reflex to Urine Culture Urine, Clean Catch (Completed)    Ambulatory referral/consult to Home Health      Other Visit Diagnoses     Well adult exam    -  Primary    Encounter for lipid screening for cardiovascular disease        Relevant Orders    Ambulatory referral/consult to Home Health        Future Appointments     Date Provider Specialty Appt Notes    2/3/2022  Lab     1/13/2023 José Manuel Tyler MD Family Medicine Annual         Medication Management for assessment above:   Medication List with Changes/Refills   New Medications    CIPROFLOXACIN HCL (CIPRO) 500 MG TABLET    Take 1 tablet (500 mg total) by mouth 2 (two) times daily. for 7 days    LINACLOTIDE (LINZESS) 72 MCG CAP CAPSULE    Take 1 capsule (72 mcg total) by mouth once daily.   Current Medications    ALBUTEROL (PROVENTIL/VENTOLIN HFA) 90 MCG/ACTUATION INHALER    Inhale 2 puffs into the lungs.    FERROUS SULFATE (FEOSOL) 325 MG (65 MG IRON) TAB TABLET    Take 1 tablet (325 mg total) by mouth daily with breakfast.     FLUTICASONE-UMECLIDIN-VILANTER (TRELEGY ELLIPTA) 100-62.5-25 MCG DSDV    Inhale 1 puff into the lungs.    IPRATROPIUM (ATROVENT HFA) 17 MCG/ACTUATION INHALER    Inhale 2 puffs into the lungs every 6 (six) hours. Rescue    MUPIROCIN (BACTROBAN) 2 % OINTMENT    Apply topically 2 (two) times daily.    NYSTATIN (MYCOSTATIN) 100,000 UNIT/ML SUSPENSION    swish and spit FOUR MILLILITERS BY MOUTH FOUR TIMES DAILY FOR 10 DAYS    OXYGEN-AIR DELIVERY SYSTEMS MISC    by Miscellaneous route Use as directed  2 L at home 3 L on ambulation.  MFallerLPN    SENNOSIDES (SENOKOT ORAL)    Take by mouth as needed.   Changed and/or Refilled Medications    Modified Medication Previous Medication    ATORVASTATIN (LIPITOR) 20 MG TABLET atorvastatin (LIPITOR) 20 MG tablet       Take 1 tablet (20 mg total) by mouth once daily.    TAKE 1 TABLET BY MOUTH ONCE DAILY    BENAZEPRIL (LOTENSIN) 10 MG TABLET benazepriL (LOTENSIN) 10 MG tablet       Take 1 tablet (10 mg total) by mouth once daily.    Take 1 tablet (10 mg total) by mouth once daily.    CLOPIDOGREL (PLAVIX) 75 MG TABLET clopidogreL (PLAVIX) 75 mg tablet       Take 1 tablet (75 mg total) by mouth once daily.    Take 1 tablet (75 mg total) by mouth once daily.    OMEPRAZOLE (PRILOSEC) 20 MG CAPSULE omeprazole (PRILOSEC) 20 MG capsule       Take 1 capsule (20 mg total) by mouth every evening.    TAKE ONE CAPSULE BY MOUTH EVERY EVENING    PREGABALIN (LYRICA) 75 MG CAPSULE pregabalin (LYRICA) 75 MG capsule       Take 1 capsule (75 mg total) by mouth 2 (two) times daily.    TAKE ONE CAPSULE BY MOUTH TWICE DAILY    TRAMADOL (ULTRAM) 50 MG TABLET traMADoL (ULTRAM) 50 mg tablet       Take 1 tablet (50 mg total) by mouth every 6 (six) hours as needed.    TAKE 1 TABLET BY MOUTH EVERY 6 HOURS AS NEEDED       José Manuel Tyler M.D.  ==========================================================================  Subjective:   Patient ID: Tamie Fink is a 89 y.o. female.  has a past medical history  of Allergy, Arthritis, Asthma, GERD (gastroesophageal reflux disease), HEARING LOSS, Hypertension, Lung disease, and Pneumonia.   Chief Complaint: Follow-up      Problem List Items Addressed This Visit     Dizziness (Chronic)    Overview     Chronic.  Intermittent control.  Patient reports dizziness when sitting down at the dinner table.  It occurs no other times.         Current Assessment & Plan     Possible fluctuation with blood pressure verses vertigo.  Start meclizine as needed.  Monitor blood pressure and blood sugar when these events occur.  Follow-up with Cardiology.  Referral to home health.         HOCM (hypertrophic obstructive cardiomyopathy) (Chronic)    Overview     Chronic.  Control is uncertain.  Patient follows with cardiology.  Denies any chest pain or palpitations today.    Echo from December 2019 shows midcavity gradient    Last Assessment & Plan:   We discussed this diagnosis today. I do not think that she is symptomatic and would not pursue any further diagnostic work or options for therapy           Current Assessment & Plan     Possible symptomatic dizziness from this condition.  Follow-up cardiology soon for evaluation.  Referral to home health for skilled nursing.         Encounter for long-term (current) use of medications (Chronic)    Overview     Initial HPI March 2020:  CHRONIC. Stable. Compliant with medications for managed conditions. See medication list. No SE reported. Routine lab analysis is being monitored. Refills were addressed.OCTOBER 2020:  CHRONIC. Stable. Compliant with medications for managed conditions. See medication list. No SE reported. Routine lab analysis is being monitored. Refills were addressed.  January 2022:  Reviewed labs.  Lab Results   Component Value Date    WBC 14.64 (H) 01/14/2022    HGB 9.5 (L) 01/14/2022    HCT 31.7 (L) 01/14/2022    MCV 94 01/14/2022     01/14/2022         Chemistry        Component Value Date/Time     (L) 01/14/2022 1517     K 4.7 01/14/2022 1517    CL 90 (L) 01/14/2022 1517    CO2 32 (H) 01/14/2022 1517    BUN 17 01/14/2022 1517    CREATININE 0.7 01/14/2022 1517     01/14/2022 1517        Component Value Date/Time    CALCIUM 9.1 01/14/2022 1517    ALKPHOS 79 01/14/2022 1517    AST 19 01/14/2022 1517    ALT 9 (L) 01/14/2022 1517    BILITOT 0.2 01/14/2022 1517    ESTGFRAFRICA >60.0 01/14/2022 1517    EGFRNONAA >60.0 01/14/2022 1517          Lab Results   Component Value Date    TSH 1.075 01/14/2022    N3ZIFGY 5.8 12/03/2014    FREET4 0.94 02/11/2015    T3FREE 2.8 02/11/2015       =================================================         Current Assessment & Plan     Abnormal labs reviewed.  Referral to home health for further monitoring and assistance with medications.Complete history and physical was completed today.  Complete and thorough medication reconciliation was performed.  Discussed risks and benefits of medications.  Advised patient on orders and health maintenance.  We discussed old records and old labs if available.  Will request any records not available through epic.  Continue current medications listed on your summary sheet.           Other specified disorders of adrenal gland (Chronic)    Overview     Chronic.  Control is uncertain.  Patient and family defer any further testing for this condition.  Denies any symptoms of adrenal insufficiency or hyperactivity.         Current Assessment & Plan     Defer labs.  Continue to monitor for signs symptoms.         Other nonthrombocytopenic purpura (Chronic)    Overview     Chronic.  Stable.  Lab Results   Component Value Date    WBC 14.64 (H) 01/14/2022    HGB 9.5 (L) 01/14/2022    HCT 31.7 (L) 01/14/2022    MCV 94 01/14/2022     01/14/2022                Current Assessment & Plan     Recommend Hematology consult based off abnormal CBC.         Stage 3a chronic kidney disease (Chronic)    Overview     Chronic.  GFR stable.    BMP  Lab Results   Component Value  Date     (L) 01/14/2022    K 4.7 01/14/2022    CL 90 (L) 01/14/2022    CO2 32 (H) 01/14/2022    BUN 17 01/14/2022    CREATININE 0.7 01/14/2022    CALCIUM 9.1 01/14/2022    ANIONGAP 9 01/14/2022    ESTGFRAFRICA >60.0 01/14/2022    EGFRNONAA >60.0 01/14/2022              Current Assessment & Plan     Increase hydration with water.  Need to increase nutrition also BMI is decreased.  Electrolytes are abnormal due to decreased nutrition.  Avoid nephrotoxic agents.         Oxygen dependent (Chronic)    Overview     Chronic.  Stable.  Patient is on 3 liters pulse dose portable at this time.  2 liters nasal cannula at home.  Following with pulmonology.  Recently was seen by Pulmonary.         Current Assessment & Plan     Continue oxygen as prescribed by pulmonology.         Decreased appetite (Chronic)    Overview     BMI Readings from Last 10 Encounters:   01/14/22 15.02 kg/m²   01/26/21 17.87 kg/m²   01/05/21 16.65 kg/m²   03/06/20 17.99 kg/m²   11/12/19 20.08 kg/m²   09/05/19 20.46 kg/m²   08/07/19 20.77 kg/m²   07/17/19 20.78 kg/m²   03/13/19 20.76 kg/m²   03/13/19 20.77 kg/m²              Current Assessment & Plan     BMI is declining.  Increase protein calorie drink/supplements at home.  Increased weight-bearing activity.         Hypersomnolence (Chronic)    Overview     Chronic.  Worsening.  Patient is wearing oxygen at home.  Reports sleeping much throughout the day but unable to sleep at night.         Current Assessment & Plan     Likely circadian rhythm imbalance.  Consider melatonin at bedtime.  Discussed condition course.         Constipation (Chronic)    Overview     Chronic.  Uncontrolled.  Patient reports that she has not been eating or drinking much but she can go several days or weeks without bowel movement.  She has tried laxatives without relief at home.         Current Assessment & Plan     Increase mobility with exercise, increase hydration with water.  Start Linzess as needed for  constipation.Discussed insomnia condition course.  Advised of first-line medications for this condition.  Also discussed sleep hygiene.  Information was given below.  Good sleep habits (sometimes referred to as sleep hygiene) can help you get a good nights sleep.    Some habits that can improve your sleep health:  -Be consistent. Go to bed at the same time each night and get up at the same time each morning, including on the weekends  -Make sure your bedroom is quiet, dark, relaxing, and at a comfortable temperature  -Remove electronic devices, such as TVs, computers, and smart phones, from the bedroom  -Avoid large meals, caffeine, and alcohol before bedtime  -Get some exercise. Being physically active during the day can help you fall asleep more easily at night.           Severe protein-calorie malnutrition (Chronic)    Overview     Results for GILMA MAHMOOD (MRN [id number]) as of 1/15/2022 13:44   Ref. Range 10/11/2011 15:21 3/30/2012 08:57 5/31/2013 08:55 12/3/2014 15:27 10/2/2015 14:51 6/13/2017 14:49 1/26/2021 15:09 1/14/2022 15:17   PROTEIN TOTAL Latest Ref Range: 6.0 - 8.4 g/dL 7.3 7.3 6.7 7.3  7.6 8.1 8.2   Albumin Latest Ref Range: 3.5 - 5.2 g/dL 3.6 3.3 (L) 2.8 (L) 3.6  3.4 (L) 3.0 (L) 2.4 (L)            Current Assessment & Plan     Increase high-calorie protein shakes.         Rib pain    Overview     New problem.  Patient recently fell and hit her rib/back.    X-Ray Ribs 3 Views Bilateral  Narrative: EXAMINATION:  XR CHEST PA AND LATERAL; XR RIBS 3 VIEWS BILATERAL    CLINICAL HISTORY:  Unspecified fall, initial encounter    TECHNIQUE:  PA and lateral views of the chest were performed.  Three views of both ribs were also performed.    COMPARISON:  Chest radiographs dating back to June 4, 2013    FINDINGS:  There are chronic bilateral patchy upper lobe predominant interstitial and airspace opacities, greater on the left than right, similar in comparison to prior examinations dating back to 2013.   There are multiple lucencies in these areas of opacity possibly representing superimposed areas of cavitation or bronchiectasis.  No definite acute opacity within either lung.  There is new blunting of the left costophrenic angle which possibly represents a small left effusion versus pleural scarring.  No right effusion.  No pneumothorax.  Heart size is normal.  Atherosclerotic calcifications present within the thoracic aorta.  No acute osseous abnormality.  Specifically, there is no definite rib fracture identified.  Impression: As above.    Electronically signed by: Miguel Mcgrath  Date:    01/10/2022  Time:    12:48  X-Ray Chest PA And Lateral  Narrative: EXAMINATION:  XR CHEST PA AND LATERAL; XR RIBS 3 VIEWS BILATERAL    CLINICAL HISTORY:  Unspecified fall, initial encounter    TECHNIQUE:  PA and lateral views of the chest were performed.  Three views of both ribs were also performed.    COMPARISON:  Chest radiographs dating back to June 4, 2013    FINDINGS:  There are chronic bilateral patchy upper lobe predominant interstitial and airspace opacities, greater on the left than right, similar in comparison to prior examinations dating back to 2013.  There are multiple lucencies in these areas of opacity possibly representing superimposed areas of cavitation or bronchiectasis.  No definite acute opacity within either lung.  There is new blunting of the left costophrenic angle which possibly represents a small left effusion versus pleural scarring.  No right effusion.  No pneumothorax.  Heart size is normal.  Atherosclerotic calcifications present within the thoracic aorta.  No acute osseous abnormality.  Specifically, there is no definite rib fracture identified.  Impression: As above.    Electronically signed by: Miguel Mcgrath  Date:    01/10/2022  Time:    12:48             Current Assessment & Plan     Tramadol for severe pain.  No fracture noted.  Patient still has tenderness in the right rib area.   Likely contusion.  Follow-up sooner if no improvement.  Consider repeat imaging if needed.The patient was checked in the [facility] [employer] [facility]'s  Prescription Monitoring Program. There appears to be no incongruities with the patient's verbalized history.            Fall    Overview     Recurrent falls.  Patient is using assistive walking device at home.         Current Assessment & Plan     Referral to home health for physical therapy occupational therapy consult for evaluation and treatment.  Fall precautions.         Dark urine    Overview     Results for GILMA MAHMOOD (MRN [id number]) as of 1/15/2022 13:44   Ref. Range 1/14/2022 15:33   Specimen UA Unknown Urine, Clean Catch   Color, UA Latest Ref Range: Yellow, Straw, Marija  Yellow   Appearance, UA Latest Ref Range: Clear  Clear   Specific [place], UA Latest Ref Range: 1.005 - 1.030  1.015   pH, UA Latest Ref Range: 5.0 - 8.0  6.5   Protein, UA Latest Ref Range: Negative  1+ (A)   Glucose, UA Latest Ref Range: Negative  Negative   Ketones, UA Latest Ref Range: Negative  Negative   Occult Blood UA Latest Ref Range: Negative  Trace (A)   NITRITE UA Latest Ref Range: Negative  Negative   Bilirubin (UA) Latest Ref Range: Negative  Negative   Leukocytes, UA Latest Ref Range: Negative  Negative   RBC, UA Latest Ref Range: 0 - 4 /hpf 12 (H)   WBC, UA Latest Ref Range: 0 - 5 /hpf 7 (H)   Bacteria, UA Latest Ref Range: None-Occ /hpf Occasional   Squam Epithel, UA Latest Units: /hpf 2   Hyaline Casts, UA Latest Ref Range: 0-1/lpf /lpf 0   Non-Squam Epith Latest Ref Range: <1/hpf /hpf <1            Current Assessment & Plan     Start Cipro for potential early stages of UTI.  Increase hydration with water.  Referral to home health for Education and monitoring of symptoms.           Other Visit Diagnoses     Well adult exam    -  Primary    Encounter for lipid screening for cardiovascular disease               Review of patient's allergies indicates:   Allergen  Reactions    Montelukast Other (See Comments)     Weakness and disorientation      Current Outpatient Medications   Medication Instructions    albuterol (PROVENTIL/VENTOLIN HFA) 90 mcg/actuation inhaler 2 puffs, Inhalation    atorvastatin (LIPITOR) 20 mg, Oral, Daily    benazepriL (LOTENSIN) 10 mg, Oral, Daily    ciprofloxacin HCl (CIPRO) 500 mg, Oral, 2 times daily    clopidogreL (PLAVIX) 75 mg, Oral, Daily    ferrous sulfate (FEOSOL) 325 mg, Oral, With breakfast    fluticasone-umeclidin-vilanter (TRELEGY ELLIPTA) 100-62.5-25 mcg DsDv 1 puff, Inhalation    ipratropium (ATROVENT HFA) 17 mcg/actuation inhaler 2 puffs, Inhalation, Every 6 hours, Rescue     linaCLOtide (LINZESS) 72 mcg, Oral, Daily    mupirocin (BACTROBAN) 2 % ointment Topical (Top), 2 times daily    nystatin (MYCOSTATIN) 100,000 unit/mL suspension swish and spit FOUR MILLILITERS BY MOUTH FOUR TIMES DAILY FOR 10 DAYS    omeprazole (PRILOSEC) 20 mg, Oral, Nightly    OXYGEN-AIR DELIVERY SYSTEMS MISC by Miscellaneous route Use as directed  2 L at home 3 L on ambulation.  MFallerLPN    pregabalin (LYRICA) 75 mg, Oral, 2 times daily    SENNOSIDES (SENOKOT ORAL) Oral, As needed (PRN)    traMADoL (ULTRAM) 50 mg, Oral, Every 6 hours PRN      I have reviewed the PMH, social history, FamilyHx, surgical history, allergies and medications documented / confirmed by the patient at the time of this visit.  Review of Systems   Constitutional: Positive for activity change. Negative for chills, fatigue, fever and unexpected weight change.   HENT: Negative for ear pain and sore throat.    Eyes: Negative for redness and visual disturbance.   Respiratory: Positive for cough, shortness of breath and wheezing.    Cardiovascular: Negative for chest pain and palpitations.   Gastrointestinal: Negative for nausea and vomiting.   Genitourinary: Negative for difficulty urinating and hematuria.   Musculoskeletal: Positive for arthralgias, back pain, gait problem  "and myalgias.   Skin: Negative for rash and wound.   Neurological: Positive for weakness. Negative for headaches.   Psychiatric/Behavioral: Positive for sleep disturbance. The patient is not nervous/anxious.      Objective:   /60   Pulse 107   Temp 99 °F (37.2 °C) (Temporal)   Resp 18   Ht 5' 4" (1.626 m)   Wt 39.7 kg (87 lb 8 oz)   SpO2 (!) 90%   PF (!) 3 L/min   BMI 15.02 kg/m²   Physical Exam  Vitals and nursing note reviewed.   Constitutional:       General: She is not in acute distress.     Appearance: She is well-developed. She is ill-appearing (Chronically). She is not toxic-appearing or diaphoretic.      Comments: On nasal cannula portable oxygen 3 liters   HENT:      Head: Normocephalic and atraumatic.      Right Ear: Hearing and external ear normal.      Left Ear: Hearing and external ear normal.   Eyes:      General: Lids are normal.      Conjunctiva/sclera: Conjunctivae normal.      Pupils: Pupils are equal, round, and reactive to light.   Cardiovascular:      Rate and Rhythm: Normal rate and regular rhythm.      Heart sounds: Normal heart sounds. No murmur heard.      Pulmonary:      Effort: Pulmonary effort is normal. No respiratory distress.      Breath sounds: Wheezing present.   Chest:      Chest wall: No tenderness.   Abdominal:      General: Bowel sounds are normal.      Palpations: Abdomen is soft.   Musculoskeletal:         General: Tenderness and deformity present. Normal range of motion.      Cervical back: Normal range of motion and neck supple.   Skin:     General: Skin is warm and dry.      Capillary Refill: Capillary refill takes less than 2 seconds.      Coloration: Skin is not pale.   Neurological:      Mental Status: She is alert and oriented to person, place, and time. She is not disoriented.      GCS: GCS eye subscore is 4. GCS verbal subscore is 5. GCS motor subscore is 6.      Cranial Nerves: No cranial nerve deficit.      Sensory: No sensory deficit.      Motor: " Atrophy present. No tremor.      Coordination: Coordination abnormal.      Gait: Gait abnormal.      Comments: Generalized weakness throughout upper extremity and lower extremities.  No slurring of speech   Psychiatric:         Attention and Perception: She is attentive.         Mood and Affect: Mood is not anxious or depressed.         Speech: Speech is not rapid and pressured or slurred.         Behavior: Behavior normal. Behavior is not agitated, aggressive or hyperactive. Behavior is cooperative.         Thought Content: Thought content normal. Thought content is not paranoid or delusional. Thought content does not include homicidal or suicidal ideation. Thought content does not include homicidal or suicidal plan.         Cognition and Memory: Memory is not impaired.         Judgment: Judgment normal.        Patient is wearing a mask which limits physical exam due to COVID restrictions.   Assessment:     1. Well adult exam    2. Encounter for long-term (current) use of medications    3. Encounter for lipid screening for cardiovascular disease    4. Other nonthrombocytopenic purpura    5. Stage 3a chronic kidney disease    6. Other specified disorders of adrenal gland    7. HOCM (hypertrophic obstructive cardiomyopathy)    8. Fall, initial encounter    9. Rib pain    10. Oxygen dependent    11. Dizziness    12. Decreased appetite    13. Hypersomnolence    14. Constipation, unspecified constipation type    15. Dark urine    16. Severe protein-calorie malnutrition      MDM:   Moderate to high complexity.  Moderate risk.  Total time: 45 minutes.  This includes total time spent on the encounter, which includes face to face time and non-face to face time preparing to see the patient (eg, review of previous medical records, tests), Obtaining and/or reviewing separately obtained history, documenting clinical information in the electronic or other health record, independently interpreting results (not separately  reported)/communicating results to the patient/family/caregiver, and/or care coordination (not separately reported).    I have Reviewed and summarized old records.  I have performed thorough medication reconciliation today and discussed risk and benefits of medications.  I have reviewed labs and discussed with patient.  All questions were answered.  I am requesting old records and will review them once they are available.Pulmonary Dr. Flores  Cardiology Dr. Wooten    I have signed for the following orders AND/OR meds.  Orders Placed This Encounter   Procedures    Urinalysis, Reflex to Urine Culture Urine, Clean Catch     Standing Status:   Future     Number of Occurrences:   1     Standing Expiration Date:   7/14/2023     Order Specific Question:   Preferred Collection Type     Answer:   Urine, Clean Catch     Order Specific Question:   Specimen Source     Answer:   Urine    Ambulatory referral/consult to Home Health     Standing Status:   Future     Standing Expiration Date:   2/14/2023     Referral Priority:   Urgent     Referral Type:   Home Health     Referral Reason:   Specialty Services Required     Referred to Provider:   [facility]     Requested Specialty:   Home Health Services     Number of Visits Requested:   1     Medications Ordered This Encounter   Medications    atorvastatin (LIPITOR) 20 MG tablet     Sig: Take 1 tablet (20 mg total) by mouth once daily.     Dispense:  90 tablet     Refill:  4    benazepriL (LOTENSIN) 10 MG tablet     Sig: Take 1 tablet (10 mg total) by mouth once daily.     Dispense:  90 tablet     Refill:  4     .    clopidogreL (PLAVIX) 75 mg tablet     Sig: Take 1 tablet (75 mg total) by mouth once daily.     Dispense:  90 tablet     Refill:  4    linaCLOtide (LINZESS) 72 mcg Cap capsule     Sig: Take 1 capsule (72 mcg total) by mouth once daily.     Dispense:  30 capsule     Refill:  1    omeprazole (PRILOSEC) 20 MG capsule     Sig: Take 1 capsule (20 mg  total) by mouth every evening.     Dispense:  90 capsule     Refill:  4    pregabalin (LYRICA) 75 MG capsule     Sig: Take 1 capsule (75 mg total) by mouth 2 (two) times daily.     Dispense:  60 capsule     Refill:  5    traMADoL (ULTRAM) 50 mg tablet     Sig: Take 1 tablet (50 mg total) by mouth every 6 (six) hours as needed.     Dispense:  120 tablet     Refill:  1     Medically necessary        Follow up in about 1 year (around 1/14/2023), or if symptoms worsen or fail to improve, for Annual Wellness Exam.    If no improvement in symptoms or symptoms worsen, advised to call/follow-up at clinic or go to ER. Patient voiced understanding and all questions/concerns were addressed.   DISCLAIMER: This note was compiled by using a speech recognition dictation system and therefore please be aware that typographical / speech recognition errors can and do occur.  Please contact me if you see any errors specifically.    José Manuel Tyler M.D.       Office: [phone] [id number] [place] [address]  FAX: [phone]

## 2022-01-15 NOTE — ASSESSMENT & PLAN NOTE
Tramadol for severe pain.  No fracture noted.  Patient still has tenderness in the right rib area.  Likely contusion.  Follow-up sooner if no improvement.  Consider repeat imaging if needed.The patient was checked in the [facility] [employer] [facility] [employer]'s  Prescription Monitoring Program. There appears to be no incongruities with the patient's verbalized history.

## 2022-01-15 NOTE — ASSESSMENT & PLAN NOTE
Possible symptomatic dizziness from this condition.  Follow-up cardiology soon for evaluation.  Referral to home health for skilled nursing.

## 2022-01-15 NOTE — ASSESSMENT & PLAN NOTE
Increase mobility with exercise, increase hydration with water.  Start Linzess as needed for constipation.Discussed insomnia condition course.  Advised of first-line medications for this condition.  Also discussed sleep hygiene.  Information was given below.  Good sleep habits (sometimes referred to as sleep hygiene) can help you get a good nights sleep.    Some habits that can improve your sleep health:  -Be consistent. Go to bed at the same time each night and get up at the same time each morning, including on the weekends  -Make sure your bedroom is quiet, dark, relaxing, and at a comfortable temperature  -Remove electronic devices, such as TVs, computers, and smart phones, from the bedroom  -Avoid large meals, caffeine, and alcohol before bedtime  -Get some exercise. Being physically active during the day can help you fall asleep more easily at night.

## 2022-01-15 NOTE — ASSESSMENT & PLAN NOTE
Start Cipro for potential early stages of UTI.  Increase hydration with water.  Referral to home health for Education and monitoring of symptoms.

## 2022-01-19 ENCOUNTER — TELEPHONE (OUTPATIENT)
Dept: FAMILY MEDICINE | Facility: CLINIC | Age: 87
End: 2022-01-19
Payer: MEDICARE

## 2022-01-19 DIAGNOSIS — D64.9 ANEMIA, UNSPECIFIED TYPE: Primary | ICD-10-CM

## 2022-01-19 NOTE — TELEPHONE ENCOUNTER
Spoke with patient about results.  She does not currently have a hematologist.  See orders attached.  Patient wants to stay local for Hematology.

## 2022-01-19 NOTE — TELEPHONE ENCOUNTER
I have signed for the following orders AND/OR meds.  Please call the patient and ask the patient to schedule the testing AND/OR inform about any medications that were sent.      Orders Placed This Encounter   Procedures    Ambulatory referral/consult to Hematology / Oncology     Standing Status:   Future     Standing Expiration Date:   2/19/2023     Referral Priority:   Routine     Referral Type:   Consultation     Referral Reason:   Specialty Services Required     Requested Specialty:   Hematology and Oncology     Number of Visits Requested:   1

## 2022-01-21 ENCOUNTER — TELEPHONE (OUTPATIENT)
Dept: FAMILY MEDICINE | Facility: CLINIC | Age: 87
End: 2022-01-21
Payer: MEDICARE

## 2022-01-21 NOTE — TELEPHONE ENCOUNTER
[facility] is not in net work, [facility] will [facility] to see which one has availability

## 2022-01-21 NOTE — TELEPHONE ENCOUNTER
----- Message from Carmen Patino sent at 1/21/2022 11:06 AM CST -----  Regarding: home health  Contact: [facility]  Ms Fu states that [facility] is not contracted with them, so another home health agency will be needed, please call her back at [phone]

## 2022-01-21 NOTE — TELEPHONE ENCOUNTER
----- Message from Zachary Lynn sent at 1/21/2022 12:47 PM CST -----  Contact: jhon from [facility]  States she's calling back to let the nurse know that [facility] accepted the referral and can be reached at [phone]//[email or web address]

## 2022-01-22 PROCEDURE — G0180 PR HOME HEALTH MD CERTIFICATION: ICD-10-PCS | Mod: ,,, | Performed by: FAMILY MEDICINE

## 2022-01-22 PROCEDURE — G0180 MD CERTIFICATION HHA PATIENT: HCPCS | Mod: ,,, | Performed by: FAMILY MEDICINE

## 2022-01-24 ENCOUNTER — PES CALL (OUTPATIENT)
Dept: ADMINISTRATIVE | Facility: CLINIC | Age: 87
End: 2022-01-24
Payer: MEDICARE

## 2022-01-25 DIAGNOSIS — N30.01 ACUTE CYSTITIS WITH HEMATURIA: ICD-10-CM

## 2022-01-25 RX ORDER — CIPROFLOXACIN 500 MG/1
TABLET ORAL
Qty: 14 TABLET | Refills: 0 | OUTPATIENT
Start: 2022-01-25

## 2022-01-28 ENCOUNTER — EXTERNAL HOME HEALTH (OUTPATIENT)
Dept: HOME HEALTH SERVICES | Facility: HOSPITAL | Age: 87
End: 2022-01-28
Payer: MEDICARE

## 2022-02-03 ENCOUNTER — LAB VISIT (OUTPATIENT)
Dept: LAB | Facility: HOSPITAL | Age: 87
End: 2022-02-03
Attending: FAMILY MEDICINE
Payer: MEDICARE

## 2022-02-03 DIAGNOSIS — N39.0 URINARY TRACT INFECTION, SITE NOT SPECIFIED: Primary | ICD-10-CM

## 2022-02-03 DIAGNOSIS — N18.31 STAGE 3A CHRONIC KIDNEY DISEASE: ICD-10-CM

## 2022-02-03 LAB
BASOPHILS # BLD AUTO: 0.05 K/UL (ref 0–0.2)
BASOPHILS NFR BLD: 0.6 % (ref 0–1.9)
BILIRUB UR QL STRIP: NEGATIVE
CLARITY UR REFRACT.AUTO: CLEAR
COLOR UR AUTO: YELLOW
DIFFERENTIAL METHOD: ABNORMAL
EOSINOPHIL # BLD AUTO: 0.1 K/UL (ref 0–0.5)
EOSINOPHIL NFR BLD: 1.6 % (ref 0–8)
ERYTHROCYTE [DISTWIDTH] IN BLOOD BY AUTOMATED COUNT: 15.8 % (ref 11.5–14.5)
GLUCOSE UR QL STRIP: NEGATIVE
HCT VFR BLD AUTO: 33.5 % (ref 37–48.5)
HGB BLD-MCNC: 9.9 G/DL (ref 12–16)
HGB UR QL STRIP: ABNORMAL
IMM GRANULOCYTES # BLD AUTO: 0.03 K/UL (ref 0–0.04)
IMM GRANULOCYTES NFR BLD AUTO: 0.3 % (ref 0–0.5)
KETONES UR QL STRIP: NEGATIVE
LEUKOCYTE ESTERASE UR QL STRIP: NEGATIVE
LYMPHOCYTES # BLD AUTO: 1.5 K/UL (ref 1–4.8)
LYMPHOCYTES NFR BLD: 16.8 % (ref 18–48)
MCH RBC QN AUTO: 28.8 PG (ref 27–31)
MCHC RBC AUTO-ENTMCNC: 29.6 G/DL (ref 32–36)
MCV RBC AUTO: 97 FL (ref 82–98)
MICROSCOPIC COMMENT: NORMAL
MONOCYTES # BLD AUTO: 0.7 K/UL (ref 0.3–1)
MONOCYTES NFR BLD: 8.5 % (ref 4–15)
NEUTROPHILS # BLD AUTO: 6.3 K/UL (ref 1.8–7.7)
NEUTROPHILS NFR BLD: 72.2 % (ref 38–73)
NITRITE UR QL STRIP: NEGATIVE
NRBC BLD-RTO: 0 /100 WBC
PH UR STRIP: 7 [PH] (ref 5–8)
PLATELET # BLD AUTO: 363 K/UL (ref 150–450)
PMV BLD AUTO: 11.8 FL (ref 9.2–12.9)
PROT UR QL STRIP: NEGATIVE
RBC # BLD AUTO: 3.44 M/UL (ref 4–5.4)
RBC #/AREA URNS AUTO: 2 /HPF (ref 0–4)
SP GR UR STRIP: 1.01 (ref 1–1.03)
SQUAMOUS #/AREA URNS AUTO: 0 /HPF
URN SPEC COLLECT METH UR: ABNORMAL
WBC # BLD AUTO: 8.75 K/UL (ref 3.9–12.7)
WBC #/AREA URNS AUTO: 1 /HPF (ref 0–5)

## 2022-02-03 PROCEDURE — 85025 COMPLETE CBC W/AUTO DIFF WBC: CPT | Performed by: FAMILY MEDICINE

## 2022-02-03 PROCEDURE — 81001 URINALYSIS AUTO W/SCOPE: CPT | Performed by: FAMILY MEDICINE

## 2022-02-03 PROCEDURE — 87086 URINE CULTURE/COLONY COUNT: CPT | Performed by: FAMILY MEDICINE

## 2022-02-03 PROCEDURE — 80053 COMPREHEN METABOLIC PANEL: CPT | Performed by: FAMILY MEDICINE

## 2022-02-04 ENCOUNTER — PATIENT MESSAGE (OUTPATIENT)
Dept: FAMILY MEDICINE | Facility: CLINIC | Age: 87
End: 2022-02-04
Payer: MEDICARE

## 2022-02-04 DIAGNOSIS — R31.9 HEMATURIA, UNSPECIFIED TYPE: Primary | ICD-10-CM

## 2022-02-04 LAB
ALBUMIN SERPL BCP-MCNC: 2.8 G/DL (ref 3.5–5.2)
ALP SERPL-CCNC: 86 U/L (ref 55–135)
ALT SERPL W/O P-5'-P-CCNC: 9 U/L (ref 10–44)
ANION GAP SERPL CALC-SCNC: 6 MMOL/L (ref 8–16)
AST SERPL-CCNC: 14 U/L (ref 10–40)
BILIRUB SERPL-MCNC: 0.2 MG/DL (ref 0.1–1)
BUN SERPL-MCNC: 13 MG/DL (ref 10–30)
CALCIUM SERPL-MCNC: 8.9 MG/DL (ref 8.7–10.5)
CHLORIDE SERPL-SCNC: 95 MMOL/L (ref 95–110)
CO2 SERPL-SCNC: 38 MMOL/L (ref 23–29)
CREAT SERPL-MCNC: 0.7 MG/DL (ref 0.5–1.4)
EST. GFR  (AFRICAN AMERICAN): >60 ML/MIN/1.73 M^2
EST. GFR  (NON AFRICAN AMERICAN): >60 ML/MIN/1.73 M^2
GLUCOSE SERPL-MCNC: 82 MG/DL (ref 70–110)
POTASSIUM SERPL-SCNC: 4.3 MMOL/L (ref 3.5–5.1)
PROT SERPL-MCNC: 8.4 G/DL (ref 6–8.4)
SODIUM SERPL-SCNC: 139 MMOL/L (ref 136–145)

## 2022-02-04 NOTE — PROGRESS NOTES
Make follow-up lab appointment per recommendation below.  Check to see if patient has seen the results through my chart.  If not then,  #CALL THE PATIENT# to discuss results/see if they have questions and document verification of contact. Make F/U appt if needed. [phone]    #My interpretation that was sent to them through [employer]:  [place], I have reviewed your recent blood work.     Urinalysis is abnormal with persistent blood.  Follow up with Urology.  No infection.  Your complete blood count is stable.  Moderate anemia persists.  Your metabolic panel which shows your glucose, kidney function, electrolytes, and liver function is stable.  Albumin is too low.  Increase protein in diet.  =========================  Also please address any outstanding health maintenance that may be due: Pneumococcal Vaccines (Age 65+)(1 of 2 - PPSV23) Never done  TETANUS VACCINE Never done  Shingles Vaccine(1 of 2) Never done  Mammogram due on 03/13/2020  COVID-19 Vaccine(3 - Booster for Pfizer series) due on 08/17/2021  Influenza Vaccine(1) due on 09/01/2021

## 2022-02-04 NOTE — TELEPHONE ENCOUNTER
I have signed for the following orders AND/OR meds.  Please call the patient and ask the patient to schedule the testing AND/OR inform about any medications that were sent.      Orders Placed This Encounter   Procedures    Ambulatory referral/consult to Urology     Standing Status:   Future     Standing Expiration Date:   3/4/2023     Referral Priority:   Routine     Referral Type:   Consultation     Referral Reason:   Specialty Services Required     Requested Specialty:   Urology     Number of Visits Requested:   1

## 2022-02-05 LAB — BACTERIA UR CULT: NO GROWTH

## 2022-02-05 NOTE — PROGRESS NOTES
1st check to see if patient has seen the results.  If not then  CALL patient with results and Document verification.  Schedule follow-up if needed.  [phone]  Urine culture shows no growth of bacteria.  Follow-up with Urology .

## 2022-02-18 ENCOUNTER — TELEPHONE (OUTPATIENT)
Dept: FAMILY MEDICINE | Facility: CLINIC | Age: 87
End: 2022-02-18
Payer: MEDICARE

## 2022-02-18 NOTE — TELEPHONE ENCOUNTER
----- Message from Evelio Christensen sent at 2/18/2022  1:41 PM CST -----  Contact: Ashley/ [facility]  Ashley would like a call back at [phone], in regards to the patient having 2 episodes of uncontrollable jerking movements that last 5 minutes.

## 2022-02-18 NOTE — TELEPHONE ENCOUNTER
Spoke with patient about recommendations and she expressed understanding and agreed that she would go to the ER if she has another episode.

## 2022-02-21 ENCOUNTER — DOCUMENT SCAN (OUTPATIENT)
Dept: HOME HEALTH SERVICES | Facility: HOSPITAL | Age: 87
End: 2022-02-21
Payer: MEDICARE

## 2022-02-25 ENCOUNTER — DOCUMENT SCAN (OUTPATIENT)
Dept: HOME HEALTH SERVICES | Facility: HOSPITAL | Age: 87
End: 2022-02-25
Payer: MEDICARE

## 2022-03-16 ENCOUNTER — OFFICE VISIT (OUTPATIENT)
Dept: FAMILY MEDICINE | Facility: CLINIC | Age: 87
End: 2022-03-16
Payer: MEDICARE

## 2022-03-16 DIAGNOSIS — R63.0 DECREASED APPETITE: Primary | ICD-10-CM

## 2022-03-16 DIAGNOSIS — K08.9 POOR DENTITION: ICD-10-CM

## 2022-03-16 DIAGNOSIS — Z99.81 CHRONIC RESPIRATORY FAILURE WITH HYPOXIA, ON HOME OXYGEN THERAPY: Chronic | ICD-10-CM

## 2022-03-16 DIAGNOSIS — J44.9 CHRONIC OBSTRUCTIVE PULMONARY DISEASE, UNSPECIFIED COPD TYPE: Chronic | ICD-10-CM

## 2022-03-16 DIAGNOSIS — J96.11 CHRONIC RESPIRATORY FAILURE WITH HYPOXIA, ON HOME OXYGEN THERAPY: Chronic | ICD-10-CM

## 2022-03-16 PROCEDURE — 1159F MED LIST DOCD IN RCRD: CPT | Mod: CPTII,95,, | Performed by: NURSE PRACTITIONER

## 2022-03-16 PROCEDURE — 99442 PR PHYSICIAN TELEPHONE EVALUATION 11-20 MIN: ICD-10-PCS | Mod: 95,,, | Performed by: NURSE PRACTITIONER

## 2022-03-16 PROCEDURE — 99442 PR PHYSICIAN TELEPHONE EVALUATION 11-20 MIN: CPT | Mod: 95,,, | Performed by: NURSE PRACTITIONER

## 2022-03-16 PROCEDURE — 1160F PR REVIEW ALL MEDS BY PRESCRIBER/CLIN PHARMACIST DOCUMENTED: ICD-10-PCS | Mod: CPTII,95,, | Performed by: NURSE PRACTITIONER

## 2022-03-16 PROCEDURE — 1159F PR MEDICATION LIST DOCUMENTED IN MEDICAL RECORD: ICD-10-PCS | Mod: CPTII,95,, | Performed by: NURSE PRACTITIONER

## 2022-03-16 PROCEDURE — 1160F RVW MEDS BY RX/DR IN RCRD: CPT | Mod: CPTII,95,, | Performed by: NURSE PRACTITIONER

## 2022-03-16 NOTE — PROGRESS NOTES
"Established Patient - Audio Only Telehealth Visit     The patient location is: [place]   The chief complaint leading to consultation is: Decrease appetite   Visit type: Virtual visit with audio only (telephone)  Total time spent with patient: 17 minutes      The reason for the audio only service rather than synchronous audio and video virtual visit was related to technical difficulties or patient preference/necessity.     Each patient to whom I provide medical services by telemedicine is:  (1) informed of the relationship between the physician and patient and the respective role of any other health care provider with respect to management of the patient; and (2) notified that they may decline to receive medical services by telemedicine and may withdraw from such care at any time. Patient verbally consented to receive this service via voice-only telephone call.     HPI: Patient is a 89-year old female who presents with complaint of decreased appetite. Assistance in history provided by daughter, Laura. For the last several months patient has had decrease in appetite associated with difficulty chewing. She reports dental problems and currently has partial denture which does not fit properly and will occasionally fall out while eating. She plans to see the dentist for this concern next Monday. She also reports that while eating she gets weak, out of breath, and no longer has the energy or desire to eat. She is currently home on supplemental oxygen and is established with pulmonology for past history of COPD. She is using Trelegy and Atrovent inhaler. Albuterol inhaler PRN for rescue.      Assessment and plan:    Recommend small, frequent snacking  Soft-dental diet and "finger" foods or easy to eat items   Ensure or Boost supplemental shakes  Proceed with evaluation by dentist next week  Follow up with pulmonology  Follow up with PCP if no improvement or worsening of symptoms.     This service was not originating from a " related E/M service provided within the previous 7 days nor will  to an E/M service or procedure within the next 24 hours or my soonest available appointment.  Prevailing standard of care was able to be met in this audio-only visit.

## 2022-03-21 ENCOUNTER — DOCUMENT SCAN (OUTPATIENT)
Dept: HOME HEALTH SERVICES | Facility: HOSPITAL | Age: 87
End: 2022-03-21
Payer: MEDICARE

## 2022-04-12 ENCOUNTER — DOCUMENT SCAN (OUTPATIENT)
Dept: HOME HEALTH SERVICES | Facility: HOSPITAL | Age: 87
End: 2022-04-12
Payer: MEDICARE

## 2022-04-12 DIAGNOSIS — B37.0 THRUSH: ICD-10-CM

## 2022-04-13 RX ORDER — NYSTATIN 100000 [USP'U]/ML
SUSPENSION ORAL
Qty: 160 ML | Refills: 0 | Status: SHIPPED | OUTPATIENT
Start: 2022-04-13 | End: 2022-06-02

## 2022-04-26 ENCOUNTER — PATIENT OUTREACH (OUTPATIENT)
Dept: ADMINISTRATIVE | Facility: OTHER | Age: 87
End: 2022-04-26
Payer: MEDICARE

## 2022-04-26 NOTE — PROGRESS NOTES
Health Maintenance Due   Topic Date Due    TETANUS VACCINE  Never done    Shingles Vaccine (1 of 2) Never done    Pneumococcal Vaccines (Age 65+) (1 - PCV) Never done    COVID-19 Vaccine (3 - Booster for Pfizer series) 07/17/2021    Influenza Vaccine (1) 09/01/2021     Updates were requested from care everywhere.  Chart was reviewed for overdue Proactive [facility] Encounters (LYNSEY) topics (CRS, Breast Cancer Screening, Eye exam)  Health Maintenance has been updated.  LINKS immunization registry triggered.  Immunizations were reconciled.

## 2022-04-27 ENCOUNTER — OFFICE VISIT (OUTPATIENT)
Dept: UROLOGY | Facility: CLINIC | Age: 87
End: 2022-04-27
Payer: MEDICARE

## 2022-04-27 DIAGNOSIS — R30.0 DYSURIA: ICD-10-CM

## 2022-04-27 DIAGNOSIS — R31.29 MICROHEMATURIA: Primary | ICD-10-CM

## 2022-04-27 LAB
BILIRUB SERPL-MCNC: ABNORMAL MG/DL
BLOOD URINE, POC: ABNORMAL
CLARITY, POC UA: CLEAR
COLOR, POC UA: YELLOW
GLUCOSE UR QL STRIP: ABNORMAL
KETONES UR QL STRIP: ABNORMAL
LEUKOCYTE ESTERASE URINE, POC: ABNORMAL
NITRITE, POC UA: ABNORMAL
PH, POC UA: 6
PROTEIN, POC: ABNORMAL
SPECIFIC GRAVITY, POC UA: 1.02
UROBILINOGEN, POC UA: 0.2

## 2022-04-27 PROCEDURE — 99203 OFFICE O/P NEW LOW 30 MIN: CPT | Mod: S$GLB,,, | Performed by: UROLOGY

## 2022-04-27 PROCEDURE — 81002 POCT URINE DIPSTICK WITHOUT MICROSCOPE: ICD-10-PCS | Mod: S$GLB,,, | Performed by: UROLOGY

## 2022-04-27 PROCEDURE — 99203 PR OFFICE/OUTPT VISIT, NEW, LEVL III, 30-44 MIN: ICD-10-PCS | Mod: S$GLB,,, | Performed by: UROLOGY

## 2022-04-27 PROCEDURE — 1159F MED LIST DOCD IN RCRD: CPT | Mod: CPTII,S$GLB,, | Performed by: UROLOGY

## 2022-04-27 PROCEDURE — 1100F PR PT FALLS ASSESS DOC 2+ FALLS/FALL W/INJURY/YR: ICD-10-PCS | Mod: CPTII,S$GLB,, | Performed by: UROLOGY

## 2022-04-27 PROCEDURE — 3288F PR FALLS RISK ASSESSMENT DOCUMENTED: ICD-10-PCS | Mod: CPTII,S$GLB,, | Performed by: UROLOGY

## 2022-04-27 PROCEDURE — 81002 URINALYSIS NONAUTO W/O SCOPE: CPT | Mod: S$GLB,,, | Performed by: UROLOGY

## 2022-04-27 PROCEDURE — 1160F PR REVIEW ALL MEDS BY PRESCRIBER/CLIN PHARMACIST DOCUMENTED: ICD-10-PCS | Mod: CPTII,S$GLB,, | Performed by: UROLOGY

## 2022-04-27 PROCEDURE — 99999 PR PBB SHADOW E&M-EST. PATIENT-LVL III: ICD-10-PCS | Mod: PBBFAC,,, | Performed by: UROLOGY

## 2022-04-27 PROCEDURE — 1100F PTFALLS ASSESS-DOCD GE2>/YR: CPT | Mod: CPTII,S$GLB,, | Performed by: UROLOGY

## 2022-04-27 PROCEDURE — 3288F FALL RISK ASSESSMENT DOCD: CPT | Mod: CPTII,S$GLB,, | Performed by: UROLOGY

## 2022-04-27 PROCEDURE — 99999 PR PBB SHADOW E&M-EST. PATIENT-LVL III: CPT | Mod: PBBFAC,,, | Performed by: UROLOGY

## 2022-04-27 PROCEDURE — 1160F RVW MEDS BY RX/DR IN RCRD: CPT | Mod: CPTII,S$GLB,, | Performed by: UROLOGY

## 2022-04-27 PROCEDURE — 1126F PR PAIN SEVERITY QUANTIFIED, NO PAIN PRESENT: ICD-10-PCS | Mod: CPTII,S$GLB,, | Performed by: UROLOGY

## 2022-04-27 PROCEDURE — 1159F PR MEDICATION LIST DOCUMENTED IN MEDICAL RECORD: ICD-10-PCS | Mod: CPTII,S$GLB,, | Performed by: UROLOGY

## 2022-04-27 PROCEDURE — 1126F AMNT PAIN NOTED NONE PRSNT: CPT | Mod: CPTII,S$GLB,, | Performed by: UROLOGY

## 2022-04-27 NOTE — PROGRESS NOTES
Subjective:       Patient ID: Tamie Fink is a 89 y.o. female.    Chief Complaint: Hematuria    HPI     89-year-old with microscopic hematuria on several occasions.  She does complain of occasional dysuria and urgency.  She says she sometimes has a heavy feeling in her bladder.  She denies gross hematuria.  Her urinalysis is reviewed today and there is microscopic blood noted on UA.  She has no history of stones.  She denies flank pain.    Urine dipstick shows positive for 1+ blood and positive for trace leukocytes.  Micro exam: 3-5 RBC's per HPF.    Past Medical History:   Diagnosis Date    Allergy     Arthritis     Asthma     GERD (gastroesophageal reflux disease)     HEARING LOSS     Hypertension     Lung disease     Pneumonia      Past Surgical History:   Procedure Laterality Date    CAROTID STENT         Current Outpatient Medications:     albuterol (PROVENTIL/VENTOLIN HFA) 90 mcg/actuation inhaler, Inhale 2 puffs into the lungs., Disp: , Rfl:     atorvastatin (LIPITOR) 20 MG tablet, Take 1 tablet (20 mg total) by mouth once daily., Disp: 90 tablet, Rfl: 4    benazepriL (LOTENSIN) 10 MG tablet, Take 1 tablet (10 mg total) by mouth once daily., Disp: 90 tablet, Rfl: 4    clopidogreL (PLAVIX) 75 mg tablet, Take 1 tablet (75 mg total) by mouth once daily., Disp: 90 tablet, Rfl: 4    fluticasone-umeclidin-vilanter (TRELEGY ELLIPTA) 100-62.5-25 mcg DsDv, Inhale 1 puff into the lungs., Disp: , Rfl:     ipratropium (ATROVENT HFA) 17 mcg/actuation inhaler, Inhale 2 puffs into the lungs every 6 (six) hours. Rescue, Disp: , Rfl:     linaCLOtide (LINZESS) 72 mcg Cap capsule, Take 1 capsule (72 mcg total) by mouth once daily., Disp: 30 capsule, Rfl: 1    mupirocin (BACTROBAN) 2 % ointment, Apply topically 2 (two) times daily., Disp: 30 g, Rfl: 0    nystatin (MYCOSTATIN) 100,000 unit/mL suspension, swish FOUR MILLILITERS BY MOUTH then spit out. repeat FOUR TIMES DAILY FOR 10 DAYS, Disp: 160 mL, Rfl:  0    omeprazole (PRILOSEC) 20 MG capsule, Take 1 capsule (20 mg total) by mouth every evening., Disp: 90 capsule, Rfl: 4    OXYGEN-AIR DELIVERY SYSTEMS MISC, by Miscellaneous route Use as directed  2 L at home 3 L on ambulation.  MFallerLPN, Disp: , Rfl:     pregabalin (LYRICA) 75 MG capsule, Take 1 capsule (75 mg total) by mouth 2 (two) times daily., Disp: 60 capsule, Rfl: 5    SENNOSIDES (SENOKOT ORAL), Take by mouth as needed., Disp: , Rfl:     traMADoL (ULTRAM) 50 mg tablet, TAKE 1 TABLET BY MOUTH EVERY 6 HOURS AS NEEDED, Disp: 120 tablet, Rfl: 1      Review of Systems   Constitutional: Negative for fever.   Eyes: Negative for visual disturbance.   Respiratory: Positive for shortness of breath.    Cardiovascular: Negative for chest pain.   Gastrointestinal: Negative for nausea.   Genitourinary: Positive for dysuria. Negative for flank pain and hematuria.   Musculoskeletal: Positive for back pain.   Skin: Negative for rash.   Neurological: Negative for seizures.   Psychiatric/Behavioral: Negative for confusion.       Objective:      Physical Exam  Vitals reviewed.   Constitutional:       Appearance: She is well-developed.   HENT:      Head: Normocephalic.   Eyes:      Conjunctiva/sclera: Conjunctivae normal.   Cardiovascular:      Rate and Rhythm: Normal rate.   Pulmonary:      Effort: Pulmonary effort is normal.      Comments: Supplemental oxygen  Abdominal:      Palpations: Abdomen is soft.   Genitourinary:     Comments: Bladder non-tender and nondistended  No CVA tenderness  Musculoskeletal:      Cervical back: Normal range of motion.      Right lower leg: No edema.      Left lower leg: No edema.   Skin:     General: Skin is warm and dry.      Findings: No erythema or rash.   Neurological:      Mental Status: She is alert and oriented to person, place, and time.   Psychiatric:         Behavior: Behavior normal.         Assessment:       1. Microhematuria    2. Dysuria        Plan:       Microhematuria  -      Cystoscopy; Future  -     US Retroperitoneal Complete (Kidney and; Future; Expected date: 04/27/2022    Dysuria  -     POCT URINE DIPSTICK WITHOUT MICROSCOPE  -     Ambulatory referral/consult to Urology

## 2022-05-15 ENCOUNTER — NURSE TRIAGE (OUTPATIENT)
Dept: ADMINISTRATIVE | Facility: CLINIC | Age: 87
End: 2022-05-15
Payer: MEDICARE

## 2022-05-15 NOTE — TELEPHONE ENCOUNTER
Spoke with Ousmane states patient needs tramadol refilled.  Informed Ousmane that medication refill request would be sent to office. He states his mother has been sleeping more today.  Spoke with patient directly states she is more sleepy today after taking tylenol but states that is not unusual.  States she feels okay and does not feel the need to be triaged.  She states her son is overly concerned but there is no need to proceed with any questions.  Patient states she does not need message sent to office about tramadol because her prescription is waiting at the pharmacy.  Patient states she didn't have a way to get to the pharmacy but will get there tomorrow.  Advised patient to call back if further assistance is needed.  Patient verbalized understanding.     Reason for Disposition   Caller has cancelled the call before the first contact    Additional Information   Commented on: Unconscious (can't be awakened)     Patient declined being triaged.    Protocols used: NO CONTACT OR DUPLICATE CONTACT CALL-A-AH, SLEEP [id number]

## 2022-05-31 ENCOUNTER — PATIENT MESSAGE (OUTPATIENT)
Dept: FAMILY MEDICINE | Facility: CLINIC | Age: 87
End: 2022-05-31
Payer: MEDICARE

## 2022-06-02 DIAGNOSIS — B37.0 THRUSH: ICD-10-CM

## 2022-06-02 RX ORDER — NYSTATIN 100000 [USP'U]/ML
SUSPENSION ORAL
Qty: 160 ML | Refills: 0 | Status: SHIPPED | OUTPATIENT
Start: 2022-06-02 | End: 2022-09-06 | Stop reason: SDUPTHER

## 2022-06-09 ENCOUNTER — TELEPHONE (OUTPATIENT)
Dept: ADMINISTRATIVE | Facility: HOSPITAL | Age: 87
End: 2022-06-09
Payer: MEDICARE

## 2022-07-11 ENCOUNTER — OFFICE VISIT (OUTPATIENT)
Dept: HOME HEALTH SERVICES | Facility: CLINIC | Age: 87
End: 2022-07-11
Payer: MEDICARE

## 2022-07-11 DIAGNOSIS — R26.81 UNSTEADY GAIT: Chronic | ICD-10-CM

## 2022-07-11 DIAGNOSIS — K21.9 HIATAL HERNIA WITH GERD: ICD-10-CM

## 2022-07-11 DIAGNOSIS — E27.8 OTHER SPECIFIED DISORDERS OF ADRENAL GLAND: Chronic | ICD-10-CM

## 2022-07-11 DIAGNOSIS — J44.9 CHRONIC OBSTRUCTIVE PULMONARY DISEASE, UNSPECIFIED COPD TYPE: Chronic | ICD-10-CM

## 2022-07-11 DIAGNOSIS — R63.0 DECREASED APPETITE: Chronic | ICD-10-CM

## 2022-07-11 DIAGNOSIS — R29.6 FALLS FREQUENTLY: ICD-10-CM

## 2022-07-11 DIAGNOSIS — J47.9 BRONCHIECTASIS WITHOUT COMPLICATION: ICD-10-CM

## 2022-07-11 DIAGNOSIS — A31.0 PULMONARY MYCOBACTERIAL INFECTION: ICD-10-CM

## 2022-07-11 DIAGNOSIS — K44.9 HIATAL HERNIA WITH GERD: ICD-10-CM

## 2022-07-11 DIAGNOSIS — I70.0 AORTIC ARCH ATHEROSCLEROSIS: ICD-10-CM

## 2022-07-11 DIAGNOSIS — Z00.00 ENCOUNTER FOR PREVENTIVE HEALTH EXAMINATION: Primary | ICD-10-CM

## 2022-07-11 DIAGNOSIS — E43 SEVERE PROTEIN-CALORIE MALNUTRITION: Chronic | ICD-10-CM

## 2022-07-11 DIAGNOSIS — I10 ESSENTIAL HYPERTENSION: ICD-10-CM

## 2022-07-11 DIAGNOSIS — G89.29 CHRONIC HIP PAIN, UNSPECIFIED LATERALITY: ICD-10-CM

## 2022-07-11 DIAGNOSIS — D69.2 OTHER NONTHROMBOCYTOPENIC PURPURA: Chronic | ICD-10-CM

## 2022-07-11 DIAGNOSIS — M15.9 PRIMARY OSTEOARTHRITIS INVOLVING MULTIPLE JOINTS: ICD-10-CM

## 2022-07-11 DIAGNOSIS — I42.1 HOCM (HYPERTROPHIC OBSTRUCTIVE CARDIOMYOPATHY): Chronic | ICD-10-CM

## 2022-07-11 DIAGNOSIS — M25.559 CHRONIC HIP PAIN, UNSPECIFIED LATERALITY: ICD-10-CM

## 2022-07-11 DIAGNOSIS — Z99.81 CHRONIC RESPIRATORY FAILURE WITH HYPOXIA, ON HOME OXYGEN THERAPY: Chronic | ICD-10-CM

## 2022-07-11 DIAGNOSIS — J96.11 CHRONIC RESPIRATORY FAILURE WITH HYPOXIA, ON HOME OXYGEN THERAPY: Chronic | ICD-10-CM

## 2022-07-11 DIAGNOSIS — N18.31 STAGE 3A CHRONIC KIDNEY DISEASE: Chronic | ICD-10-CM

## 2022-07-11 DIAGNOSIS — E78.5 HYPERLIPIDEMIA, UNSPECIFIED HYPERLIPIDEMIA TYPE: ICD-10-CM

## 2022-07-11 PROCEDURE — 1159F PR MEDICATION LIST DOCUMENTED IN MEDICAL RECORD: ICD-10-PCS | Mod: CPTII,S$GLB,, | Performed by: NURSE PRACTITIONER

## 2022-07-11 PROCEDURE — 1160F RVW MEDS BY RX/DR IN RCRD: CPT | Mod: CPTII,S$GLB,, | Performed by: NURSE PRACTITIONER

## 2022-07-11 PROCEDURE — 1126F AMNT PAIN NOTED NONE PRSNT: CPT | Mod: CPTII,S$GLB,, | Performed by: NURSE PRACTITIONER

## 2022-07-11 PROCEDURE — 1159F MED LIST DOCD IN RCRD: CPT | Mod: CPTII,S$GLB,, | Performed by: NURSE PRACTITIONER

## 2022-07-11 PROCEDURE — 1126F PR PAIN SEVERITY QUANTIFIED, NO PAIN PRESENT: ICD-10-PCS | Mod: CPTII,S$GLB,, | Performed by: NURSE PRACTITIONER

## 2022-07-11 PROCEDURE — G0439 PR MEDICARE ANNUAL WELLNESS SUBSEQUENT VISIT: ICD-10-PCS | Mod: S$GLB,,, | Performed by: NURSE PRACTITIONER

## 2022-07-11 PROCEDURE — 3288F FALL RISK ASSESSMENT DOCD: CPT | Mod: CPTII,S$GLB,, | Performed by: NURSE PRACTITIONER

## 2022-07-11 PROCEDURE — 99499 RISK ADDL DX/OHS AUDIT: ICD-10-PCS | Mod: S$GLB,,, | Performed by: NURSE PRACTITIONER

## 2022-07-11 PROCEDURE — G0439 PPPS, SUBSEQ VISIT: HCPCS | Mod: S$GLB,,, | Performed by: NURSE PRACTITIONER

## 2022-07-11 PROCEDURE — 1100F PR PT FALLS ASSESS DOC 2+ FALLS/FALL W/INJURY/YR: ICD-10-PCS | Mod: CPTII,S$GLB,, | Performed by: NURSE PRACTITIONER

## 2022-07-11 PROCEDURE — 99499 UNLISTED E&M SERVICE: CPT | Mod: S$GLB,,, | Performed by: NURSE PRACTITIONER

## 2022-07-11 PROCEDURE — 1100F PTFALLS ASSESS-DOCD GE2>/YR: CPT | Mod: CPTII,S$GLB,, | Performed by: NURSE PRACTITIONER

## 2022-07-11 PROCEDURE — 3288F PR FALLS RISK ASSESSMENT DOCUMENTED: ICD-10-PCS | Mod: CPTII,S$GLB,, | Performed by: NURSE PRACTITIONER

## 2022-07-11 PROCEDURE — 1160F PR REVIEW ALL MEDS BY PRESCRIBER/CLIN PHARMACIST DOCUMENTED: ICD-10-PCS | Mod: CPTII,S$GLB,, | Performed by: NURSE PRACTITIONER

## 2022-07-13 VITALS
TEMPERATURE: 98 F | DIASTOLIC BLOOD PRESSURE: 61 MMHG | OXYGEN SATURATION: 96 % | SYSTOLIC BLOOD PRESSURE: 121 MMHG | WEIGHT: 85 LBS | HEART RATE: 100 BPM | BODY MASS INDEX: 14.59 KG/M2

## 2022-07-13 PROBLEM — A31.0 PULMONARY MYCOBACTERIAL INFECTION: Status: ACTIVE | Noted: 2022-04-11

## 2022-07-13 PROBLEM — R29.6 FALLS FREQUENTLY: Status: ACTIVE | Noted: 2022-01-14

## 2022-07-13 PROBLEM — J47.9 BRONCHIECTASIS WITHOUT COMPLICATION: Status: ACTIVE | Noted: 2022-04-11

## 2022-07-13 PROBLEM — J47.9 BRONCHIECTASIS WITHOUT COMPLICATION: Status: ACTIVE | Noted: 2019-11-12

## 2022-07-14 NOTE — PROGRESS NOTES
Tamie Fink presented for a  Medicare AWV and comprehensive Health Risk Assessment today. The following components were reviewed and updated:    · Medical history  · Family History  · Social history  · Allergies and Current Medications  · Health Risk Assessment  · Health Maintenance  · Care Team         ** See Completed Assessments for Annual Wellness Visit within the encounter summary.**         The following assessments were completed:  · Living Situation  · CAGE  · Depression Screening  · Timed Get Up and Go  · Whisper Test  · Cognitive Function Screening - pt declined  · Nutrition Screening  · ADL Screening  · PAQ Screening        Vitals:    07/11/22 1124   BP: 121/61   Pulse: 100   Temp: 97.7 °F (36.5 °C)   SpO2: 96%   Weight: 38.6 kg (85 lb)     Body mass index is 14.59 kg/m².  Physical Exam  Vitals reviewed.   HENT:      Head: Normocephalic.   Eyes:      Pupils: Pupils are equal, round, and reactive to light.   Cardiovascular:      Rate and Rhythm: Normal rate and regular rhythm.      Heart sounds: Normal heart sounds.   Pulmonary:      Effort: Pulmonary effort is normal.      Breath sounds: Normal breath sounds.      Comments: O2 2L continuous  Abdominal:      General: Bowel sounds are normal.      Palpations: Abdomen is soft.   Musculoskeletal:         General: Normal range of motion.      Cervical back: Normal range of motion.   Skin:     General: Skin is warm and dry.   Neurological:      Mental Status: She is alert and oriented to person, place, and time.   Psychiatric:         Behavior: Behavior normal.               Diagnoses and health risks identified today and associated recommendations/orders:    1. Encounter for preventive health examination  - Above assessments completed. Preventive measures and health maintenance reviewed with patient.  -discussed overdue vaccines    2. Chronic obstructive pulmonary disease, unspecified COPD type  3. Bronchiectasis without complication  4. Pulmonary  mycobacterial infection  5. Chronic respiratory failure with hypoxia, on home oxygen therapy  Stable and chronic, followed by [facility]ologist  -on inhalers, nebs and O2 2.5L continous    6. HOCM (hypertrophic obstructive cardiomyopathy)  7. Aortic arch atherosclerosis - Noted on CXR 11/7/2011  Stable and chronic, followed by Cardiology  -on statin, clopidogrel and benaepril    8. Stage 3a chronic kidney disease  Stable and chronic, followed by PCP  -encouraged hydration and avoidance of NSAIDs    9. Other specified disorders of adrenal gland  Stable and chronic    10. Other nonthrombocytopenic purpura  Stable, followed by PCP  -bruising noted, no bleeding issues reported  -on clopidogrel    11. Severe protein-calorie malnutrition  12. Decreased appetite  -Patient reports appetite has decreased since Covid 19 dx and O2 use.  She states she can only eat small amounts before she gets full  -encouraged frequent small meals throughout the day and boost/ensure to supplement meals    13. Essential hypertension  Stable, followed by PCP  -on benazepril    14. Hyperlipidemia, unspecified hyperlipidemia type  Stable, followed by PCP  -on statin    15. Hiatal hernia with GERD  Stable, followed by PCP  -on omeprazole    16. Primary osteoarthritis involving multiple joints  17. Chronic hip pain, unspecified laterality  Chronic, followed by PCP  -tramadol prn    18. Unsteady gait  19. Falls frequently  -uses cane, but admits she does forget when ambulating throughout home.    -discussed safety and fall precautions including use of cane at all times  -no injuries sustained d/t falls      Provided [place] with a 5-10 year written screening schedule and personal prevention plan. Recommendations were developed using the USPSTF age appropriate recommendations. Education, counseling, and referrals were provided as needed. After Visit Summary printed and given to patient which includes a list of additional screenings\tests  needed.    Follow up in about 1 year (around 7/11/2023) for your next annual wellness visit.    Virginie Barajas, LORI    I offered to discuss advanced care planning, including how to pick a person who would make decisions for you if you were unable to make them for yourself, called a health care power of , and what kind of decisions you might make such as use of life sustaining treatments such as ventilators and tube feeding when faced with a life limiting illness recorded on a living will that they will need to know. (How you want to be cared for as you near the end of your natural life)     X Patient is interested in learning more about how to make advanced directives.  I provided them paperwork and offered to discuss this with them.

## 2022-07-14 NOTE — PATIENT INSTRUCTIONS
Counseling and Referral of Other Preventative  (Italic type indicates deductible and co-insurance are waived)    Patient Name: Tamie Fink  Today's Date: 7/13/2022    Health Maintenance       Date Due Completion Date    Pneumococcal Vaccines (Age 65+) (1 - PCV) Never done ---    TETANUS VACCINE Never done ---    Shingles Vaccine (1 of 2) Never done ---    COVID-19 Vaccine (3 - Booster for Pfizer series) 07/17/2021 2/17/2021    Influenza Vaccine (1) 09/01/2022 11/17/2015    Lipid Panel 01/14/2023 1/14/2022    Override on 10/16/2019: Done        No orders of the defined types were placed in this encounter.    The following information is provided to all patients.  This information is to help you find resources for any of the problems found today that may be affecting your health:                Living healthy guide: www.[facility].[place].gov      Understanding Diabetes: www.diabetes.org      Eating healthy: www.cdc.gov/healthyweight      [facility] home safety checklist: www.cdc.gov/steadi/patient.html      Agency on Aging: www.goea.[place].[facility]      Alcoholics anonymous (AA): www.aa.org      Physical Activity: www.joseph.nih.gov/eu0gmvl      Tobacco use: www.quitwithusla.org

## 2022-08-11 ENCOUNTER — TELEPHONE (OUTPATIENT)
Dept: FAMILY MEDICINE | Facility: CLINIC | Age: 87
End: 2022-08-11
Payer: MEDICARE

## 2022-08-11 NOTE — TELEPHONE ENCOUNTER
Spoke to patient as Ousmane grier, she felt bad earlier and he was worried but she feels better now and does not need anything, advised to call back if she needs anything.

## 2022-08-11 NOTE — TELEPHONE ENCOUNTER
----- Message from Brigida Hdez sent at 8/11/2022 11:24 AM CDT -----  Regarding: Call back  Contact: Patient's sonRamón Romeo  Please have Shruthi to call patient's son at Ph  [phone]

## 2022-08-12 ENCOUNTER — OFFICE VISIT (OUTPATIENT)
Dept: FAMILY MEDICINE | Facility: CLINIC | Age: 87
End: 2022-08-12
Payer: MEDICARE

## 2022-08-12 ENCOUNTER — LAB VISIT (OUTPATIENT)
Dept: LAB | Facility: HOSPITAL | Age: 87
End: 2022-08-12
Attending: FAMILY MEDICINE
Payer: MEDICARE

## 2022-08-12 VITALS
TEMPERATURE: 98 F | SYSTOLIC BLOOD PRESSURE: 118 MMHG | BODY MASS INDEX: 14.6 KG/M2 | WEIGHT: 85.5 LBS | HEIGHT: 64 IN | HEART RATE: 100 BPM | OXYGEN SATURATION: 90 % | RESPIRATION RATE: 20 BRPM | DIASTOLIC BLOOD PRESSURE: 68 MMHG

## 2022-08-12 DIAGNOSIS — D50.9 IRON DEFICIENCY ANEMIA, UNSPECIFIED IRON DEFICIENCY ANEMIA TYPE: ICD-10-CM

## 2022-08-12 DIAGNOSIS — N18.31 STAGE 3A CHRONIC KIDNEY DISEASE: Chronic | ICD-10-CM

## 2022-08-12 DIAGNOSIS — J44.9 CHRONIC OBSTRUCTIVE PULMONARY DISEASE, UNSPECIFIED COPD TYPE: Chronic | ICD-10-CM

## 2022-08-12 DIAGNOSIS — Z79.899 ENCOUNTER FOR LONG-TERM (CURRENT) USE OF MEDICATIONS: ICD-10-CM

## 2022-08-12 DIAGNOSIS — R20.2 NUMBNESS AND TINGLING OF BOTH FEET: ICD-10-CM

## 2022-08-12 DIAGNOSIS — R31.21 ASYMPTOMATIC MICROSCOPIC HEMATURIA: ICD-10-CM

## 2022-08-12 DIAGNOSIS — R20.0 NUMBNESS AND TINGLING OF BOTH FEET: ICD-10-CM

## 2022-08-12 DIAGNOSIS — D50.9 IRON DEFICIENCY ANEMIA, UNSPECIFIED IRON DEFICIENCY ANEMIA TYPE: Primary | ICD-10-CM

## 2022-08-12 DIAGNOSIS — Z00.00 WELL ADULT EXAM: ICD-10-CM

## 2022-08-12 LAB
BACTERIA #/AREA URNS HPF: ABNORMAL /HPF
BILIRUB UR QL STRIP: NEGATIVE
CLARITY UR: CLEAR
COLOR UR: YELLOW
GLUCOSE UR QL STRIP: NEGATIVE
HGB UR QL STRIP: ABNORMAL
KETONES UR QL STRIP: NEGATIVE
LEUKOCYTE ESTERASE UR QL STRIP: ABNORMAL
MICROSCOPIC COMMENT: ABNORMAL
NITRITE UR QL STRIP: NEGATIVE
NON-SQ EPI CELLS #/AREA URNS HPF: 1 /HPF
PH UR STRIP: 7.5 [PH] (ref 5–8)
PROT UR QL STRIP: ABNORMAL
RBC #/AREA URNS HPF: 5 /HPF (ref 0–4)
SP GR UR STRIP: 1.01 (ref 1–1.03)
SQUAMOUS #/AREA URNS HPF: 4 /HPF
URN SPEC COLLECT METH UR: ABNORMAL
WBC #/AREA URNS HPF: 12 /HPF (ref 0–5)

## 2022-08-12 PROCEDURE — 99214 OFFICE O/P EST MOD 30 MIN: CPT | Mod: S$GLB,,, | Performed by: NURSE PRACTITIONER

## 2022-08-12 PROCEDURE — 81000 URINALYSIS NONAUTO W/SCOPE: CPT | Mod: PO | Performed by: NURSE PRACTITIONER

## 2022-08-12 PROCEDURE — 99999 PR PBB SHADOW E&M-EST. PATIENT-LVL V: CPT | Mod: PBBFAC,,, | Performed by: NURSE PRACTITIONER

## 2022-08-12 PROCEDURE — 1101F PT FALLS ASSESS-DOCD LE1/YR: CPT | Mod: CPTII,S$GLB,, | Performed by: NURSE PRACTITIONER

## 2022-08-12 PROCEDURE — 85027 COMPLETE CBC AUTOMATED: CPT | Performed by: NURSE PRACTITIONER

## 2022-08-12 PROCEDURE — 99499 RISK ADDL DX/OHS AUDIT: ICD-10-PCS | Mod: S$GLB,,, | Performed by: NURSE PRACTITIONER

## 2022-08-12 PROCEDURE — 99499 UNLISTED E&M SERVICE: CPT | Mod: S$GLB,,, | Performed by: NURSE PRACTITIONER

## 2022-08-12 PROCEDURE — 87086 URINE CULTURE/COLONY COUNT: CPT | Performed by: NURSE PRACTITIONER

## 2022-08-12 PROCEDURE — 99214 PR OFFICE/OUTPT VISIT, EST, LEVL IV, 30-39 MIN: ICD-10-PCS | Mod: S$GLB,,, | Performed by: NURSE PRACTITIONER

## 2022-08-12 PROCEDURE — 3288F PR FALLS RISK ASSESSMENT DOCUMENTED: ICD-10-PCS | Mod: CPTII,S$GLB,, | Performed by: NURSE PRACTITIONER

## 2022-08-12 PROCEDURE — 3288F FALL RISK ASSESSMENT DOCD: CPT | Mod: CPTII,S$GLB,, | Performed by: NURSE PRACTITIONER

## 2022-08-12 PROCEDURE — 99999 PR PBB SHADOW E&M-EST. PATIENT-LVL V: ICD-10-PCS | Mod: PBBFAC,,, | Performed by: NURSE PRACTITIONER

## 2022-08-12 PROCEDURE — 1101F PR PT FALLS ASSESS DOC 0-1 FALLS W/OUT INJ PAST YR: ICD-10-PCS | Mod: CPTII,S$GLB,, | Performed by: NURSE PRACTITIONER

## 2022-08-12 PROCEDURE — 36415 COLL VENOUS BLD VENIPUNCTURE: CPT | Mod: PO | Performed by: FAMILY MEDICINE

## 2022-08-12 PROCEDURE — 80053 COMPREHEN METABOLIC PANEL: CPT | Performed by: FAMILY MEDICINE

## 2022-08-12 NOTE — PROGRESS NOTES
Assessment/Plan:  Problem List Items Addressed This Visit        Pulmonary    Chronic obstructive pulmonary disease (Chronic)    Overview     Chronic.  Followed by Pulmonary.  Patient is using inhalers and nebulizer as prescribed.  Patient using 2 L of oxygen continuously.  Reports dyspnea on exertion is controlled.  Baseline pulse ox is about 93%           Current Assessment & Plan     Chronic, stable. Chronic GERARDO.   Using inhalers as prescribed  Currently using 3L supplemental oxygen; she reports monitoring oxygen levels at home  Following with pulmonology               Renal/    Stage 3a chronic kidney disease (Chronic)    Overview     Chronic.  GFR stable.    BMP  Lab Results   Component Value Date     (L) 01/14/2022    K 4.7 01/14/2022    CL 90 (L) 01/14/2022    CO2 32 (H) 01/14/2022    BUN 17 01/14/2022    CREATININE 0.7 01/14/2022    CALCIUM 9.1 01/14/2022    ANIONGAP 9 01/14/2022    ESTGFRAFRICA >60.0 01/14/2022    EGFRNONAA >60.0 01/14/2022                Current Assessment & Plan     Chronic, stable  Continue to monitor renal function  Avoid NSAIDs  Following with nephrology     BMP  Lab Results   Component Value Date     08/12/2022    K 4.7 08/12/2022    CL 87 (L) 08/12/2022    CO2 38 (H) 08/12/2022    BUN 17 08/12/2022    CREATININE 0.6 08/12/2022    CALCIUM 9.3 08/12/2022    ANIONGAP 11 08/12/2022    ESTGFRAFRICA >60.0 02/03/2022    EGFRNONAA >60.0 02/03/2022              Asymptomatic microscopic hematuria    Overview     Chronic problem  Following with urology, last appt 4/2022  Retroperitoneal US ordered which she has not completed, advised to schedule  Cystoscope was also previously ordered; patient expresses that she does not want to have this done  Recommend repeat U/A today  Advised to closely follow up with urology            Relevant Orders    Urinalysis, Reflex to Urine Culture Urine, Clean Catch (Completed)       Oncology    Anemia - Primary    Overview     Chronic problem,  progressively worsening. Symptomatic with complaints of dizziness, weakness, fatigue. No active signs of GI bleeding. She does report hematuria. She is following with urology.   She was previously started on iron. She is not currently taking supplement due to SE of nausea  She has been referred to hematology, recommend follow up with specialist.   Repeat labs and U/A today     Latest Reference Range & Units 02/03/22 13:36   WBC 3.90 - 12.70 K/uL 8.75   RBC 4.00 - 5.40 M/uL 3.44 (L)   Hemoglobin 12.0 - 16.0 g/dL 9.9 (L)   Hematocrit 37.0 - 48.5 % 33.5 (L)   MCV 82 - 98 fL 97   MCH 27.0 - 31.0 pg 28.8   MCHC 32.0 - 36.0 g/dL 29.6 (L)   RDW 11.5 - 14.5 % 15.8 (H)   Platelets 150 - 450 K/uL 363   MPV 9.2 - 12.9 fL 11.8   Gran % 38.0 - 73.0 % 72.2   Lymph % 18.0 - 48.0 % 16.8 (L)   Mono % 4.0 - 15.0 % 8.5   Eosinophil % 0.0 - 8.0 % 1.6   Basophil % 0.0 - 1.9 % 0.6   Immature Granulocytes 0.0 - 0.5 % 0.3   Gran # (ANC) 1.8 - 7.7 K/uL 6.3   Lymph # 1.0 - 4.8 K/uL 1.5   Mono # 0.3 - 1.0 K/uL 0.7   Eos # 0.0 - 0.5 K/uL 0.1   Baso # 0.00 - 0.20 K/uL 0.05   Immature Grans (Abs) 0.00 - 0.04 K/uL 0.03   nRBC 0 /100 WBC 0   Differential Method  Automated   (L): Data is abnormally low  (H): Data is abnormally high     Latest Reference Range & Units 01/14/22 15:17   Iron 30 - 160 ug/dL 15 (L)   TIBC 250 - 450 ug/dL 228 (L)   Saturated Iron 20 - 50 % 7 (L)   Transferrin 200 - 375 mg/dL 154 (L)   Ferritin 20.0 - 300.0 ng/mL 310 (H)   (L): Data is abnormally low  (H): Data is abnormally high           Relevant Orders    CBC Without Differential (Completed)       Other    Numbness and tingling of both feet    Overview     She is also concerned about discoloration to feet bilaterally. This is a chronic problem for several months. Associated with numbness and tingling, however, she reports having a history of neuropathy. She has had no pain to BLE. She has tried nothing for the symptoms. She reports no history of DVT. No recent change in  activity.    Obtain US as ordered  ER precautions for severe or worsening symptoms.            Relevant Orders    US Lower Extrem Arteries Bilat with MELI (xpd) (Completed)        Follow up if symptoms worsen or fail to improve.  ER precautions for severe or worsening symptoms.     Tania Duffy, NP  _____________________________________________________________________________________________________________________________________________________    CC: dizziness     HPI: Patient is a 89-year-old female who presents in clinic today as an established patient here for complaint of dizziness. Associated with weakness and fatigue. This is an ongoing problem. Progressively worsening over the last few weeks. She has a pertinent history of anemia and reports no longer taking iron supplement due to SE of nausea. She denies black/bloody stool. Denies frequent use of NSAIDs. She is currently on Plavix. She does report history of hematuria. She is established with urology, Dr. Rucker. She has had no gross hematuria. She also has pertinent history of CKD, following with nephrology. Chronic conditions have been reviewed. Further details as stated above. She reports living alone. She states that she used to have home health but no longer has HH due to insurance purposes. Patient advised of fall precautions. ER precautions for severe or worsening symptoms.     She is also concerned about discoloration to feet bilaterally. This is a chronic problem for several months. Associated with numbness and tingling, however, she reports having a history of neuropathy. She has had no pain to BLE. She has tried nothing for the symptoms. She reports no history of DVT. No recent change in activity.     Past Medical History:  Past Medical History:   Diagnosis Date    Allergy     Arthritis     Asthma     GERD (gastroesophageal reflux disease)     HEARING LOSS     Hypertension     Lung disease     Pneumonia      Past Surgical History:    Procedure Laterality Date    CAROTID STENT       Review of patient's allergies indicates:   Allergen Reactions    Montelukast Other (See Comments)     Weakness and disorientation      Social History     Tobacco Use    Smoking status: Former Smoker     Packs/day: 1.00     Years: 40.00     Pack years: 40.00     Quit date: 5/3/1984     Years since quittin.3    Smokeless tobacco: Never Used   Substance Use Topics    Alcohol use: No    Drug use: No     Family History   Problem Relation Age of Onset    Cancer Mother     Heart disease Father     Breast cancer Sister     Breast cancer Maternal Aunt     Breast cancer Maternal Grandmother     Breast cancer Cousin     Breast cancer Maternal Aunt      Current Outpatient Medications on File Prior to Visit   Medication Sig Dispense Refill    albuterol (PROVENTIL/VENTOLIN HFA) 90 mcg/actuation inhaler Inhale 2 puffs into the lungs.      atorvastatin (LIPITOR) 20 MG tablet Take 1 tablet (20 mg total) by mouth once daily. 90 tablet 4    benazepriL (LOTENSIN) 10 MG tablet Take 1 tablet (10 mg total) by mouth once daily. 90 tablet 4    fluticasone-umeclidin-vilanter (TRELEGY ELLIPTA) 100-62.5-25 mcg DsDv Inhale 1 puff into the lungs.      ipratropium (ATROVENT HFA) 17 mcg/actuation inhaler Inhale 2 puffs into the lungs every 6 (six) hours. Rescue      linaCLOtide (LINZESS) 72 mcg Cap capsule Take 1 capsule (72 mcg total) by mouth once daily. 30 capsule 1    mupirocin (BACTROBAN) 2 % ointment Apply topically 2 (two) times daily. 30 g 0    nystatin (MYCOSTATIN) 100,000 unit/mL suspension swish FOUR MILLILITERS BY MOUTH then spit out. repeat FOUR TIMES DAILY FOR 10 DAYS 160 mL 0    omeprazole (PRILOSEC) 20 MG capsule Take 1 capsule (20 mg total) by mouth every evening. 90 capsule 4    OXYGEN-AIR DELIVERY SYSTEMS MISC by Miscellaneous route Use as directed  2 L at home 3 L on ambulation.  MFallerLPN      SENNOSIDES (SENOKOT ORAL) Take by mouth as needed.    "    No current facility-administered medications on file prior to visit.     Review of Systems   Constitutional: Positive for fatigue. Negative for chills, fever and unexpected weight change.   HENT: Negative for ear pain and sore throat.    Eyes: Negative for redness and visual disturbance.   Respiratory: Positive for shortness of breath and wheezing. Negative for cough.    Cardiovascular: Negative for chest pain and palpitations.   Gastrointestinal: Negative for blood in stool, nausea and vomiting.   Genitourinary: Positive for hematuria. Negative for difficulty urinating.   Musculoskeletal: Positive for arthralgias, back pain and gait problem. Negative for myalgias.   Skin: Positive for color change. Negative for rash and wound.   Neurological: Positive for dizziness, weakness and numbness. Negative for headaches.   Hematological: Bruises/bleeds easily.   Psychiatric/Behavioral: Positive for sleep disturbance. The patient is not nervous/anxious.      Vitals:    08/12/22 1301   BP: 118/68   Pulse: 100   Resp: 20   Temp: 97.9 °F (36.6 °C)   TempSrc: Temporal   SpO2: (!) 90%   Weight: 38.8 kg (85 lb 8 oz)   Height: 5' 4" (1.626 m)   PF: (!) 3 L/min     Wt Readings from Last 3 Encounters:   08/23/22 38.7 kg (85 lb 5.1 oz)   08/12/22 38.8 kg (85 lb 8 oz)   07/11/22 38.6 kg (85 lb)     Physical Exam  Vitals and nursing note reviewed.   Constitutional:       General: She is not in acute distress.     Appearance: She is well-developed and underweight. She is ill-appearing (Chronically). She is not toxic-appearing or diaphoretic.      Comments: Thin, 85 lbs   HENT:      Head: Normocephalic and atraumatic.      Right Ear: Hearing and external ear normal.      Left Ear: Hearing and external ear normal.   Eyes:      General: Lids are normal.      Conjunctiva/sclera: Conjunctivae normal.      Pupils: Pupils are equal, round, and reactive to light.   Cardiovascular:      Rate and Rhythm: Normal rate and regular rhythm.      " Pulses:           Dorsalis pedis pulses are 1+ on the right side and 1+ on the left side.      Heart sounds: Normal heart sounds. No murmur heard.  Pulmonary:      Effort: Pulmonary effort is normal. No respiratory distress.      Breath sounds: No wheezing.      Comments: On 3L NC; pulse ox 90%  Chest:      Chest wall: No tenderness.   Abdominal:      General: Bowel sounds are normal.      Palpations: Abdomen is soft.   Musculoskeletal:         General: Tenderness and deformity present. Normal range of motion.      Cervical back: Normal range of motion and neck supple.      Right foot: Normal range of motion.      Left foot: Normal range of motion.   Feet:      Right foot:      Skin integrity: No erythema or warmth.      Left foot:      Skin integrity: No erythema or warmth.      Comments: Discoloration to feet bilaterally- see photo below  Skin:     General: Skin is warm and dry.      Capillary Refill: Capillary refill takes less than 2 seconds.      Coloration: Skin is not pale.      Findings: Bruising present.   Neurological:      Mental Status: She is alert and oriented to person, place, and time. She is not disoriented.      GCS: GCS eye subscore is 4. GCS verbal subscore is 5. GCS motor subscore is 6.      Cranial Nerves: No cranial nerve deficit.      Sensory: No sensory deficit.      Motor: Weakness (generalized) and atrophy present. No tremor.      Coordination: Coordination abnormal.      Gait: Gait abnormal (using rolling walker).   Psychiatric:         Attention and Perception: She is attentive.         Mood and Affect: Mood is not anxious or depressed.         Speech: Speech normal. Speech is not rapid and pressured or slurred.         Behavior: Behavior normal. Behavior is not agitated, aggressive or hyperactive. Behavior is cooperative.         Health Maintenance   Topic Date Due    TETANUS VACCINE  Never done    Lipid Panel  01/14/2023    Mammogram  Discontinued

## 2022-08-13 LAB
ALBUMIN SERPL BCP-MCNC: 3.2 G/DL (ref 3.5–5.2)
ALP SERPL-CCNC: 72 U/L (ref 55–135)
ALT SERPL W/O P-5'-P-CCNC: 11 U/L (ref 10–44)
ANION GAP SERPL CALC-SCNC: 11 MMOL/L (ref 8–16)
AST SERPL-CCNC: 17 U/L (ref 10–40)
BILIRUB SERPL-MCNC: 0.2 MG/DL (ref 0.1–1)
BUN SERPL-MCNC: 17 MG/DL (ref 10–30)
CALCIUM SERPL-MCNC: 9.3 MG/DL (ref 8.7–10.5)
CHLORIDE SERPL-SCNC: 87 MMOL/L (ref 95–110)
CO2 SERPL-SCNC: 38 MMOL/L (ref 23–29)
CREAT SERPL-MCNC: 0.6 MG/DL (ref 0.5–1.4)
ERYTHROCYTE [DISTWIDTH] IN BLOOD BY AUTOMATED COUNT: 14.6 % (ref 11.5–14.5)
EST. GFR  (NO RACE VARIABLE): >60 ML/MIN/1.73 M^2
GLUCOSE SERPL-MCNC: 65 MG/DL (ref 70–110)
HCT VFR BLD AUTO: 33.9 % (ref 37–48.5)
HGB BLD-MCNC: 10.3 G/DL (ref 12–16)
MCH RBC QN AUTO: 30.9 PG (ref 27–31)
MCHC RBC AUTO-ENTMCNC: 30.4 G/DL (ref 32–36)
MCV RBC AUTO: 102 FL (ref 82–98)
PLATELET # BLD AUTO: 254 K/UL (ref 150–450)
PMV BLD AUTO: 12.1 FL (ref 9.2–12.9)
POTASSIUM SERPL-SCNC: 4.7 MMOL/L (ref 3.5–5.1)
PROT SERPL-MCNC: 8.8 G/DL (ref 6–8.4)
RBC # BLD AUTO: 3.33 M/UL (ref 4–5.4)
SODIUM SERPL-SCNC: 136 MMOL/L (ref 136–145)
WBC # BLD AUTO: 12.34 K/UL (ref 3.9–12.7)

## 2022-08-14 LAB — BACTERIA UR CULT: NORMAL

## 2022-08-14 NOTE — PROGRESS NOTES
Make follow-up lab appointment per recommendation below.  Check to see if patient has seen the results through my chart.  If not then,  #CALL THE PATIENT# to discuss results/see if they have questions and document verification of contact. Make F/U appt if needed. [phone]    #My interpretation that was sent to them through [employer]:  [place], I have reviewed your recent blood work.     Your complete blood count is abnormal showing anemia.  I recommend following up with a hematologist soon for further evaluation of abnormal labs and elevated total protein level.  See below.  Your metabolic panel is abnormal.  Chloride and CO2 levels are abnormal.  Glucose is low.  Total protein is elevated and albumin is low.  I recommend that you follow-up with a hematologist and nephrologist as soon as possible.    =========================  Also please address any outstanding health maintenance that may be due: Pneumococcal Vaccines (Age 65+)(1 - PCV) Never done  TETANUS VACCINE Never done  Shingles Vaccine(1 of 2) Never done  COVID-19 Vaccine(3 - Booster for Pfizer series) due on 07/17/2021

## 2022-08-15 DIAGNOSIS — N18.31 STAGE 3A CHRONIC KIDNEY DISEASE: Primary | ICD-10-CM

## 2022-08-15 DIAGNOSIS — Z79.899 ENCOUNTER FOR LONG-TERM (CURRENT) USE OF MEDICATIONS: ICD-10-CM

## 2022-08-15 RX ORDER — CLOPIDOGREL BISULFATE 75 MG/1
TABLET ORAL
Qty: 90 TABLET | Refills: 2 | Status: SHIPPED | OUTPATIENT
Start: 2022-08-15

## 2022-08-15 RX ORDER — PREGABALIN 75 MG/1
CAPSULE ORAL
Qty: 60 CAPSULE | Refills: 4 | Status: SHIPPED | OUTPATIENT
Start: 2022-08-15

## 2022-08-15 NOTE — TELEPHONE ENCOUNTER
No new care gaps identified.  [facility] Embedded Care Gaps. Reference number: [id number]. 8/15/2022   10:27:26 AM CORBINT

## 2022-08-15 NOTE — TELEPHONE ENCOUNTER
I have signed for the following orders AND/OR meds.  Please call the patient and ask the patient to schedule the testing AND/OR inform about any medications that were sent.      Orders Placed This Encounter   Procedures    Ambulatory referral/consult to Nephrology     Standing Status:   Future     Standing Expiration Date:   9/15/2023     Referral Priority:   Routine     Referral Type:   Consultation     Referral Reason:   Specialty Services Required     Requested Specialty:   Nephrology     Number of Visits Requested:   1       Medications Ordered This Encounter   Medications    clopidogreL (PLAVIX) 75 mg tablet     Sig: TAKE 1 TABLET EVERY DAY     Dispense:  90 tablet     Refill:  2

## 2022-08-15 NOTE — TELEPHONE ENCOUNTER
Refill Routing Note   Medication(s) are not appropriate for processing by [facility] for the following reason(s):      - Required laboratory values are abnormal    [facility] action(s):  Defer          Medication reconciliation completed: No     Appointments  past 12m or future 3m with PCP    Date Provider   Last Visit   1/14/2022 José Manuel Tyler MD   Next Visit   1/13/2023 José Manuel Tyler MD   ED visits in past 90 days: 0        Note composed:10:48 AM 08/15/2022

## 2022-08-15 NOTE — TELEPHONE ENCOUNTER
----- Message from Kandy Comer sent at 8/15/2022 10:00 AM CDT -----  Contact: [phone]  Patient is calling in regards to test results,Please have a nurse call back at [phone].Thanks

## 2022-08-19 ENCOUNTER — HOSPITAL ENCOUNTER (OUTPATIENT)
Dept: RADIOLOGY | Facility: HOSPITAL | Age: 87
Discharge: HOME | End: 2022-08-19
Attending: UROLOGY
Payer: MEDICARE

## 2022-08-19 ENCOUNTER — HOSPITAL ENCOUNTER (OUTPATIENT)
Dept: RADIOLOGY | Facility: HOSPITAL | Age: 87
Discharge: HOME | End: 2022-08-19
Attending: NURSE PRACTITIONER
Payer: MEDICARE

## 2022-08-19 DIAGNOSIS — R31.29 MICROHEMATURIA: ICD-10-CM

## 2022-08-19 DIAGNOSIS — R20.0 NUMBNESS AND TINGLING OF BOTH FEET: ICD-10-CM

## 2022-08-19 DIAGNOSIS — R20.2 NUMBNESS AND TINGLING OF BOTH FEET: ICD-10-CM

## 2022-08-19 PROCEDURE — 93922 US ARTERIAL LOWER EXTREMITY BILAT WITH ABI (XPD): ICD-10-PCS | Mod: 26,,, | Performed by: RADIOLOGY

## 2022-08-19 PROCEDURE — 93925 LOWER EXTREMITY STUDY: CPT | Mod: TC,PO

## 2022-08-19 PROCEDURE — 93922 UPR/L XTREMITY ART 2 LEVELS: CPT | Mod: 26,,, | Performed by: RADIOLOGY

## 2022-08-19 PROCEDURE — 93925 US ARTERIAL LOWER EXTREMITY BILAT WITH ABI (XPD): ICD-10-PCS | Mod: 26,,, | Performed by: RADIOLOGY

## 2022-08-19 PROCEDURE — 76770 US RETROPERITONEAL COMPLETE: ICD-10-PCS | Mod: 26,,, | Performed by: RADIOLOGY

## 2022-08-19 PROCEDURE — 93925 LOWER EXTREMITY STUDY: CPT | Mod: 26,,, | Performed by: RADIOLOGY

## 2022-08-19 PROCEDURE — 76770 US EXAM ABDO BACK WALL COMP: CPT | Mod: 26,,, | Performed by: RADIOLOGY

## 2022-08-19 PROCEDURE — 76770 US EXAM ABDO BACK WALL COMP: CPT | Mod: TC,PO

## 2022-08-20 DIAGNOSIS — N13.39 OTHER HYDRONEPHROSIS: Primary | ICD-10-CM

## 2022-08-22 NOTE — PROGRESS NOTES
PATIENT: Tamie Fink  MRN: [id number]  DATE: 8/23/2022      Diagnosis:   1. Anemia, unspecified type    2. Stage 3a chronic kidney disease    3. Chronic obstructive pulmonary disease, unspecified COPD type    4. Asymptomatic microscopic hematuria        Chief Complaint: Anemia    Subjective:   HPI: Ms. Fink is a 89 y.o. female with COPD, HTN, HLD, CAD, CKD III who is seen today for evaluation of chronic anemia. She is accompanied by her daughter, Laura.    Patient has significant history of COPD and requires 3 L oxygen at home, follows with pulmonology Dr. Flores.   History of MI and stent placement, taking Plavix. Follows with cardiology.   Is currently planning to establish with a new urologist (Dr. Rodrigez) for evaluation of microscopic hematuria. This has been an ongoing problem. She is asymptomatic.   Review of labs dating back to 2012 show anemia with Hgb ranging from 9.4-12.2 g/dL. Does not recall having been told she was anemic prior to a year ago when she was asked to take PO iron supplements. Tolerated the PO supplements poorly due to constipation and has stopped them intermittently. She has been taking them daily for the past few weeks.   She remains active at home, cooking and cleaning. Lives with one son.   Former smoker quitting in 1984. No alcohol use.   Patient does endorse dizziness at times, fatigue, SOB. Denies HA, CP, cough, abd pain, diarrhea, N/V, melena, hematochezia, dysuria, swelling, new lumps or bumps, fevers, chills.     Past Medical History:   Past Medical History:   Diagnosis Date    Allergy     Arthritis     Asthma     GERD (gastroesophageal reflux disease)     HEARING LOSS     Hypertension     Lung disease     Pneumonia        Past Surgical HIstory:   Past Surgical History:   Procedure Laterality Date    CAROTID STENT         Family History:   Family History   Problem Relation Age of Onset    Cancer Mother     Heart disease Father     Breast cancer Sister     Breast cancer  Maternal Aunt     Breast cancer Maternal Grandmother     Breast cancer Cousin     Breast cancer Maternal Aunt        Social History:  reports that she quit smoking about 38 years ago. She has a 40.00 pack-year smoking history. She has never used smokeless tobacco. She reports that she does not drink alcohol and does not use drugs.    Allergies:  Review of patient's allergies indicates:   Allergen Reactions    Montelukast Other (See Comments)     Weakness and disorientation        Medications:  Current Outpatient Medications   Medication Sig Dispense Refill    atorvastatin (LIPITOR) 20 MG tablet Take 1 tablet (20 mg total) by mouth once daily. 90 tablet 4    benazepriL (LOTENSIN) 10 MG tablet Take 1 tablet (10 mg total) by mouth once daily. 90 tablet 4    clopidogreL (PLAVIX) 75 mg tablet TAKE 1 TABLET EVERY DAY 90 tablet 2    fluticasone-umeclidin-vilanter (TRELEGY ELLIPTA) 100-62.5-25 mcg DsDv Inhale 1 puff into the lungs.      ipratropium (ATROVENT HFA) 17 mcg/actuation inhaler Inhale 2 puffs into the lungs every 6 (six) hours. Rescue      linaCLOtide (LINZESS) 72 mcg Cap capsule Take 1 capsule (72 mcg total) by mouth once daily. 30 capsule 1    mupirocin (BACTROBAN) 2 % ointment Apply topically 2 (two) times daily. 30 g 0    nystatin (MYCOSTATIN) 100,000 unit/mL suspension swish FOUR MILLILITERS BY MOUTH then spit out. repeat FOUR TIMES DAILY FOR 10 DAYS 160 mL 0    omeprazole (PRILOSEC) 20 MG capsule Take 1 capsule (20 mg total) by mouth every evening. 90 capsule 4    OXYGEN-AIR DELIVERY SYSTEMS [facility] by Miscellaneous route Use as directed  2 L at home 3 L on ambulation.  MFallerLPN      pregabalin (LYRICA) 75 MG capsule TAKE 1 CAPSULE BY MOUTH TWICE A DAY 60 capsule 4    SENNOSIDES (SENOKOT ORAL) Take by mouth as needed.      traMADoL (ULTRAM) 50 mg tablet TAKE 1 TABLET EVERY 6 HOURS AS NEEDED FOR PAIN 120 tablet 0    albuterol (PROVENTIL/VENTOLIN HFA) 90 mcg/actuation inhaler Inhale 2 puffs  "into the lungs.       No current facility-administered medications for this visit.       Review of Systems   Constitutional: Negative for appetite change and unexpected weight change.   HENT: Negative for mouth sores and trouble swallowing.    Eyes: Positive for visual disturbance.   Respiratory: Positive for shortness of breath. Negative for cough.    Cardiovascular: Negative for chest pain.   Gastrointestinal: Positive for abdominal pain, constipation and diarrhea (after use of laxative ).   Genitourinary: Negative for frequency.   Musculoskeletal: Negative for back pain and joint swelling.   Skin: Negative for rash.   Neurological: Negative for light-headedness and headaches.   Hematological: Negative for adenopathy. Does not bruise/bleed easily.   Psychiatric/Behavioral: The patient is not nervous/anxious.        ECOG Performance Status:   ECOG SCORE           Objective:      Vitals:   Vitals:    08/23/22 1438   BP: 139/80   BP Location: Right arm   Patient Position: Sitting   BP Method: Medium (Automatic)   Pulse: 100   Resp: 18   Temp: 97.4 °F (36.3 °C)   TempSrc: Temporal   SpO2: 96%   Weight: 38.7 kg (85 lb 5.1 oz)   Height: 5' 4" (1.626 m)     BMI: Body mass index is 14.64 kg/m².    Physical Exam  Vitals reviewed.   Constitutional:       General: She is not in acute distress.     Appearance: She is not diaphoretic.   HENT:      Head: Normocephalic and atraumatic.   Eyes:      General: No scleral icterus.  Cardiovascular:      Rate and Rhythm: Normal rate and regular rhythm.      Heart sounds: Murmur heard.   Pulmonary:      Effort: Pulmonary effort is normal. No respiratory distress.      Breath sounds: No wheezing.      Comments: Oxygen 3 liters  Abdominal:      General: Bowel sounds are normal. There is no distension.      Tenderness: There is no abdominal tenderness.   Musculoskeletal:      Right lower leg: No edema.      Left lower leg: No edema.   Lymphadenopathy:      Cervical: No cervical adenopathy. "   Skin:     General: Skin is warm.      Coloration: Skin is not jaundiced.      Findings: No rash.   Neurological:      Mental Status: She is alert and oriented to person, place, and time.   Psychiatric:         Mood and Affect: Mood normal.         Behavior: Behavior normal.         Laboratory Data:  Lab Results   Component Value Date    WBC 11.09 08/23/2022    HGB 9.8 (L) 08/23/2022    HCT 32.6 (L) 08/23/2022     (H) 08/23/2022     08/23/2022          Imaging:   EXAMINATION:  US RETROPERITONEAL COMPLETE     CLINICAL HISTORY:  Other microscopic hematuria     TECHNIQUE:  Ultrasound of the kidneys and urinary bladder was performed including color flow and Doppler evaluation of the kidneys.     COMPARISON:  None.     FINDINGS:  Right kidney measures 9.8 cm in length, the left kidney measures 10.1 cm in length.  There is a cyst in the midpole of the right kidney.  A single thin septation is present.  Fluid is anechoic.  Cyst measures 1.2 x 0.7 x 1.0 cm.  Bosniak category 2.  No other cortical or medullary solid or cystic lesions.  No right-sided hydronephrosis.  Mild left hydronephrosis is present.     No calculi are identified in the renal collecting systems.  Limited color Doppler evaluation demonstrates normal flow to both renal verna.     Grayscale appearance of the bladder is within normal limits.  Left ureteral jet is present.  No right ureteral jet is identified.     Impression:     1. Mild left hydronephrosis.  Etiology of the hydronephrosis is indeterminate from today's ultrasound.  2. 1.2 cm right midpole cyst.  Bosniak category 2.        Electronically signed by: Jean-Claude Jerez MD  Date:                                            08/19/2022  Time:                                           15:01  Assessment:       1. Anemia, unspecified type    2. Stage 3a chronic kidney disease    3. Chronic obstructive pulmonary disease, unspecified COPD type    4. Asymptomatic microscopic hematuria          Plan:   Anemia  -Chronic dating back to at least 2012  -Likely anemia of chronic disease, chronic blood loss could be contributing with chronic microscopic hematuria  -New macrocytosis, will check B12 and folate, peripheral smear for path review   -Will check iron/TIBC, ferritin, STFR and replete if warranted  -Will check SPEP, FLC, LISA, reticulocyte count   -Discussed possible causes for her anemia with patient and daughter including chronic disease, nutritional deficiencies, malabsorption, MPN  -Follow up in one week to review results     Stage III CKD   -Followed by PCP   -Will monitor     COPD  -3 L O2 at home   -Follows with pulmonology    Hematuria, microscopic  -Asymptomatic, to establish with Dr. Rodrigez this week  -Monitor     Patient queried and all questions were answered.  Assessment/Plan reviewed and approved by Dr. Jacome     Route Chart for Scheduling    Med Onc Chart Routing      Follow up with physician    Follow up with YARELI Other. 1-2 weeks to review lab results    Infusion scheduling note    Injection scheduling note    Labs    Lab interval:  Labs today: CBC, peripheral smear for review, SPEP, LISA, FLC, retic, B12, folate, Ferritin, Iron/TIBC, STFR    Imaging    Pharmacy appointment    Other referrals

## 2022-08-23 ENCOUNTER — LAB VISIT (OUTPATIENT)
Dept: LAB | Facility: HOSPITAL | Age: 87
End: 2022-08-23
Payer: MEDICARE

## 2022-08-23 ENCOUNTER — OFFICE VISIT (OUTPATIENT)
Dept: HEMATOLOGY/ONCOLOGY | Facility: CLINIC | Age: 87
End: 2022-08-23
Payer: MEDICARE

## 2022-08-23 VITALS
HEIGHT: 64 IN | DIASTOLIC BLOOD PRESSURE: 80 MMHG | TEMPERATURE: 97 F | WEIGHT: 85.31 LBS | OXYGEN SATURATION: 96 % | HEART RATE: 100 BPM | BODY MASS INDEX: 14.57 KG/M2 | RESPIRATION RATE: 18 BRPM | SYSTOLIC BLOOD PRESSURE: 139 MMHG

## 2022-08-23 DIAGNOSIS — D64.9 ANEMIA, UNSPECIFIED TYPE: ICD-10-CM

## 2022-08-23 DIAGNOSIS — D64.9 ANEMIA, UNSPECIFIED TYPE: Primary | ICD-10-CM

## 2022-08-23 DIAGNOSIS — N18.31 STAGE 3A CHRONIC KIDNEY DISEASE: ICD-10-CM

## 2022-08-23 DIAGNOSIS — R31.21 ASYMPTOMATIC MICROSCOPIC HEMATURIA: ICD-10-CM

## 2022-08-23 DIAGNOSIS — J44.9 CHRONIC OBSTRUCTIVE PULMONARY DISEASE, UNSPECIFIED COPD TYPE: ICD-10-CM

## 2022-08-23 LAB
BASOPHILS # BLD AUTO: 0.05 K/UL (ref 0–0.2)
BASOPHILS NFR BLD: 0.5 % (ref 0–1.9)
DIFFERENTIAL METHOD: ABNORMAL
EOSINOPHIL # BLD AUTO: 0.1 K/UL (ref 0–0.5)
EOSINOPHIL NFR BLD: 0.9 % (ref 0–8)
ERYTHROCYTE [DISTWIDTH] IN BLOOD BY AUTOMATED COUNT: 14.5 % (ref 11.5–14.5)
FERRITIN SERPL-MCNC: 154 NG/ML (ref 20–300)
FOLATE SERPL-MCNC: 9.5 NG/ML (ref 4–24)
HCT VFR BLD AUTO: 32.6 % (ref 37–48.5)
HGB BLD-MCNC: 9.8 G/DL (ref 12–16)
IMM GRANULOCYTES # BLD AUTO: 0.06 K/UL (ref 0–0.04)
IMM GRANULOCYTES NFR BLD AUTO: 0.5 % (ref 0–0.5)
IRON SERPL-MCNC: 43 UG/DL (ref 30–160)
LYMPHOCYTES # BLD AUTO: 1.2 K/UL (ref 1–4.8)
LYMPHOCYTES NFR BLD: 10.6 % (ref 18–48)
MCH RBC QN AUTO: 30.4 PG (ref 27–31)
MCHC RBC AUTO-ENTMCNC: 30.1 G/DL (ref 32–36)
MCV RBC AUTO: 101 FL (ref 82–98)
MONOCYTES # BLD AUTO: 0.8 K/UL (ref 0.3–1)
MONOCYTES NFR BLD: 7.2 % (ref 4–15)
NEUTROPHILS # BLD AUTO: 8.9 K/UL (ref 1.8–7.7)
NEUTROPHILS NFR BLD: 80.3 % (ref 38–73)
NRBC BLD-RTO: 0 /100 WBC
PATH REV BLD -IMP: NORMAL
PLATELET # BLD AUTO: 178 K/UL (ref 150–450)
PMV BLD AUTO: 10.5 FL (ref 9.2–12.9)
RBC # BLD AUTO: 3.22 M/UL (ref 4–5.4)
RETICS/RBC NFR AUTO: 1.4 % (ref 0.5–2.5)
SATURATED IRON: 14 % (ref 20–50)
TOTAL IRON BINDING CAPACITY: 299 UG/DL (ref 250–450)
TRANSFERRIN SERPL-MCNC: 202 MG/DL (ref 200–375)
VIT B12 SERPL-MCNC: 654 PG/ML (ref 210–950)
WBC # BLD AUTO: 11.09 K/UL (ref 3.9–12.7)

## 2022-08-23 PROCEDURE — 85045 AUTOMATED RETICULOCYTE COUNT: CPT | Mod: PN

## 2022-08-23 PROCEDURE — 1100F PTFALLS ASSESS-DOCD GE2>/YR: CPT | Mod: CPTII,S$GLB,,

## 2022-08-23 PROCEDURE — 84165 PROTEIN E-PHORESIS SERUM: CPT | Mod: 26,,, | Performed by: PATHOLOGY

## 2022-08-23 PROCEDURE — 3288F PR FALLS RISK ASSESSMENT DOCUMENTED: ICD-10-PCS | Mod: CPTII,S$GLB,,

## 2022-08-23 PROCEDURE — 84165 PATHOLOGIST INTERPRETATION SPE: ICD-10-PCS | Mod: 26,,, | Performed by: PATHOLOGY

## 2022-08-23 PROCEDURE — 86334 IMMUNOFIX E-PHORESIS SERUM: CPT

## 2022-08-23 PROCEDURE — 99205 PR OFFICE/OUTPT VISIT, NEW, LEVL V, 60-74 MIN: ICD-10-PCS | Mod: S$GLB,,,

## 2022-08-23 PROCEDURE — 3288F FALL RISK ASSESSMENT DOCD: CPT | Mod: CPTII,S$GLB,,

## 2022-08-23 PROCEDURE — 1159F PR MEDICATION LIST DOCUMENTED IN MEDICAL RECORD: ICD-10-PCS | Mod: CPTII,S$GLB,,

## 2022-08-23 PROCEDURE — 99205 OFFICE O/P NEW HI 60 MIN: CPT | Mod: S$GLB,,,

## 2022-08-23 PROCEDURE — 82746 ASSAY OF FOLIC ACID SERUM: CPT

## 2022-08-23 PROCEDURE — 86334 PATHOLOGIST INTERPRETATION IFE: ICD-10-PCS | Mod: 26,,, | Performed by: PATHOLOGY

## 2022-08-23 PROCEDURE — 82728 ASSAY OF FERRITIN: CPT

## 2022-08-23 PROCEDURE — 84165 PROTEIN E-PHORESIS SERUM: CPT

## 2022-08-23 PROCEDURE — 83520 IMMUNOASSAY QUANT NOS NONAB: CPT

## 2022-08-23 PROCEDURE — 84238 ASSAY NONENDOCRINE RECEPTOR: CPT

## 2022-08-23 PROCEDURE — 82607 VITAMIN B-12: CPT

## 2022-08-23 PROCEDURE — 85060 PATHOLOGIST REVIEW: ICD-10-PCS | Mod: ,,, | Performed by: PATHOLOGY

## 2022-08-23 PROCEDURE — 1159F MED LIST DOCD IN RCRD: CPT | Mod: CPTII,S$GLB,,

## 2022-08-23 PROCEDURE — 1126F AMNT PAIN NOTED NONE PRSNT: CPT | Mod: CPTII,S$GLB,,

## 2022-08-23 PROCEDURE — 85060 BLOOD SMEAR INTERPRETATION: CPT | Mod: ,,, | Performed by: PATHOLOGY

## 2022-08-23 PROCEDURE — 99999 PR PBB SHADOW E&M-EST. PATIENT-LVL IV: CPT | Mod: PBBFAC,,,

## 2022-08-23 PROCEDURE — 99999 PR PBB SHADOW E&M-EST. PATIENT-LVL IV: ICD-10-PCS | Mod: PBBFAC,,,

## 2022-08-23 PROCEDURE — 84466 ASSAY OF TRANSFERRIN: CPT

## 2022-08-23 PROCEDURE — 1126F PR PAIN SEVERITY QUANTIFIED, NO PAIN PRESENT: ICD-10-PCS | Mod: CPTII,S$GLB,,

## 2022-08-23 PROCEDURE — 1100F PR PT FALLS ASSESS DOC 2+ FALLS/FALL W/INJURY/YR: ICD-10-PCS | Mod: CPTII,S$GLB,,

## 2022-08-23 PROCEDURE — 86334 IMMUNOFIX E-PHORESIS SERUM: CPT | Mod: 26,,, | Performed by: PATHOLOGY

## 2022-08-23 PROCEDURE — 85025 COMPLETE CBC W/AUTO DIFF WBC: CPT | Mod: PN

## 2022-08-24 PROBLEM — R31.21 ASYMPTOMATIC MICROSCOPIC HEMATURIA: Status: ACTIVE | Noted: 2022-08-24

## 2022-08-24 PROBLEM — R20.0 NUMBNESS AND TINGLING OF BOTH FEET: Status: ACTIVE | Noted: 2022-08-24

## 2022-08-24 PROBLEM — R20.2 NUMBNESS AND TINGLING OF BOTH FEET: Status: ACTIVE | Noted: 2022-08-24

## 2022-08-24 LAB
ALBUMIN SERPL ELPH-MCNC: 3.05 G/DL (ref 3.35–5.55)
ALPHA1 GLOB SERPL ELPH-MCNC: 0.43 G/DL (ref 0.17–0.41)
ALPHA2 GLOB SERPL ELPH-MCNC: 0.87 G/DL (ref 0.43–0.99)
B-GLOBULIN SERPL ELPH-MCNC: 1.02 G/DL (ref 0.5–1.1)
GAMMA GLOB SERPL ELPH-MCNC: 2.43 G/DL (ref 0.67–1.58)
INTERPRETATION SERPL IFE-IMP: NORMAL
KAPPA LC SER QL IA: 12.78 MG/DL (ref 0.33–1.94)
KAPPA LC/LAMBDA SER IA: 1.53 (ref 0.26–1.65)
LAMBDA LC SER QL IA: 8.37 MG/DL (ref 0.57–2.63)
PROT SERPL-MCNC: 7.8 G/DL (ref 6–8.4)

## 2022-08-24 NOTE — ASSESSMENT & PLAN NOTE
Chronic, stable  Continue to monitor renal function  Avoid NSAIDs  Following with nephrology     BMP  Lab Results   Component Value Date     08/12/2022    K 4.7 08/12/2022    CL 87 (L) 08/12/2022    CO2 38 (H) 08/12/2022    BUN 17 08/12/2022    CREATININE 0.6 08/12/2022    CALCIUM 9.3 08/12/2022    ANIONGAP 11 08/12/2022    ESTGFRAFRICA >60.0 02/03/2022    EGFRNONAA >60.0 02/03/2022

## 2022-08-24 NOTE — ASSESSMENT & PLAN NOTE
Chronic, stable. Chronic GERARDO.   Using inhalers as prescribed  Currently using 3L supplemental oxygen; she reports monitoring oxygen levels at home  Following with pulmonology

## 2022-08-25 LAB — PATH REV BLD -IMP: NORMAL

## 2022-08-26 ENCOUNTER — OFFICE VISIT (OUTPATIENT)
Dept: NEPHROLOGY | Facility: CLINIC | Age: 87
End: 2022-08-26
Payer: MEDICARE

## 2022-08-26 VITALS
WEIGHT: 85 LBS | BODY MASS INDEX: 14.51 KG/M2 | HEIGHT: 64 IN | DIASTOLIC BLOOD PRESSURE: 56 MMHG | SYSTOLIC BLOOD PRESSURE: 132 MMHG

## 2022-08-26 DIAGNOSIS — I10 ESSENTIAL HYPERTENSION: Primary | ICD-10-CM

## 2022-08-26 DIAGNOSIS — N18.31 STAGE 3A CHRONIC KIDNEY DISEASE: ICD-10-CM

## 2022-08-26 DIAGNOSIS — R26.81 UNSTEADY GAIT: Chronic | ICD-10-CM

## 2022-08-26 DIAGNOSIS — Z79.899 ENCOUNTER FOR LONG-TERM (CURRENT) USE OF MEDICATIONS: ICD-10-CM

## 2022-08-26 DIAGNOSIS — D64.9 ANEMIA, UNSPECIFIED TYPE: ICD-10-CM

## 2022-08-26 DIAGNOSIS — E43 SEVERE PROTEIN-CALORIE MALNUTRITION: Chronic | ICD-10-CM

## 2022-08-26 DIAGNOSIS — I42.1 HOCM (HYPERTROPHIC OBSTRUCTIVE CARDIOMYOPATHY): Chronic | ICD-10-CM

## 2022-08-26 LAB — STFR SERPL-MCNC: 2.1 MG/L (ref 1.8–4.6)

## 2022-08-26 PROCEDURE — 99204 PR OFFICE/OUTPT VISIT, NEW, LEVL IV, 45-59 MIN: ICD-10-PCS | Mod: S$GLB,,, | Performed by: INTERNAL MEDICINE

## 2022-08-26 PROCEDURE — 1100F PR PT FALLS ASSESS DOC 2+ FALLS/FALL W/INJURY/YR: ICD-10-PCS | Mod: CPTII,S$GLB,, | Performed by: INTERNAL MEDICINE

## 2022-08-26 PROCEDURE — 1160F RVW MEDS BY RX/DR IN RCRD: CPT | Mod: CPTII,S$GLB,, | Performed by: INTERNAL MEDICINE

## 2022-08-26 PROCEDURE — 3288F PR FALLS RISK ASSESSMENT DOCUMENTED: ICD-10-PCS | Mod: CPTII,S$GLB,, | Performed by: INTERNAL MEDICINE

## 2022-08-26 PROCEDURE — 99999 PR PBB SHADOW E&M-EST. PATIENT-LVL III: CPT | Mod: PBBFAC,,, | Performed by: INTERNAL MEDICINE

## 2022-08-26 PROCEDURE — 99999 PR PBB SHADOW E&M-EST. PATIENT-LVL III: ICD-10-PCS | Mod: PBBFAC,,, | Performed by: INTERNAL MEDICINE

## 2022-08-26 PROCEDURE — 1159F PR MEDICATION LIST DOCUMENTED IN MEDICAL RECORD: ICD-10-PCS | Mod: CPTII,S$GLB,, | Performed by: INTERNAL MEDICINE

## 2022-08-26 PROCEDURE — 1159F MED LIST DOCD IN RCRD: CPT | Mod: CPTII,S$GLB,, | Performed by: INTERNAL MEDICINE

## 2022-08-26 PROCEDURE — 1160F PR REVIEW ALL MEDS BY PRESCRIBER/CLIN PHARMACIST DOCUMENTED: ICD-10-PCS | Mod: CPTII,S$GLB,, | Performed by: INTERNAL MEDICINE

## 2022-08-26 PROCEDURE — 3288F FALL RISK ASSESSMENT DOCD: CPT | Mod: CPTII,S$GLB,, | Performed by: INTERNAL MEDICINE

## 2022-08-26 PROCEDURE — 1100F PTFALLS ASSESS-DOCD GE2>/YR: CPT | Mod: CPTII,S$GLB,, | Performed by: INTERNAL MEDICINE

## 2022-08-26 PROCEDURE — 99204 OFFICE O/P NEW MOD 45 MIN: CPT | Mod: S$GLB,,, | Performed by: INTERNAL MEDICINE

## 2022-08-26 NOTE — PROGRESS NOTES
"Subjective:       Patient ID: Tamie Fink is a 89 y.o. White female who presents for initial evaluation of CKD referred by PCP, dr Tyler.     Renal function per chart review with sr cr baseline of 0.6 - 0.7 mg/dL with decreased muscle mass. Renal US with Bosniak 2. Hematuria and proteinuria in a setting of rare bacteriuria.    - HTN Patient reports good adherence to the current regiment, which includes Lotensin. They are not following low salt diet. Never hospitalized for uncontrolled HTN or hypotension/syncopal episodes.    Denies use of NSAIDs, nephrolithiasis, DM or fam Hx of renal disease.      Review of Systems   Constitutional: Positive for fatigue. Negative for chills and fever.   HENT: Negative for hearing loss, trouble swallowing and voice change.    Respiratory: Positive for shortness of breath. Negative for cough and wheezing.    Cardiovascular: Positive for palpitations. Negative for chest pain and leg swelling.   Gastrointestinal: Negative for abdominal distention, abdominal pain, constipation and diarrhea.   Endocrine: Negative for polydipsia, polyphagia and polyuria.   Genitourinary: Negative for dysuria, flank pain, frequency and hematuria.   Musculoskeletal: Negative for arthralgias, back pain and myalgias.   Skin: Negative for rash and wound.   Neurological: Negative for dizziness, syncope, weakness and light-headedness.   Hematological: Negative for adenopathy. Does not bruise/bleed easily.       The past medical, family and social histories were reviewed for this encounter.     BP (!) 132/56 (BP Location: Right arm, Patient Position: Sitting, BP Method: Medium (Manual))   Ht 5' 4" (1.626 m)   Wt 38.6 kg (85 lb)   BMI 14.59 kg/m²     Objective:      Physical Exam  Vitals and nursing note reviewed.   Constitutional:       Appearance: Normal appearance. She is ill-appearing.      Comments: Frail appearing   HENT:      Head: Normocephalic and atraumatic.      Nose:      Comments: NC with " O2 supplementation     Mouth/Throat:      Mouth: Mucous membranes are moist.      Pharynx: Oropharynx is clear.   Eyes:      Extraocular Movements: Extraocular movements intact.      Conjunctiva/sclera: Conjunctivae normal.   Neck:      Vascular: No carotid bruit.   Cardiovascular:      Rate and Rhythm: Normal rate and regular rhythm.      Heart sounds: Normal heart sounds.   Pulmonary:      Effort: Pulmonary effort is normal. No respiratory distress.      Breath sounds: Normal breath sounds.   Abdominal:      General: Abdomen is flat. Bowel sounds are normal. There is no distension.      Palpations: Abdomen is soft.      Tenderness: There is no abdominal tenderness.   Musculoskeletal:         General: Normal range of motion.      Cervical back: Normal range of motion. No tenderness.   Lymphadenopathy:      Cervical: No cervical adenopathy.   Skin:     General: Skin is warm and dry.      Capillary Refill: Capillary refill takes less than 2 seconds.      Coloration: Skin is not jaundiced.   Neurological:      General: No focal deficit present.      Mental Status: She is alert.      Gait: Gait abnormal.      Comments: Uses walker   Psychiatric:         Mood and Affect: Mood normal.         Behavior: Behavior normal.         Judgment: Judgment normal.         Assessment:       1. Essential hypertension    2. Encounter for long-term (current) use of medications    3. Stage 3a chronic kidney disease    4. HOCM (hypertrophic obstructive cardiomyopathy)    5. Anemia, unspecified type    6. Severe protein-calorie malnutrition    7. Unsteady gait        Plan:   Return to clinic PRN    CKD stage G3a in a setting of aging and HTN  Baseline creatinine is 0.6 mg/dL  Lab Results   Component Value Date    CREATININE 0.6 08/12/2022      - Complete avoidance of NSAIDs   - Dose adjust medications to GFR of 45-60   - Avoid nephrotoxins including IV contrast   - Decided to take conservative approach     Mitali 2   - Single septation  on recent US   - 0% being malignant    - Will not survey in the future    HTN   - BP well controlled: continue current medications, avoid hypotension and dehydration    Malnutrition    - Recommended cont of protein supplementation     Anemia   - Constipation    - Not on supplement     Med changes: none

## 2022-08-30 LAB — PATHOLOGIST INTERPRETATION IFE: NORMAL

## 2022-08-31 LAB — PATHOLOGIST INTERPRETATION SPE: NORMAL

## 2022-09-02 ENCOUNTER — PATIENT MESSAGE (OUTPATIENT)
Dept: FAMILY MEDICINE | Facility: CLINIC | Age: 87
End: 2022-09-02
Payer: MEDICARE

## 2022-09-06 DIAGNOSIS — B37.0 THRUSH: ICD-10-CM

## 2022-09-06 RX ORDER — NYSTATIN 100000 [USP'U]/ML
SUSPENSION ORAL
Qty: 160 ML | Refills: 0 | Status: SHIPPED | OUTPATIENT
Start: 2022-09-06

## 2022-09-06 NOTE — TELEPHONE ENCOUNTER
----- Message from Carmen Patino sent at 9/6/2022 11:26 AM CDT -----  Regarding: refill  Contact: patient  Type:  RX Refill Request    Who Called: patient  Refill or New Rx:refill  RX Name and Strength:Ferrous Sulfate-325mg  How is the patient currently taking it? (ex. 1XDay):once daily  Is this a 30 day or 90 day RX:90  Preferred Pharmacy with phone number:[facility]  Local or Mail Order:local  Ordering Provider:Dr Tyler  Would the patient rather a call back or a response via [facility]? call  Best Call Back Number:[phone]  Additional Information:     Type:  RX Refill Request    Who Called: patient  Refill or New Rx:refill  RX Name and Strength:Mycostatin-  How is the patient currently taking it? (ex. 1XDay):4 times daily  Is this a 30 day or 90 day RX:  Preferred Pharmacy with phone number:[facility]  Local or Mail Order:local  Ordering Provider:Dr Perez  Would the patient rather a call back or a response via [facility]? call  Best Call Back Number:[phone]  Additional Information:

## 2024-01-11 NOTE — PROGRESS NOTES
Transitional Care Note  Subjective:       Patient ID: Tamie Fink is a 86 y.o. female.  Chief Complaint: No chief complaint on file.    Family and/or Caretaker present at visit?  No.  Diagnostic tests reviewed/disposition: No diagnosic tests pending after this hospitalization.  Disease/illness education: Pn COPD Hypotension Syncope  Home health/community services discussion/referrals: Patient does not have home health established from hospital visit.  They do not need home health.  If needed, we will set up home health for the patient.   Establishment or re-establishment of referral orders for community resources: No other necessary community resources.   Discussion with other health care providers: No discussion with other health care providers necessary.   HPI  Review of Systems    Objective:      Physical Exam    Assessment:       No diagnosis found.    Plan:       See summary following todays clinical assessment below copied from [facility]          Past Medical History:   Diagnosis Date    Allergy     Arthritis     Asthma     GERD (gastroesophageal reflux disease)     HEARING LOSS     Hypertension     Lung disease     Pneumonia      Past Surgical History:   Procedure Laterality Date    CAROTID STENT       Review of patient's allergies indicates:  No Known Allergies  Current Outpatient Prescriptions on File Prior to Visit   Medication Sig Dispense Refill    atorvastatin (LIPITOR) 20 MG tablet Take 1 tablet (20 mg total) by mouth once daily. 30 tablet 11    benazepril (LOTENSIN) 5 MG tablet TAKE ONE TABLET BY MOUTH ONCE DAILY 30 tablet 11    clopidogrel (PLAVIX) 75 mg tablet TAKE ONE TABLET BY MOUTH ONCE DAILY 30 tablet 11    fluticasone (FLONASE) 50 mcg/actuation nasal spray USE TWO SPRAYS IN EACH NOSTRIL EVERY DAY 16 g 12    fluticasone-salmeterol 250-50 mcg/dose (ADVAIR) 250-50 mcg/dose diskus inhaler Inhale 1 puff into the lungs 2 (two) times daily. 60 each 5    gabapentin (NEURONTIN) 300 MG  capsule TAKE THREE CAPSULES BY MOUTH IN THE EVENING 90 capsule 11    ipratropium (ATROVENT HFA) 17 mcg/actuation inhaler Inhale 2 puffs into the lungs every 6 (six) hours as needed for Wheezing. 12.9 g 11    meclizine (ANTIVERT) 12.5 mg tablet Take 2 tablets (25 mg total) by mouth 3 (three) times daily as needed. 30 tablet 0    omeprazole (PRILOSEC) 20 MG capsule Take 1 capsule (20 mg total) by mouth every evening. 90 capsule 3    SENNOSIDES (SENOKOT ORAL) Take by mouth as needed.      traMADol (ULTRAM) 50 mg tablet TAKE 1 TABLET BY MOUTH EVERY 6 HOURS AS NEEDED 60 tablet 1     No current facility-administered medications on file prior to visit.      Social History     Social History    Marital status:      Spouse name: N/A    Number of children: N/A    Years of education: N/A     Occupational History    Not on file.     Social History Main Topics    Smoking status: Former Smoker     Packs/day: 1.00     Years: 40.00     Quit date: 5/3/1984    Smokeless tobacco: Never Used    Alcohol use No    Drug use: No    Sexual activity: Not on file     Other Topics Concern    Not on file     Social History Narrative    No narrative on file     Family History   Problem Relation Age of Onset    Cancer Mother     Heart disease Father          ROS:  SKIN: No rashes, itching or changes in color or texture of skin.  EYES: Visual acuity fine. No photophobia, ocular pain or diplopia.EARS: Denies ear pain, discharge or vertigo.NOSE: No loss of smell, no epistaxis some postnasal drip.MOUTH & THROAT: No hoarseness or change in voice. No excessive gum bleeding.CHEST: Denies GERARDO, cyanosis, wheezing  CARDIOVASCULAR: Denies chest pain, PND, orthopnea or reduced exercise tolerance.  ABDOMEN:  No weight loss.No abdominal pain, no hematemesis or blood in stool.  URINARY: No flank pain, dysuria or hematuria.  PERIPHERAL VASCULAR: No claudication or cyanosis.  MUSCULOSKELETAL: Negative   NEUROLOGIC: No history of seizures,  [de-identified] : The excision sites for the 2 chronic abscesses of the right buttock wound is completely healed at the lateral aspect of each incision where the rubber band drains came out the are still open but minimal drainage. paralysis, alteration of gait or coordination.    PE: Vital signs as noted  Heent:Normocephalic with no recent cranial trauma,PERRLA,EOMI,conjunctiva clear,fundi reveal no hemmorhage exudate or papilledema.Otic canals clear, tympanic membranes slightly dull bilaterally.Nasal mucosa slightly red and edematous.Posterior pharynx slightly red but without exudate.  Neck:Supple with minimal anterior cervical adenopathy.  Chest:Clear bilateral breath sounds with mild scattered ronchi  Heart:Regular rhthym without murmer  Abdomen:Soft, non tender,no masses, no hepatosplenomegalyExtremeties and Neurologic:Grossly within normal limits          [facility] Diagnosis: Hypokalemia [E87.6]  Lactic acidosis [E87.2]  Colitis [K52.9]  Hyponatremia [E87.1]  Hypoalbuminemia [E88.09]  Generalized abdominal pain [R10.84]  Cavitary lesion of lung [J98.4]  Transient hypotension [I95.9]  Abrasion of face, initial encounter [S00.81XA]  Fall, initial encounter [W19.XXXA]  Atherosclerosis [I70.90]  Hypotension [I95.9]  Anemia, unspecified type [D64.9]  Diarrhea, unspecified type [R19.7]  Hypotension [I95.9]    History of Present IllnessMsNatalie Fink is an 86-year-old  female who presented to the [facility] emergency department on today via ambulance from home following a fall. The patient provides her own history. The patient states that this morning, she had some abdominal pain and felt as if she needed to have a bowel movement. She states when she sat on the toilet, she was not able to pass any stool. She states that she stood and a few seconds later, she fell. Patient denies loss of consciousness. She endorses intense abdominal pain which is constant in nature. She has had several epidsodes of watery diarrhea since arriving in ED. She denies any sick contacts. She did say that she has been visiting a friend in the hospital with her son daily. She says she has been coughing up white/clear phlegm. She denies any fever, chills,  "nausea, or vomiting prior to today. She says that she does have some dyspnea on exertion, but says that this is normal for her.        Present Illness:  Active Hospital Problems   Diagnosis Date Noted    Hypotension 12/23/2017    Enterocolitis 12/23/2017    Left upper lobe pneumonia 12/23/2017     Resolved Hospital Problems   Diagnosis Date Noted Date Resolved     Past Medical History:  Past Medical History:   Diagnosis Date    Abnormal chest x-ray 2005   pt states she had area on lung that was dx as scar tissue from h/o pneumonia    Angina    Arthritis   osteo    Asthma    Bronchiolectasis    COPD (chronic obstructive pulmonary disease)    Coronary artery disease 2/12   stent at [facility] Dr. Camejo    Degenerative joint disease involving multiple joints 2/12    Dizziness    GERD (gastroesophageal reflux disease)    History of chest pain    History of gastroesophageal reflux (GERD)    History of vertigo 2/27/12    Hyperlipidemia    Hypertension    Low back pain    Non-ST elevation myocardial infarction (NSTEMI) 2/12    Pneumonia   "hospitalized twice about 55 years ago for"    Sleep apnea   has not followed up to get CPAP yet    Spinal stenosis in cervical region 2/12       Past Surgical History:  Past Surgical History:   Procedure Laterality Date    Bronchoscopy   "55 yrs ago when I had pneumonia; no other findings"    Bronchoscopy 7/25/2013   Procedure: BRONCHOSCOPY; Surgeon: Asad Corbin MD; Location: [facility] END[facility]; Service: Pulmonary; Laterality: N/A; with washing    Cataract extraction   "just one eye done, not sure which one"    Cataract extraction 10/01/2013   Rt. Eye    Coronary angioplasty   Feb 2012 x 1 stent    Epidural shot for back pain     Social History: Lives at home with son and grandson.  Social History   Substance Use Topics    Smoking status: Former Smoker   Packs/day: 2.00   Years: 35.00   Types: Cigarettes   Quit date: 1/1/1984    Smokeless tobacco: Never Used "   Comment: Quit in 1984.    Alcohol use No     History   Drug Use No     Family History:  Family History   Problem Relation Age of Onset    Cancer Mother    Heart disease Father    Hypertension Father    Heart failure Father    Breast cancer Sister     Allergies:  No Known Allergies    Meds Prior to Arrival:  Prior to Admission medications   Medication Sig Start Date End Date Taking? Authorizing Provider   ADVAIR DISKUS 250-50 mcg/dose diskus inhaler Inhale 1 puff into the lungs 2 (two) times daily. 6/13/17 Yes Kyra Roberts NP   atorvastatin (LIPITOR) 20 MG tablet Take 20 mg by mouth nightly. 1/31/13 Yes Information, Historical   benazepril (LOTENSIN) 5 MG tablet Take 5 mg by mouth daily. Yes Information, Historical   clopidogrel (PLAVIX) 75 mg tablet Take 75 mg by mouth daily. Yes Information, Historical   GRALISE 600 mg Tb24 Extended Release 24 hr tablet Use as directed 600 mg in the mouth or throat nightly. 6/20/15 Yes Information, Historical   ipratropium (ATROVENT HFA) 17 mcg/actuation inhaler Inhale 2 puffs into the lungs every 6 (six) hours. 1/6/15 Yes Kyra Roberts NP   metoprolol (TOPROL-XL) 25 MG 24 hr tablet Take 25 mg by mouth nightly. Take half tablet 12.5 mg nightly Yes Information, Historical   mometasone (NASONEX) 50 mcg/actuation nasal spray 2 sprays by Nasal route daily. 1/6/15 Yes Kyra Roberts NP   omeprazole (PRILOSEC) 20 MG capsule TAKE ONE CAPSULE BY MOUTH at night 2/6/17 Yes Kyra Roberts NP   traMADol (ULTRAM) 50 mg tablet Take 50 mg by mouth nightly. Yes Information, Historical       Review of Systems:  Review of Systems   Constitutional: Positive for appetite change. Negative for chills, fatigue and fever.   HENT: Positive for congestion, postnasal drip and rhinorrhea. Negative for sneezing, sore throat and trouble swallowing.   Eyes: Negative for pain, discharge, redness and itching.   Respiratory: Positive for cough. Negative for apnea, choking, chest  tightness and wheezing.   Cardiovascular: Negative for chest pain, palpitations and leg swelling.   Gastrointestinal: Positive for abdominal pain, diarrhea, nausea and vomiting. Negative for anal bleeding and constipation.   Endocrine: Negative for cold intolerance, heat intolerance, polydipsia, polyphagia and polyuria.   Genitourinary: Negative for difficulty urinating, dyspareunia, dysuria, flank pain and hematuria.   Musculoskeletal: Positive for arthralgias. Negative for gait problem.   Skin: Negative for rash and wound.   Neurological: Positive for light-headedness. Negative for dizziness, seizures, speech difficulty and headaches.   Hematological: Negative for adenopathy. Does not bruise/bleed easily.   Psychiatric/Behavioral: Negative for agitation, behavioral problems, confusion and hallucinations.       Objective  Recent Vitals:  Last Filed Vitals   12/23/17 1139 12/23/17 1227 12/23/17 1329 12/23/17 1533   BP: 119/59 112/59 126/56 130/68   Pulse: 75 66 68 81   Resp: 18   Temp:   TempSrc:   SpO2: 98%   Weight:   Height:   PainSc:     Last Pain Assessment:  Pain Assessment: Numeric Rating / Visual Analog  Pain Score: 10-Worst possible pain  Pain Location: Abdomen    Exam:  Physical Exam   Constitutional: She is oriented to person, place, and time. She appears well-developed. No distress.   Thin-build.   HENT:   Head: Normocephalic.   Right Ear: External ear normal.   Left Ear: External ear normal.   Tiny laceration on bridge of nose.   Eyes: Conjunctivae and EOM are normal. Pupils are equal, round, and reactive to light. No scleral icterus.   Cardiovascular: Normal rate, regular rhythm, normal heart sounds and intact distal pulses.   Pulmonary/Chest: She exhibits no tenderness.   Decreased breath sounds. Rhonchorus breath sounds in left lower lung field.   Abdominal: Soft. Bowel sounds are normal. There is tenderness.   Musculoskeletal: Normal range of motion. She exhibits no edema.   Neurological: She is  alert and oriented to person, place, and time.   Skin: Skin is warm and dry.   Psychiatric: She has a normal mood and affect. Her behavior is normal. Judgment and thought content normal.     Lab Results:  Recent Results (from the past 36 hour(s))   Rapid influenza A/B antigens   Collection Time: 12/23/17 10:19 AM   Result Value Ref Range   Influenza A Ag NEG NEGATIVE   Influenza B Ag NEG NEGATIVE   CBC with Differential   Collection Time: 12/23/17 10:24 AM   Result Value Ref Range   WBC 9.7 4.8 - 10.8 10*3/uL   RBC 3.69 (L) 4.20 - 5.40 10*6/uL   HGB 11.0 (L) 12.0 - 16.0 g/dL   HCT 33.8 (L) 37.0 - 47.0 %   MCV 91.5 81.0 - 99.0 fL   MCH 29.7 27.0 - 31.0 pg   MCHC 32.5 (L) 33.0 - 37.0 g/dL   RDW 14.5 11.5 - 14.5 %   Platelet Count 303 130 - 400 10*3/uL   MPV 8.1 7.4 - 10.4 fL   Neutrophils Percent 62.0 36.0 - 66.0 %   Lymphocytes Percent 32.0 21.0 - 50.0 %   Monocytes Percent 4.0 2.0 - 10.0 %   Eosinophils Percent 2.0 0.0 - 10.0 %   Basophils Percent 1.0 0.0 - 1.0 %   Neutrophils Absolute 6.0 1.4 - 6.5 10*3/uL   Lymphocytes Absolute 3.1 1.2 - 3.4 10*3/uL   Monocytes Absolute 0.4 0.1 - 1.0 10*3/uL   Eosinophils Absolute 0.2 0.0 - 0.7 10*3/uL   Basophils Absolute 0.1 0.0 - 0.2 10*3/uL   Comprehensive metabolic panel   Collection Time: 12/23/17 10:24 AM   Result Value Ref Range   Glucose 138 (H) 65 - 99 mg/dL   Sodium 135 (L) 136 - 144 mmol/L   Potassium 3.5 (L) 3.6 - 5.1 mmol/L   Chloride 102 101 - 111 mmol/L   CO2 27 22 - 32 mmol/L   BUN 11 8 - 20 mg/dL   Calcium 8.5 (L) 8.9 - 10.3 mg/dL   Creatinine 0.93 0.60 - 1.10 mg/dL   Albumin 2.7 (L) 3.5 - 4.8 g/dL   Total Bilirubin 0.4 0.4 - 2.0 mg/dL   ALKP 65 28 - 116 U/L   Total Protein 6.0 (L) 6.1 - 7.9 g/dL   ALT 11 5 - 41 U/L   AST 21 10 - 34 U/L   Anion Gap 6 (L) 7 - 16 mmol/L   Lipase   Collection Time: 12/23/17 10:24 AM   Result Value Ref Range   Lipase 29 8 - 57 U/L   CK-MB   Collection Time: 12/23/17 10:24 AM   Result Value Ref Range   CK-MB access 2.9 0.3 - 5.6 ng/mL    CK   Collection Time: 12/23/17 10:24 AM   Result Value Ref Range   CPK 50 5 - 235 U/L   Troponin I   Collection Time: 12/23/17 10:24 AM   Result Value Ref Range   Troponin I <0.03 0.00 - 0.04 ng/mL   Magnesium   Collection Time: 12/23/17 10:24 AM   Result Value Ref Range   MG 2.0 1.8 - 2.5 mg/dL   Lactic acid, plasma   Collection Time: 12/23/17 10:24 AM   Result Value Ref Range   Lactic Acid 2.40 (H) 0.50 - 2.20 mmol/L   Protime-INR Pt is NOT on Coumadin   Collection Time: 12/23/17 10:24 AM   Result Value Ref Range   Protime 12.4 9.4 - 12.5 sec   INR 1.15   APTT   Collection Time: 12/23/17 10:24 AM   Result Value Ref Range   PTT 25.3 25.1 - 36.5 sec   Glomerular Filtration Rate   Collection Time: 12/23/17 10:24 AM   Result Value Ref Range   GFR Non  57 (A) >59 mL/min   GFR African American >60 >59 mL/min   URINALYSIS, COMPLETE   Collection Time: 12/23/17 10:27 AM   Result Value Ref Range   Urine type CATH   Color, Urine YELLOW   Appearance CLEAR   Glucose, Urine NEGATIVE NEGATIVE mg/dL   Bilirubin, Urine NEGATIVE NEGATIVE mg/dL   Ketones, Urine NEGATIVE NEGATIVE mg/dL   Specific Gravity, Urine 1.008 1.005 - 1.030   Blood, Urine NEGATIVE NEGATIVE   pH, Urine 6.5 4.5 - 8.0   Protein, Urine NEGATIVE NEGATIVE mg/dL   Urobilinogen 0.2 0.2 - 1.0 [Jann'U]/dL   Nitrite, Urine NEGATIVE NEGATIVE   Leuk. Esterase, Urine NEGATIVE NEGATIVE   RBC, Urine 2 0 - 4   WBC, Urine 0 0 - 5   Epith. Cells 0 0 - 2   Casts, Hyaline 1 0 - 2   Bacteria, Urine 0-5 0 - 5   NOHS Occult Blood Stool, Screening   Collection Time: 12/23/17 11:21 AM   Result Value Ref Range   Occult Blood NEG NEGATIVE   Lactic acid, plasma   Collection Time: 12/23/17 3:00 PM   Result Value Ref Range   Lactic Acid 0.90 0.50 - 2.20 mmol/L     Diagnostic Studies:  Ct Head Wo Contrast    Result Date: 12/23/2017  REASON FOR EXAM: fall TECHNICAL FACTORS: 5 mm contiguous axial CT images were obtained from the foramen magnum to the skull vertex.  COMPARISON: None FINDINGS: The ventricles and sulci are prominent. There is no evidence of acute intracranial hemorrhage or infarct. There is no evidence of mass, mass effect, or midline shift. Decreased attenuation is present within the periventricular white matter. No intra-axial or extra axial fluid collections. Basal cisterns are patent. Atheromatous calcifications of the carotid siphons. Symmetrical appearing orbits. Paranasal sinuses are clear. Osseous structures are unremarkable.     1. No acute intracranial abnormality. 2. Involutional changes. 3. Mild chronic small vessel ischemic disease. Electronically signed by Gregory Zambrano MD on 12/23/2017 12:26 PM     Ct Maxillofacial Wo Contrast    Result Date: 12/23/2017  REASON FOR EXAM: fall TECHNICAL FACTORS: Multiple contiguous axial CT images were obtained of the facial bones without administration of intravenous contrast. 2D reformatted images were performed. COMPARISON: Maxillofacial CT February 4, 2017 FINDINGS: There is no evidence of acute fracture. Remote nasal fracture. Minimal right maxillary sinus mucosal disease. The paranasal sinuses are otherwise well aerated. There is no fluid within the paranasal sinus cavities Symmetrical appearing orbits with prior bilateral lens removal. There is rightward deflection of the bony nasal septum with an associated bony spur. Minimal left supraorbital contusion. IMPRESSION: 1. Minimal left supraorbital contusion without underlying acute bony abnormality. 2. Remote nasal fracture as demonstrated on February 4, 2017. Electronically signed by Gregory Zambrano MD on 12/23/2017 12:31 PM     Xr Chest Ap Portable    Result Date: 12/23/2017  REASON FOR EXAM: cough TECHNICAL FACTORS: 1 view(s) COMPARISON: July 1, 2015 FINDINGS: The lungs are hyperinflated with patchy regions of parenchymal scarring involving the lung apices as well as the left lung base and perihilar region. Heterogeneous infiltrates are present in the left  upper lobe. There is superior retraction of the hilar structures secondary to chronic pleural and parenchymal disease in the lung apices. Atheromatous changes of the thoracic aorta. There is a hiatal hernia. Heart size appears within normal limits. There is a dextrocurvature of the thoracic spine. Degenerative changes of the vertebral column are noted.     COPD with scattered regions of parenchymal scarring. Superimposed left upper lobe pneumonia. Electronically signed by Gregory Zambrano MD on 12/23/2017 2:10 PM     Ct Angio Chest Abdomen Pelvis W Contrast    Result Date: 12/23/2017  REASON FOR EXAM: diffuse abd pain, more severe in the BLQ, pain out of proportion TECHNICAL FACTORS: Intravenous contrast images are obtained of the chest, abdomen, and pelvis with image postprocessing, including maximum intensity projection (MIP) reconstructions. Nonintravenous contrast  images were also obtained. Images are stored in the patient's permanent record. Automated exposure control was utilized for radiation dose reduction. DOSE: 100 mL Isovue-370 COMPARISON: CT of the chest July 9, 2015 CHEST FINDINGS: The thoracic aorta is normal in caliber without evidence of aneurysm or dissection. The great vessels are normal in appearance without evidence of focal stenosis. Pulmonary arteries distribute and opacify normally. There is no filling defect observed. There are again noted multiple regions of pleural and parenchymal scarring with associated cystic formation involving both lungs. However, there is been interval development of multiple cavitary lesions particularly on the left. These involve both the upper and lower lobes, the majority of which appear thin-walled. The heart is not enlarged and there is no pericardial fluid. Coronary vessel atherosclerosis. Patulous esophagus with a large hiatal hernia. There are degenerative changes of the vertebral column. ABDOMEN FINDINGS: The abdominal aorta is normal in caliber without  evidence of aneurysm or dissection. The celiac artery, superior mesenteric artery, and inferior mesenteric artery are patent without evidence of significant stenosis. The renal arteries are patent without evidence of significant stenosis. There are multiple bilateral renal parapelvic cysts. No abnormality of the liver, spleen, or pancreas. Stable bilateral adrenal gland nodules. Moderate distention of loops of proximal small bowel without evidence of obstruction. PELVIS FINDINGS: The common iliac, external iliac, and internal iliac arteries are patent without evidence of significant stenosis. The common femoral arteries are patent without evidence of significant stenosis. Long segment colonic wall thickening of the sigmoid colon. Normal appearance of the appendix. Trace dependent fluid in the pelvic cavity. The bones are osteopenic. There are degenerative changes of the hips and lumbar spine. IMPRESSION: 1. Mild diffuse atherosclerosis without high-grade stenosis, aneurysm, or dissection. 2. Sigmoid colitis. Moderate distention of small bowel may indicate an enterocolitis. No evidence of bowel obstruction. 3. Cavitary lesions involving both right and left lungs. Appearance favors an infectious/inflammatory etiology although cavitary neoplasia may not be excluded on the basis of these images. Electronically signed by Gregory Zambrano MD on 12/23/2017 1:00 PM     Assessment  Admit Dx:Hypokalemia [E87.6]  Lactic acidosis [E87.2]  Colitis [K52.9]  Hyponatremia [E87.1]  Hypoalbuminemia [E88.09]  Generalized abdominal pain [R10.84]  Cavitary lesion of lung [J98.4]  Transient hypotension [I95.9]  Abrasion of face, initial encounter [S00.81XA]  Fall, initial encounter [W19.XXXA]  Atherosclerosis [I70.90]  Hypotension [I95.9]  Anemia, unspecified type [D64.9]  Diarrhea, unspecified type [R19.7]  Hypotension [I95.9]    Hospital/Active Problems:  Principal Problem:  Hypotension  Active Problems:  Enterocolitis  Left upper lobe  pneumonia      Plan  Admit to MD: Joseph Alanis DO    Code Status: Full Code     1). Hypotension secondary to enterocolitis and left-upper lobe pneumonia  - Admit to telemetry unit with observation status.   - Continue rehydration with NS with KCl 20 mEq at 75 mL/hr.  - Assess and record orthostatic vital signs.  - Hold all antihypertensive medications at this time.   - Patient placed on fall precautions. Bedrest unless assisted for ambulation.    2). Left-upper lobe pneumonia with concern for healthcare-associated pneumonia  - Concern for HCAP due to frequent daily visits to hospitalized family friend prior to admission  - Start Vancomycin and Zosyn (day 1)  - Sputum culture ordered  - DuoNeb treatments ordered q6 hours scheduled and q2 hours prn SOB/wheezing  - Chest physiotherapy q6 hours    3). Nausea, vomiting, diarrhea secondary to enterocolitis  - No history of recent antibiotic use. However, will check C. Diff PCR  - Clear diet ordered  - Rehydration as above  - Zofran prn nausea/vomiting    4). Hypokalemia  - KCl 20 mEq added to IV fluid as above  - Will recheck renal function with BMP in AM    5). History of hypertension  - Hold antihypertensive medications due to presentation with hypotension    6). Hyperlipidemia  - Continue home Lipitor    7). Peripheral neuropathy  - Continue home gabapentin    8). Coronary artery disease s/p arterial stent placement  - Continue home Plavix    9). Chronic obstructive pulmonary disease  - Hold daily Advair and atrovent as patient placed on scheduled and prn DuoNeb treatments    10). Gastroesophageal reflux disease  - Continue home Prilosec    11). Chronic back pain  - Continue home Ultram    DVT ppx: SCDs    Diagnostic/Procedure Orders:  Orders Placed This Encounter   Procedures    Sputum culture Includes Gram Stain    XR Chest AP Portable    CT Head WO Contrast    CT Maxillofacial WO Contrast    CT Angio Chest Abdomen Pelvis W Contrast    CBC with Differential     Comprehensive metabolic panel    Lipase    CK-MB    CK    Troponin I    Magnesium    Lactic acid, plasma    Protime-INR Pt is NOT on Coumadin    APTT    URINALYSIS, COMPLETE    Rapid influenza A/B antigens    Glomerular Filtration Rate    NOHS Occult Blood Stool, Screening    C. Diff Ag/Toxin w/ Reflex to PCR    CBC with Differential    Basic metabolic panel    Magnesium    Liquid, Clear    Cardiac monitoring    Continuous Pulse Oximetry    Vital signs    Up with assistance    Orthostatic blood pressure    Full code    Inpatient Consult to Pharmacy - Vancomycin Dosing    Chest physiotherapy    ECG 12 lead    Saline lock IV    Patient Status - Observation    Patient Status - Observation    Fall Precaution    Plexipulse pump (Daily)       Meds:     atorvastatin 20 mg HS   clopidogrel 75 mg Daily   gabapentin 600 mg HS   ipratropium-albuterol 3 mL Q6H   metoprolol 12.5 mg HS   mometasone 2 spray Daily   omeprazole 20 mg Daily   piperacillin-tazobactam 3.375 g Q6H [id number]   traMADol 50 mg HS       ipratropium-albuterol 3 mL Q2H PRN   ondansetron 4 mg Q6H PRN

## 2024-05-18 ENCOUNTER — PATIENT MESSAGE (OUTPATIENT)
Dept: FAMILY MEDICINE | Facility: CLINIC | Age: 89
End: 2024-05-18

## 2024-09-11 ENCOUNTER — PATIENT MESSAGE (OUTPATIENT)
Dept: FAMILY MEDICINE | Facility: CLINIC | Age: 89
End: 2024-09-11